# Patient Record
Sex: FEMALE | Race: BLACK OR AFRICAN AMERICAN | NOT HISPANIC OR LATINO | Employment: FULL TIME | URBAN - METROPOLITAN AREA
[De-identification: names, ages, dates, MRNs, and addresses within clinical notes are randomized per-mention and may not be internally consistent; named-entity substitution may affect disease eponyms.]

---

## 2017-01-26 ENCOUNTER — OFFICE VISIT (OUTPATIENT)
Dept: PODIATRY | Facility: CLINIC | Age: 47
End: 2017-01-26
Payer: COMMERCIAL

## 2017-01-26 VITALS — HEIGHT: 63 IN | WEIGHT: 181.69 LBS | BODY MASS INDEX: 32.19 KG/M2

## 2017-01-26 DIAGNOSIS — M72.2 PLANTAR FASCIITIS: Primary | ICD-10-CM

## 2017-01-26 DIAGNOSIS — M79.672 FOOT PAIN, LEFT: ICD-10-CM

## 2017-01-26 DIAGNOSIS — M24.573 EQUINUS CONTRACTURE OF ANKLE: ICD-10-CM

## 2017-01-26 PROCEDURE — 99999 PR PBB SHADOW E&M-EST. PATIENT-LVL III: CPT | Mod: PBBFAC,,, | Performed by: PODIATRIST

## 2017-01-26 PROCEDURE — 99213 OFFICE O/P EST LOW 20 MIN: CPT | Mod: 25,S$GLB,, | Performed by: PODIATRIST

## 2017-01-26 PROCEDURE — 29540 STRAPPING ANKLE &/FOOT: CPT | Mod: LT,S$GLB,, | Performed by: PODIATRIST

## 2017-01-26 PROCEDURE — 1159F MED LIST DOCD IN RCRD: CPT | Mod: S$GLB,,, | Performed by: PODIATRIST

## 2017-01-26 NOTE — PROGRESS NOTES
Subjective:      Patient ID: Joselin Phillips is a 46 y.o. female.    Chief Complaint: Foot Pain (left)    Sharp deep pain bottom left heel. Minimal if any improvement from inserts, stretches, athletic shoes, one injection, and fx boot.  X-rays show inferior spur without acute injury.      Review of Systems   Constitution: Negative for chills, diaphoresis, fever, malaise/fatigue and night sweats.   Cardiovascular: Negative for claudication, cyanosis, leg swelling and syncope.   Skin: Negative for color change, dry skin, nail changes, rash, suspicious lesions and unusual hair distribution.   Musculoskeletal: Negative for falls, joint pain, joint swelling, muscle cramps, muscle weakness and stiffness.   Gastrointestinal: Negative for constipation, diarrhea, nausea and vomiting.   Neurological: Negative for brief paralysis, disturbances in coordination, focal weakness, numbness, paresthesias, sensory change and tremors.           Objective:      Physical Exam   Constitutional: She appears well-developed and well-nourished. She is cooperative. No distress.   Cardiovascular:   Pulses:       Popliteal pulses are 2+ on the right side, and 2+ on the left side.        Dorsalis pedis pulses are 2+ on the right side, and 2+ on the left side.        Posterior tibial pulses are 2+ on the right side, and 2+ on the left side.   Capillary refill 3 seconds all toes/distal feet, all toes/both feet warm to touch.      Negative lymphadenopathy bilateral popliteal fossa and tarsal tunnel.      Negavie lower extremity edema bilateral.     Musculoskeletal:        Right ankle: Normal. She exhibits normal range of motion, no swelling, no ecchymosis, no deformity, no laceration and normal pulse. Achilles tendon normal. Achilles tendon exhibits no pain, no defect and normal Rodríguez's test results.   Sharp deep pain to palpation inferior heel left at medial calcaneal tubercle without ecchymosis, erythema, edema, or cardinal signs  infection, and no signs of trauma.    Ankle dorsiflexion decreased at <10 degrees bilateral with moderate increase with knee flexion bilateral.    Otherwise, Normal angle, base, station of gait. All ten toes without clubbing, cyanosis, or signs of ischemia.  No pain to palpation bilateral lower extremities.  Range of motion, stability, muscle strength, and muscle tone normal bilateral feet and legs.     Lymphadenopathy: No inguinal adenopathy noted on the right or left side.   Negative lymphadenopathy bilateral popliteal fossa and tarsal tunnel.   Neurological: She is alert. She has normal strength. She displays no atrophy and no tremor. No sensory deficit. She exhibits normal muscle tone. She displays no seizure activity. Gait normal.   Reflex Scores:       Patellar reflexes are 2+ on the right side and 2+ on the left side.       Achilles reflexes are 2+ on the right side and 2+ on the left side.  Negative tinel sign to percussion sural, superficial peroneal, deep peroneal, saphenous, and posterior tibial nerves right and left ankles and feet.     Skin: Skin is warm, dry and intact. No abrasion, no bruising, no burn, no ecchymosis, no laceration, no lesion and no rash noted. She is not diaphoretic. No cyanosis or erythema. No pallor. Nails show no clubbing.     Skin is normal age and health appropriate color, turgor, texture, and temperature bilateral lower extremities without ulceration, hyperpigmentation, discoloration, masses nodules or cords palpated.  No ecchymosis, erythema, edema, or cardinal signs of infection bilateral lower extremities.               Assessment:       Encounter Diagnoses   Name Primary?    Plantar fasciitis Yes    Foot pain, left     Equinus contracture of ankle          Plan:       Joselin was seen today for foot pain.    Diagnoses and all orders for this visit:    Plantar fasciitis  -     Ambulatory consult to Physical Therapy  -     ORTHOTIC DEVICE (DME)    Foot pain, left  -      Ambulatory consult to Physical Therapy  -     ORTHOTIC DEVICE (DME)    Equinus contracture of ankle  -     Ambulatory consult to Physical Therapy  -     ORTHOTIC DEVICE (DME)      I counseled the patient on her conditions, their implications and medical management.    I applied a plantar rest strapping to the patient's left foot to offload symptomatic area, support the arch, and relieve pain.    Continue inserts, athletic shoes, fx boot, nsaids.    Rx PT, custom orthotics.          Return in about 6 weeks (around 3/9/2017) for left pf.

## 2017-01-26 NOTE — MR AVS SNAPSHOT
"    [facility]  [address] [address]  Phone: [phone]                  Joselin Phillips   2017 9:45 AM   Office Visit    Description:  Female : 1970   Provider:  Amilcar Guan DPM   Department:  [facility]           Reason for Visit     Foot Pain           Diagnoses this Visit        Comments    Plantar fasciitis    -  Primary     Foot pain, left         Equinus contracture of ankle                To Do List           Future Appointments        Provider Department Dept Phone    3/9/2017 10:00 AM Amilcar Guan DPM [facility] [phone]      Goals (5 Years of Data)     None      Follow-Up and Disposition     Return in about 6 weeks (around 3/9/2017) for left pf.    Follow-up and Disposition History      [facility]     [facility] Care [facility] -  Assistance  Registered nurses in the [facility] provide clinical advisement, health education, appointment booking, and other advisory services.  Call for this free service at [phone].             Medications           Message regarding Medications     Verify the changes and/or additions to your medication regime listed below are the same as discussed with your clinician today.  If any of these changes or additions are incorrect, please notify your healthcare provider.             Verify that the below list of medications is an accurate representation of the medications you are currently taking.  If none reported, the list may be blank. If incorrect, please contact your healthcare provider. Carry this list with you in case of emergency.           Current Medications     meloxicam (MOBIC) 15 MG tablet Take 1 tablet (15 mg total) by mouth once daily.           Clinical Reference Information           Vital Signs - Last Recorded  Most recent update: 2017  9:49 AM by Winifred Guadarrama LPN     Wt BMI          5' 3" (1.6 m) 82.4 kg (181 lb 10.5 oz) 32.18 kg/m2      "   Allergies as of 1/26/2017     No Known Allergies      Immunizations Administered on Date of Encounter - 1/26/2017     None      Orders Placed During Today's Visit      Normal Orders This Visit    Ambulatory consult to Physical Therapy     ORTHOTIC DEVICE (DME)       [facility] Sign-Up     Activating your [facility] account is as easy as 1-2-3!     1) Visit [email or web address], select Sign Up Now, enter this activation code and your date of birth, then select Next.  [email or web address]  Expires: 3/12/2017 10:12 AM      2) Create a username and password to use when you visit [facility] in the future and select a security question in case you lose your password and select Next.    3) Enter your e-mail address and click Sign Up!    Additional Information  If you have questions, please e-mail [email or web address] or call [phone] to talk to our [facility] staff. Remember, [facility] is NOT to be used for urgent needs. For medical emergencies, dial 911.

## 2017-02-06 ENCOUNTER — CLINICAL SUPPORT (OUTPATIENT)
Dept: REHABILITATION | Facility: HOSPITAL | Age: 47
End: 2017-02-06
Attending: PODIATRIST
Payer: COMMERCIAL

## 2017-02-06 DIAGNOSIS — M72.2 PLANTAR FASCIITIS: ICD-10-CM

## 2017-02-06 PROCEDURE — 97161 PT EVAL LOW COMPLEX 20 MIN: CPT | Mod: PN

## 2017-02-06 NOTE — PLAN OF CARE
"TIME RECORD    02/06/2017    Start Time:  1018  Stop Time:  1100    PROCEDURES:    TIMED  Procedure Time Min.    Start:  Stop:     Start:  Stop:     Start:  Stop:     Start:  Stop:          UNTIMED  Procedure Time Min.   Evaluation Start:1018  Stop:1100 48    Start:  Stop:      Total Timed Minutes:  0  Total Timed Units:  0  Total Untimed Units:  1  Charges Billed/# of units:  1    OUTPATIENT PHYSICAL THERAPY   PATIENT EVALUATION  Onset Date: 2011  Problem List Items Addressed This Visit     Plantar fasciitis        Treatment Diagnosis: Gait abnormality    No past medical history on file.    No past surgical history on file.    has a current medication list which includes the following prescription(s): meloxicam.    Precautions: standard  Surgical history: see above  Prior Therapy: yes, lower back  History of Present Illness: Patient is a 45 yo who presents to physical therapy with c/o chronic left foot pain. Minimal relief with boot.   Prior Level of Function: Independent  Social History:    Living environment: apartment    Stairs to enter home: none    Railings:    Employment: / at [employer]   Transportation: drives   Leisure activities/hobbies: walking, listening to music    Current level of function: Independent  Functional Deficits Leading to Referral/Nature of Injury: decreased tolerance to activity, gait abnormality  Patient Therapy Goals: "Decrease pain, get back in my shoes."    Patient Identified Problem List:    1. Pain and decreased tolerance to standing, walking at work  2. Cannot wear heels or flats      Subjective     Joselin Phillips states pain after an hour at work. Wants to have surgery to remove the bone spur    Pain:  Location: left heel and arch  Description: throbbing, sharp  Activities Which Increase Pain: Standing and Walking  Activities Which Decrease Pain: rest  Pain Scale: 3/10 at best 7/10 now  10/10 at worst    Numbness/Paraesthesias: subjectivie " numbness/tingling at pain area when on her feet for long periods of time    Objective     Posture: In standing with decreased weight shift to LLE, wearing boot  Palpation: Tenderness at left plantar foot along plantar fascia and medial calcaneal tubercle  Sensation: intact to LT  DTRs:   Range of Motion/Strength:      Ankle Right  Left  Pain/Dysfunction with Movement    PROM MMT PROM MMT    Dorsiflexion -3* 5/5 -5* 4-/5    Planarflexion 40* 5/5 36* 4-/5    Inversion 35* 5/5 36* 4-/5    Eversion 15* 5/5 14* 4-/5      *pain with all resisted motions of left ankle        Flexibility: Tightness present in bilateral gastroc/soleus complex  Gait: antalgic  Bed Mobility: Supine<>sit Independent  Transfers: Sit<>stand Independent  Functional Outcome Measure: Lower Extremity Functional Scale (LEFS): 15/80=81% impairmed  Treatment: Educated on role/goal PT, POC.  Pt verbalizing understanding and agreement.     Assessment       Initial Assessment (Pertinent finding, problem list and factors affecting outcome): Patient is a 45 yo F presenting to PT with c/o left foot pain since 2011. Notable impairments include decreased ROM, weakness, gait abnormality, and impaired functional mobility. Patient would benefit from skilled PT to address notable impairments and improve functional mobility to PLOF.    Rehab Potiential: good    Short Term Goals (3 Weeks): 1) Pt will initiate HEP 2) Patient will improve DF to neutral left ankle for ambulatory purposes   Long Term Goals (6 Weeks): 1) Pt will be I with HEP 2) Pt will return to community ambulation with strength 4/5 or > 3) Pt will improve LEFS by 10%    Plan     Certification Period: 02/06/17 to 03/24/17  Recommended Treatment Plan: 2 times per week for 6 weeks: Gait Training, Manual Therapy, Moist Heat/ Ice, Patient Education, Therapeutic Activites, Therapeutic Exercise, Ultrasound/Phonophoresis, Whirlpool/Fluidotherapy and Other dry needling PRN  Other Recommendations:     Thank you  for referral.    Therapist: GUILLERMO Maria THE NEED FOR THESE SERVICES FURNISHED UNDER THIS PLAN OF TREATMENT AND WHILE UNDER MY CARE    Physician's comments: ________________________________________________________________________________________________________________________________________________      Physician's Name: ___________________________________

## 2017-02-09 PROBLEM — M72.2 PLANTAR FASCIITIS: Status: ACTIVE | Noted: 2017-02-09

## 2017-02-14 ENCOUNTER — TELEPHONE (OUTPATIENT)
Dept: REHABILITATION | Facility: HOSPITAL | Age: 47
End: 2017-02-14

## 2017-02-16 ENCOUNTER — TELEPHONE (OUTPATIENT)
Dept: REHABILITATION | Facility: HOSPITAL | Age: 47
End: 2017-02-16

## 2017-02-21 ENCOUNTER — CLINICAL SUPPORT (OUTPATIENT)
Dept: REHABILITATION | Facility: HOSPITAL | Age: 47
End: 2017-02-21
Attending: PODIATRIST
Payer: COMMERCIAL

## 2017-02-21 DIAGNOSIS — M72.2 PLANTAR FASCIITIS: ICD-10-CM

## 2017-02-21 PROCEDURE — 97110 THERAPEUTIC EXERCISES: CPT | Mod: PN

## 2017-02-21 PROCEDURE — 97140 MANUAL THERAPY 1/> REGIONS: CPT | Mod: PN

## 2017-02-21 NOTE — PROGRESS NOTES
"Name: Joselin Phillips  Clinic Number: [id number]  Date of Treatment: 02/21/2017   Diagnosis:   Encounter Diagnosis   Name Primary?    Plantar fasciitis        Time in: 1105  Time Out: 1210  Total Treatment Time: 65    Subjective:    Joselin reports plantar L heel pain. Wearing L walking boot, reporting that she wears it "almost all the time" 2* discomfort that ensues shortly after wearing even new athletic shoes with inserts.  Patient reports their pain to be 6/10 on a 0-10 scale with 0 being no pain and 10 being the worst pain imaginable.    Objective    Patient received individual therapy to increase strength, endurance, ROM and flexibility with activities as follows:     Joselin received therapeutic exercises to develop strength, endurance, ROM and flexibility for 50 minutes including:     Nutep L2 10' LE's and UE's  Standing gastroc stretches B 3/30s in // bars  Standing soleus stretches 3/30s B in // bars  Standing plantar fascia stretches L on wall 3/30s  Seated HR/TR L 10/3  Seated arch curls L 10/3  Seated towel scrunches L 10/3  Seated ankle 4 way L 10/3 with YTB    Joselin received the following manual therapy techniques: Myofacial release and Soft tissue Mobilization were applied to the: L plantar fascia and foot for 10 minutes.     Educated pt of DOMS effect. Written and pictorial HEP of ankle 4 way, ankle circles, towel scrunches, and stretches: gastrocs and soleus reviewed and issued to pt. Pt instructed to perform HEP daily and stop if symptoms increase. Instructed pt to bring tennis shoe for wearing during PT sessions.    Pt demo good understanding of the education provided. Joselin demonstrated good return demonstration of activities.     Assessment:     Good tolerance for first session since eval with mm fatigue but no increase in symptoms reported. Tenderness plantar L foot which improves with manual. Limited L gastroc flexibility.     Pt will continue to benefit from skilled PT " intervention. Medical Necessity is demonstrated by:  Requires skilled supervision to complete and progress HEP and Weakness.    Patient is making good progress towards established goals.    Plan:    Continue with established Plan of Care towards PT goals.

## 2017-02-21 NOTE — PATIENT INSTRUCTIONS
Plantar Fasciitis  Plantar fasciitis is a painful swelling of the plantar fascia. The plantar fascia is a thick, fibrous layer of tissue. It covers the bones on the bottom of your foot. And it supports the foot bones in an arched position.  This can happen gradually or suddenly. It usually affects one foot at a time. Heel pain can be sharp, like a knife sticking into the bottom of your foot. You may feel pain after exercising, long-distance jogging, stair climbing, long periods of standing, or after standing up.  Risk factors include: non-active lifestyle, arthritis, diabetes, obesity or recent weight gain, flat foot, high arch. Wearing high heels, loose shoes, or shoes with poor arch support for long periods of time adds to the risk. This problem is commonly found in runners and dancers. It also found in people who stand on hard surfaces for long periods of time.  Foot pain from this condition is usually worse in the morning. But it improves with walking. By the end of the day there may be a dull aching. Treatment requires short-term rest and controlling swelling. It may take up to 9 months before all symptoms go away. Rarely, a steroid injection into the foot, or surgery, may be needed.  Home care  · If you are overweight, lose weight to help healing.  · Choose supportive shoes with good arch support and shock absorbency. Replace athletic shoes when they become worn out. Dont walk or run barefoot.  · Premade or custom-fitted shoe inserts may be helpful. Inserts made of silicone seem to be the most effective. Custom-made inserts can be provided by a podiatrist or foot specialist, physical therapist, or orthopedist.  · Premade or custom-made night splints keep the heel stretched out while you sleep. They may prevent morning pain.  · Avoid activities that stress the feet: jogging, prolonged standing or walking, contact sports, etc.  · First thing in the morning and before sports, stretch the bottom of your feet.  Gently flex your ankle so the toes move toward your knee.  · Icing may help control heel pain. Apply an ice pack to the heel for 10-20 minutes as a preventive. Or ice your heel after a severe flare-up of symptoms. You may repeat this every 1-2 hours as needed.  · You may use over-the-counter pain medicine to control pain, unless another medicine was prescribed. Anti-inflammatory pain medicines, such as ibuprofen or naproxen, may work better than acetaminophen. If you have chronic liver or kidney disease or ever had a stomach ulcer or GI bleeding, talk with your healthcare provider before using these medicines.  Follow-up care  Follow up with your healthcare provider, physical therapist, or podiatrist or foot specialist as advised.  Call for an appointment if pain worsens or there is no relief after a few weeks of home treatment. Shoe inserts, a night splint, or a special boot may be required.  If X-rays were taken, you will be told of any new findings that may affect your care.  When to seek medical advice  Call your healthcare provider right away if any of these occur:  · Foot swelling  · Redness with increasing pain  Date Last Reviewed: 11/21/2015  © 1574-3966 [employer]. [address] [place] [address]. All rights reserved. This information is not intended as a substitute for professional medical care. Always follow your healthcare professional's instructions.        Understanding Plantar Fasciitis    Plantar fasciitis is a condition that causes foot and heel pain. The plantar fascia is a tough band of tissue that runs across the bottom of the foot from the heel to the toes. This tissue pulls on the heel bone. It supports the arch of the foot as it pushes off the ground. If the tissue becomes irritated or red and swollen (inflamed), it is called plantar fasciitis.  How to say it  PLAN-tuhr fa-see-IY-[id number]   What causes plantar fasciitis?  Plantar fasciitis most often occurs from overusing  the plantar fascia. The tissue may become damaged from activities that put repeated stress on the heel and foot. Or it may wear down over time with age and ankle stiffness. You are more likely to have plantar fasciitis if you:  · Do activities that require a lot of running, jumping, or dancing  · Have a job that requires being on your feet for long periods  · Are overweight or obese  · Have certain foot problems, such as a tight Achilles tendon, flat feet, or high arches  · Often wear poorly fitting shoes  Symptoms of plantar fasciitis  The condition most often causes pain in the heel and the bottom of the foot. The pain may occur when you take your first steps in the morning. It may get better as you walk throughout the day. But as you continue to put weight on the foot, the pain often returns. Pain may also occur after standing or sitting for long periods.  Treating plantar fasciitis  Treatments for plantar fasciitis include:  · Resting the foot. This involves limiting movements that make your foot hurt. You may also need to avoid certain sports and types of work for a time.  · Using cold packs. Put an ice pack on the heel and foot to help reduce pain and swelling.  · Taking pain medicines. Prescription and over-the-counter pain medicines can help relieve pain and swelling.  · Using heel cups or foot inserts (orthotics). These are placed in the shoes to help support the heel or arch and cushion the heel. You may also be told to buy proper-fitting shoes with good arch support and cushioned soles.  · Taping the foot. This supports the arch and limits the movement of the plantar fascia to help relieve symptoms.  · Wearing a night splint. This stretches the plantar fascia and leg muscles while you sleep. This may help relieve pain.  · Doing exercises and physical therapy. These stretch and strengthen the plantar fascia and the muscles in the leg that support the heel and foot.  · Getting shots of medicine into the  foot. These may help relieve symptoms for a time.  · Having surgery. This may be needed if other treatments fail to relieve symptoms. During surgery, the surgeon may partially cut the plantar fascia to release tension.  Possible complications of plantar fasciitis  Without proper care and treatment, healing may take longer than normal. Also, symptoms may continue or get worse. Over time, the plantar fascia may be damaged. This can make it hard to walk or even stand without pain.  When to call your healthcare provider  Call your healthcare provider right away if you have any of these:  · Fever of 100.4°F (38°C) or higher, or as directed  · Symptoms that dont get better with treatment, or get worse  · New symptoms, such as numbness, tingling, or weakness in the foot   Date Last Reviewed: 3/10/2016  © 7293-4930 [employer]. [address] [place] [address]. All rights reserved. This information is not intended as a substitute for professional medical care. Always follow your healthcare professional's instructions.      Ankle Circles        Slowly rotate right foot and ankle clockwise then counterclockwise. Gradually increase range of motion. Avoid pain.  [place] ____ times each direction per set. Do ____ sets per session. Do ____ sessions per day.     [email or web address]     Copyright © [employer]. All rights reserved.   Dorsiflexion: Resisted        Facing anchor, tubing around left foot, pull toward face.   Repeat ____ times per set. Do ____ sets per session. Do ____ sessions per day.     [email or web address]     Copyright © [employer]. All rights reserved.   Eversion: Resisted        With right foot in tubing loop, hold tubing around other foot to resist and turn foot out.  Repeat ____ times per set. Do ____ sets per session. Do ____ sessions per day.     [email or web address]     Copyright © [employer]. All rights reserved.   Inversion: Resisted        Cross legs with right leg underneath, foot in tubing  loop. Hold tubing around other foot to resist and turn foot in.  Repeat ____ times per set. Do ____ sets per session. Do ____ sessions per day.     [email or web address]     Copyright © [employer]. All rights reserved.   Plantar Flexion: Resisted        [place] behind, tubing around left foot, press down.  Repeat ____ times per set. Do ____ sets per session. Do ____ sessions per day.     [email or web address]     Copyright © [employer]. All rights reserved.   Gastroc Stretch        Stand with right foot back, leg straight, forward leg bent. Keeping heel on floor, turned slightly out, lean into wall until stretch is felt in calf. Hold ____ seconds.  Repeat ____ times per set. Do ____ sets per session. Do ____ sessions per day.     [email or web address]     Copyright © [employer]. All rights reserved.   Soleus Stretch        Stand with right foot back, both knees bent. Keeping heel on floor, turned slightly out, lean into wall until stretch is felt in lower calf. Hold ____ seconds.  Repeat ____ times per set. Do ____ sets per session. Do ____ sessions per day.     [email or web address]     Copyright © [employer]. All rights reserved.

## 2017-02-21 NOTE — MR AVS SNAPSHOT
[facility] [address]  [place] [address]  Phone: [phone]  Fax: [phone]                  Joselin Phillips   2017 11:00 AM   Clinical Support    Description:  Female : 1970   Provider:  Jeimy Sawant PTA   Department:  [facility]                To Do List           Future Appointments        Provider Department Dept Phone    2017 11:00 AM Mary Thomas, PT [facility] [phone]    2017 9:00 AM Jeimy Sawant PTA [facility] [phone]    3/2/2017 11:00 AM Jeimy Sawant PTA [facility] [phone]    3/7/2017 11:00 AM Jeimy Sawant PTA [facility] [phone]    3/9/2017 10:00 AM Amilcar Guan DPM [place] - Podiatry [phone]      Goals (5 Years of Data)     None      [facility]     [facility] Line -  Assistance  Registered nurses in the [facility] provide clinical advisement, health education, appointment booking, and other advisory services.  Call for this free service at [phone].             Medications           Message regarding Medications     Verify the changes and/or additions to your medication regime listed below are the same as discussed with your clinician today.  If any of these changes or additions are incorrect, please notify your healthcare provider.             Verify that the below list of medications is an accurate representation of the medications you are currently taking.  If none reported, the list may be blank. If incorrect, please contact your healthcare provider. Carry this list with you in case of emergency.           Current Medications     meloxicam (MOBIC) 15 MG tablet Take 1 tablet (15 mg total) by mouth once daily.           Clinical Reference Information           Allergies as of 2017     No Known Allergies      Immunizations Administered on  Date of Encounter - 2/21/2017     None      [facility] Sign-Up     Activating your [facility] account is as easy as 1-2-3!     1) Visit [email or web address], select Sign Up Now, enter this activation code and your date of birth, then select Next.  [email or web address]  Expires: 3/12/2017 10:12 AM      2) Create a username and password to use when you visit [facility] in the future and select a security question in case you lose your password and select Next.    3) Enter your e-mail address and click Sign Up!    Additional Information  If you have questions, please e-mail [email or web address] or call [phone] to talk to our [facility] staff. Remember, [facility] is NOT to be used for urgent needs. For medical emergencies, dial 911.         Instructions      Plantar Fasciitis  Plantar fasciitis is a painful swelling of the plantar fascia. The plantar fascia is a thick, fibrous layer of tissue. It covers the bones on the bottom of your foot. And it supports the foot bones in an arched position.  This can happen gradually or suddenly. It usually affects one foot at a time. Heel pain can be sharp, like a knife sticking into the bottom of your foot. You may feel pain after exercising, long-distance jogging, stair climbing, long periods of standing, or after standing up.  Risk factors include: non-active lifestyle, arthritis, diabetes, obesity or recent weight gain, flat foot, high arch. Wearing high heels, loose shoes, or shoes with poor arch support for long periods of time adds to the risk. This problem is commonly found in runners and dancers. It also found in people who stand on hard surfaces for long periods of time.  Foot pain from this condition is usually worse in the morning. But it improves with walking. By the end of the day there may be a dull aching. Treatment requires short-term rest and controlling swelling. It may take up to 9 months before all symptoms go away. Rarely, a steroid injection into the foot, or  surgery, may be needed.  Home care  · If you are overweight, lose weight to help healing.  · Choose supportive shoes with good arch support and shock absorbency. Replace athletic shoes when they become worn out. Dont walk or run barefoot.  · Premade or custom-fitted shoe inserts may be helpful. Inserts made of silicone seem to be the most effective. Custom-made inserts can be provided by a podiatrist or foot specialist, physical therapist, or orthopedist.  · Premade or custom-made night splints keep the heel stretched out while you sleep. They may prevent morning pain.  · Avoid activities that stress the feet: jogging, prolonged standing or walking, contact sports, etc.  · First thing in the morning and before sports, stretch the bottom of your feet. Gently flex your ankle so the toes move toward your knee.  · Icing may help control heel pain. Apply an ice pack to the heel for 10-20 minutes as a preventive. Or ice your heel after a severe flare-up of symptoms. You may repeat this every 1-2 hours as needed.  · You may use over-the-counter pain medicine to control pain, unless another medicine was prescribed. Anti-inflammatory pain medicines, such as ibuprofen or naproxen, may work better than acetaminophen. If you have chronic liver or kidney disease or ever had a stomach ulcer or GI bleeding, talk with your healthcare provider before using these medicines.  Follow-up care  Follow up with your healthcare provider, physical therapist, or podiatrist or foot specialist as advised.  Call for an appointment if pain worsens or there is no relief after a few weeks of home treatment. Shoe inserts, a night splint, or a special boot may be required.  If X-rays were taken, you will be told of any new findings that may affect your care.  When to seek medical advice  Call your healthcare provider right away if any of these occur:  · Foot swelling  · Redness with increasing pain  Date Last Reviewed: 11/21/2015  © 8965-0234 [employer]  [employer]. [address] [place] [address], [place] [address]. All rights reserved. This information is not intended as a substitute for professional medical care. Always follow your healthcare professional's instructions.        Understanding Plantar Fasciitis    Plantar fasciitis is a condition that causes foot and heel pain. The plantar fascia is a tough band of tissue that runs across the bottom of the foot from the heel to the toes. This tissue pulls on the heel bone. It supports the arch of the foot as it pushes off the ground. If the tissue becomes irritated or red and swollen (inflamed), it is called plantar fasciitis.  How to say it  PLAN-tuhr fa-see-IY-tis   What causes plantar fasciitis?  Plantar fasciitis most often occurs from overusing the plantar fascia. The tissue may become damaged from activities that put repeated stress on the heel and foot. Or it may wear down over time with age and ankle stiffness. You are more likely to have plantar fasciitis if you:  · Do activities that require a lot of running, jumping, or dancing  · Have a job that requires being on your feet for long periods  · Are overweight or obese  · Have certain foot problems, such as a tight Achilles tendon, flat feet, or high arches  · Often wear poorly fitting shoes  Symptoms of plantar fasciitis  The condition most often causes pain in the heel and the bottom of the foot. The pain may occur when you take your first steps in the morning. It may get better as you walk throughout the day. But as you continue to put weight on the foot, the pain often returns. Pain may also occur after standing or sitting for long periods.  Treating plantar fasciitis  Treatments for plantar fasciitis include:  · Resting the foot. This involves limiting movements that make your foot hurt. You may also need to avoid certain sports and types of work for a time.  · Using cold packs. Put an ice pack on the heel and foot to help reduce pain and  swelling.  · Taking pain medicines. Prescription and over-the-counter pain medicines can help relieve pain and swelling.  · Using heel cups or foot inserts (orthotics). These are placed in the shoes to help support the heel or arch and cushion the heel. You may also be told to buy proper-fitting shoes with good arch support and cushioned soles.  · Taping the foot. This supports the arch and limits the movement of the plantar fascia to help relieve symptoms.  · Wearing a night splint. This stretches the plantar fascia and leg muscles while you sleep. This may help relieve pain.  · Doing exercises and physical therapy. These stretch and strengthen the plantar fascia and the muscles in the leg that support the heel and foot.  · Getting shots of medicine into the foot. These may help relieve symptoms for a time.  · Having surgery. This may be needed if other treatments fail to relieve symptoms. During surgery, the surgeon may partially cut the plantar fascia to release tension.  Possible complications of plantar fasciitis  Without proper care and treatment, healing may take longer than normal. Also, symptoms may continue or get worse. Over time, the plantar fascia may be damaged. This can make it hard to walk or even stand without pain.  When to call your healthcare provider  Call your healthcare provider right away if you have any of these:  · Fever of 100.4°F (38°C) or higher, or as directed  · Symptoms that dont get better with treatment, or get worse  · New symptoms, such as numbness, tingling, or weakness in the foot   Date Last Reviewed: 3/10/2016  © 6198-7138 [employer]. [address] [place] [address], [place] [address]. All rights reserved. This information is not intended as a substitute for professional medical care. Always follow your healthcare professional's instructions.      Ankle Circles        Slowly rotate right foot and ankle clockwise then counterclockwise. Gradually increase range of motion.  Avoid pain.  [place] ____ times each direction per set. Do ____ sets per session. Do ____ sessions per day.     [email or web address]://Lumus.[email or web address]/30     Copyright © [employer]. All rights reserved.   Dorsiflexion: Resisted        Facing anchor, tubing around left foot, pull toward face.   Repeat ____ times per set. Do ____ sets per session. Do ____ sessions per day.     [email or web address]://Lumus.[email or web address]/8     Copyright © [employer]. All rights reserved.   Eversion: Resisted        With right foot in tubing loop, hold tubing around other foot to resist and turn foot out.  Repeat ____ times per set. Do ____ sets per session. Do ____ sessions per day.     https://W[employer]/14     Copyright © [employer]. All rights reserved.   Inversion: Resisted        Cross legs with right leg underneath, foot in tubing loop. Hold tubing around other foot to resist and turn foot in.  Repeat ____ times per set. Do ____ sets per session. Do ____ sessions per day.     https://[employer]/12     Copyright © [employer]. All rights reserved.   Plantar Flexion: Resisted        [place]well behind, tubing around left foot, press down.  Repeat ____ times per set. Do ____ sets per session. Do ____ sessions per day.     https://[employer]/10     Copyright © [employer]. All rights reserved.   Gastroc Stretch        Stand with right foot back, leg straight, forward leg bent. Keeping heel on floor, turned slightly out, lean into wall until stretch is felt in calf. Hold ____ seconds.  Repeat ____ times per set. Do ____ sets per session. Do ____ sessions per day.     https://Lumus.[employer]/26     Copyright © [employer]. All rights reserved.   Soleus Stretch        Stand with right foot back, both knees bent. Keeping heel on floor, turned slightly out, lean into wall until stretch is felt in lower calf. Hold ____ seconds.  Repeat ____ times per set. Do ____ sets per session. Do ____ sessions per day.     https://[employer]/24     Copyright © [employer]. All rights reserved.          Language Assistance Services     ATTENTION:  Language assistance services are available, free of charge. Please call [phone].      ATENCIÓN: Si habla margaretañol, tiene a sanchez disposición servicios gratuitos de asistencia lingüística. Llame al [phone].     CHÚ Ý: N?u b?n nói Ti?ng Vi?t, có các d?ch v? h? tr? ngôn ng? mi?n phí dành cho b?n. G?i s? [phone].         [facility] complies with applicable Federal civil rights laws and does not discriminate on the basis of race, color, national origin, age, disability, or sex.

## 2017-02-24 ENCOUNTER — CLINICAL SUPPORT (OUTPATIENT)
Dept: REHABILITATION | Facility: HOSPITAL | Age: 47
End: 2017-02-24
Attending: PODIATRIST
Payer: COMMERCIAL

## 2017-02-24 DIAGNOSIS — M72.2 PLANTAR FASCIITIS: ICD-10-CM

## 2017-02-24 PROCEDURE — 97110 THERAPEUTIC EXERCISES: CPT | Mod: PN

## 2017-02-24 PROCEDURE — 97140 MANUAL THERAPY 1/> REGIONS: CPT | Mod: PN

## 2017-02-24 NOTE — PROGRESS NOTES
"Name: Joselin Phillips  [facility] Number: [id number]  Date of Treatment: 02/24/2017   Diagnosis:   Encounter Diagnosis   Name Primary?    Plantar fasciitis        Time in: 0904  Time Out: 1000  Total Treatment Time: 56  Group Time: 0      Subjective:    Joselin reports improvement of symptoms.  Patient reports their pain to be 4-5/10 on a 0-10 scale with 0 being no pain and 10 being the worst pain imaginable.    Objective    Joselin received therapeutic exercises to develop strength, endurance and flexibility for 46 minutes including:     Nutep Lv2 10' with UE's  Hamstring stretch 3x30"  Gastroc stretch 3x30" 1/2 roll  Soleus stretch 3x30" 1/2 roll  Plantar fascia stretch seated with towel 3x30"  HR/TR Airex 3x10  Ankle 4-way Red Tband x30 L      Joselin received the following manual therapy techniques: Myofacial release and Soft tissue Mobilization were applied to the: L plantar fascia with toe flexion, and lower calf 10'      Pt demo good understanding of the education provided. Joselin demonstrated good return demonstration of activities.     Assessment:     Pt will continue to benefit from skilled PT intervention. Medical Necessity is demonstrated by:  Pain limits function of effected part for some activities, Unable to participate fully in daily activities, Requires skilled supervision to complete and progress HEP and Weakness.    Patient is making fair progress towards established goals.    Plan:    Continue with established Plan of Care towards PT goals.   "

## 2017-02-27 ENCOUNTER — CLINICAL SUPPORT (OUTPATIENT)
Dept: REHABILITATION | Facility: HOSPITAL | Age: 47
End: 2017-02-27
Attending: PODIATRIST
Payer: COMMERCIAL

## 2017-02-27 DIAGNOSIS — M72.2 PLANTAR FASCIITIS: ICD-10-CM

## 2017-02-27 PROCEDURE — 97110 THERAPEUTIC EXERCISES: CPT | Mod: PN

## 2017-02-27 NOTE — PROGRESS NOTES
"Name: Joselin Phillips  Date:   02/27/2017  Primary Diagnosis: .  Problem List Items Addressed This Visit     Plantar fasciitis          Time in: 0906  Time Out: 0955  Total Treatment Time: 49  Group Time: 0      Subjective:    Patient reports increased pain from working last night. She took a 5 min break to sit down, then had a lot of pain with standing back up and walking afterward.  Patient reports their pain to be 8/10 in the left foot on a 0-10 scale with 0 being no pain and 10 being the worst pain imaginable.    Objective    Patient received individual therapy to address strength, ROM and flexibility with 0 other patients with activities as follows:     Patient received therapeutic exercises to develop strength, endurance, ROM and flexibility for 49 minutes including:     Seated HR/TR 3x10  Ankle 4-way c/ RTB 3x10  Seated hamstring stretch 3/30" B  Standing gastroc stretch 3/30" B  Standing soleus stretch 3/30" B  Seated PF c/ towel 3/30"  MT: STM/MF to plantar foot x 7 min    Assessment:     Patient tolerating treatment fairly. Declining cold pack. Strongly recommended pt use a cold pack at home before her work shift today. Patient verbalizing understanding/agreement and all questions answered.    Pt will continue to benefit from skilled PT intervention. Medical Necessity is demonstrated by:  Pain limits function of effected part for some activities, Unable to participate fully in daily activities and Weakness.    Patient is making fair progress towards established goals.    Plan:  Continue with established Plan of Care towards PT goals.     "

## 2017-02-28 ENCOUNTER — HOSPITAL ENCOUNTER (EMERGENCY)
Facility: HOSPITAL | Age: 47
Discharge: HOME | End: 2017-02-28
Attending: EMERGENCY MEDICINE
Payer: COMMERCIAL

## 2017-02-28 VITALS
TEMPERATURE: 99 F | DIASTOLIC BLOOD PRESSURE: 68 MMHG | WEIGHT: 185 LBS | RESPIRATION RATE: 12 BRPM | HEART RATE: 97 BPM | HEIGHT: 63 IN | BODY MASS INDEX: 32.78 KG/M2 | SYSTOLIC BLOOD PRESSURE: 110 MMHG | OXYGEN SATURATION: 99 %

## 2017-02-28 DIAGNOSIS — M77.32 CALCANEAL SPUR, LEFT: Primary | ICD-10-CM

## 2017-02-28 PROCEDURE — 99283 EMERGENCY DEPT VISIT LOW MDM: CPT

## 2017-02-28 RX ORDER — CYCLOBENZAPRINE HCL 10 MG
10 TABLET ORAL 3 TIMES DAILY PRN
Qty: 15 TABLET | Refills: 0 | Status: SHIPPED | OUTPATIENT
Start: 2017-02-28 | End: 2017-03-05

## 2017-02-28 NOTE — DISCHARGE INSTRUCTIONS
Heel Spurs    The plantar fascia is a thick, fibrous layer of tissue that covers the bones on the bottom of your foot. It holds the foot bones in an arched position. Plantar fasciitis is a painful swelling of the plantar fascia.  A heel spur is an overgrowth of bone where the plantar fascia attaches to the heel bone. The heel spur itself usually doesnt cause pain. However, the heel spur might be a sign of plantar fasciitis which may cause your foot pain. There is no specific treatment for heel spurs.   Plantar fasciitis can develop slowly or suddenly. It usually affects one foot at a time. Heel pain can feel sharp, like a knife sticking into the bottom of your foot. You may feel pain after exercising, long-distance jogging, stair climbing, long periods of standing, or after standing up.  Risk factors for plantar fasciitis include: arthritis, diabetes, obesity or recent weight gain, flat foot, and having high arches. Wearing high heels, loose shoes, or shoes with poor arch support adds to the risk.    Foot pain is usually worse in the morning. But it improves with walking. By the end of the day there may be a dull aching. Treatment includes short-term rest and controlling inflammation. It may take up to 9 months before all symptoms go away. In rare cases, a steroid injection in the foot, or surgery, may be needed.  Home care  · If you are overweight, lose weight to help healing.  · Choose supportive shoes with good arch support and shock absorbency. Replace athletic shoes when they become worn out. Dont walk or run barefoot.  · Premade or custom-fitted shoe inserts may be helpful. Inserts made of silicone seem to be the most effective. Custom-made inserts can be provided by a podiatrist or foot specialist, physical therapist, or orthopedist.  · Premade or custom-made night splints keep the heel stretched out while you sleep. They may prevent morning pain.  · Avoid activities that stress the feet: jogging,  prolonged standing or walking, contact sports, etc.  · First thing in the morning and before sports, stretch the bottom of your foot. Gently flex your ankle so the toes move toward your knee.  · Icing may help control heel pain. Apply an ice pack to the heel for 10-20 minutes as a preventive. Or ice your heel after a severe flare-up of symptoms. You may repeat this every 1-2 hours as needed.  · You may use over-the-counter pain medicine to control pain, unless another medicine was prescribed. Anti-inflammatory pain medicines, such as ibuprofen or naproxen, may work better than acetaminophen. If you have chronic liver or kidney disease or ever had a stomach ulcer or GI bleeding, talk with your healthcare provider before using these medicines.  · Shoe inserts, a night splint, or a special boot may be needed. Use these as directed by your healthcare provider.  Follow-up care   Follow up with your healthcare provider, physical therapist, or podiatrist or foot specialist as advised.  When to seek medical advice  Call your healthcare provider right away if any of these occur:  · The pain worsens.  · There is no relief after a few weeks of home treatment.   Date Last Reviewed: 11/23/2015  © 3686-8991 [employer]. [address] [place] [address], [place] [address]. All rights reserved. This information is not intended as a substitute for professional medical care. Always follow your healthcare professional's instructions.

## 2017-02-28 NOTE — ED PROVIDER NOTES
Encounter Date: 2/28/2017       History     Chief Complaint   Patient presents with    Foot Pain     Pain L heel spur since last pm.     Review of patient's allergies indicates:  No Known Allergies  HPI Comments: This patient is a 46-year-old female with a chronic history of left foot pain.  She reports being diagnosed with a left heel spur within the past year and also possible assoc plantar fasciitis.  She said she has followed up with a local podiatrist, Dr. Guan, and she is currently going to rehabilitation.  She reports she is continuing to wear her hard boot.  She reports she has received injections in the past which only transiently improved her symptoms.  She reports new shoes and insoles are not significantly affecting this either.  She does endorse continuing to stand for long periods of time at her place of work which she understands is exacerbating her pain problem. When questioned about surgery, she reports this is a last resort but she is starting to think that it may be a better option for her.  At this time she denies associated new injury, overlying skin color changes, change in strength or sensation in the foot.  She reports it is pain that is associated with a pulling in the back of the ankle.  She reports pain is refractory to Motrin but she did not take this morning.  He denies she is diabetic.    The history is provided by the patient.     History reviewed. No pertinent past medical history.  History reviewed. No pertinent surgical history.  History reviewed. No pertinent family history.  Social History   Substance Use Topics    Smoking status: Never Smoker    Smokeless tobacco: None    Alcohol use No     Review of Systems   Constitutional: Negative for fever.   Musculoskeletal: Positive for gait problem. Negative for joint swelling.   Neurological: Negative for weakness.       Physical Exam   Initial Vitals   BP Pulse Resp Temp SpO2   02/28/17 0722 02/28/17 0722 02/28/17 0722 02/28/17  0722 02/28/17 0722   110/68 97 12 98.6 °F (37 °C) 99 %     Physical Exam    Constitutional: She appears well-developed and well-nourished. No distress.   HENT:   Head: Normocephalic and atraumatic.   Eyes: EOM are normal.   Neck: Normal range of motion.   Pulmonary/Chest: No respiratory distress.   Musculoskeletal: Normal range of motion. She exhibits tenderness. She exhibits no edema.   Tenderness with palpation of the left heel, no overlying skin changes, soft compartment, no ankle pain, no erythema or rash, intact sensation   Neurological: She is alert.   Skin: Skin is warm and dry. No rash noted. No erythema.   Psychiatric: She has a normal mood and affect. Thought content normal.         ED Course   Procedures  Labs Reviewed - No data to display          Medical Decision Making:   Initial Assessment:   Patient interviewed and examined and found to be in no acute distress.  She reports no new injury or anything else that would warrant new imaging investigation.  She is having an exacerbation of familiar chronic pain.  She'll be added a muscle relaxant and asked to continue with her restriction instructions.  She is counseled and understands that continuing to stand for long periods of time exacerbates her complaint.  She is asked to follow-up with her podiatrist as soon as possible and question whether surgery is a better option for her.  She is additionally asked to return to the emergency department for any new, concerning, worsening symptoms.  ED Management:  Patient agreeable with this plan for follow-up she was discharged in stable condition.  Otherwise asked to return for any new, concerning for worsening symptoms.                   ED Course     Clinical Impression:   The encounter diagnosis was Calcaneal spur, left.    Disposition:   Disposition: Discharged  Condition: Stable       Michael Canela MD  02/28/17 0750

## 2017-02-28 NOTE — ED AVS SNAPSHOT
[facility] [address]  [place] [address]               Joselin Phillips   2017  7:25 AM   ED    Description:  Female : 1970   Department:  [facility]           Your Care was Coordinated By:     Provider Role From To    Michael Canela MD Attending Provider 17 0730 --      Reason for Visit     Foot Pain           Diagnoses this Visit        Comments    Calcaneal spur, left    -  Primary       ED Disposition     ED Disposition Condition Comment    Discharge             To Do List           Follow-up Information     Follow up with Amilcar Guan DPM. Schedule an appointment as soon as possible for a visit in 1 week(s).    Specialties:  Podiatry, Wound Care    Contact information:    [address] [facility] [address]  [phone]         These Medications        Disp Refills Start End    cyclobenzaprine (FLEXERIL) 10 MG tablet 15 tablet 0 2017 3/5/2017    Take 1 tablet (10 mg total) by mouth 3 (three) times daily as needed for Muscle spasms. - Oral    Pharmacy: [facility] [address] [place] [address] [address] [facility] Ph #: [phone]         [facility]     [facility] Care [facility] -  Assistance  Registered nurses in the [facility] provide clinical advisement, health education, appointment booking, and other advisory services.  Call for this free service at [phone].             Medications           Message regarding Medications     Verify the changes and/or additions to your medication regime listed below are the same as discussed with your clinician today.  If any of these changes or additions are incorrect, please notify your healthcare provider.        START taking these NEW medications        Refills    cyclobenzaprine (FLEXERIL) 10 MG tablet 0    Sig: Take 1 tablet (10 mg total) by mouth 3 (three) times daily as needed for Muscle spasms.     Class: Print    Route: Oral           Verify that the below list of medications is an accurate representation of the medications you are currently taking.  If none reported, the list may be blank. If incorrect, please contact your healthcare provider. Carry this list with you in case of emergency.           Current Medications     cyclobenzaprine (FLEXERIL) 10 MG tablet Take 1 tablet (10 mg total) by mouth 3 (three) times daily as needed for Muscle spasms.    meloxicam (MOBIC) 15 MG tablet Take 1 tablet (15 mg total) by mouth once daily.           Clinical Reference Information           Your Vitals Were     BP                   110/68 (BP Location: Right arm, Patient Position: Sitting)           Allergies as of 2/28/2017     No Known Allergies      Immunizations Administered on Date of Encounter - 2/28/2017     None      ED Micro, Lab, POCT     None      ED Imaging Orders     None        Discharge Instructions           Heel Spurs    The plantar fascia is a thick, fibrous layer of tissue that covers the bones on the bottom of your foot. It holds the foot bones in an arched position. Plantar fasciitis is a painful swelling of the plantar fascia.  A heel spur is an overgrowth of bone where the plantar fascia attaches to the heel bone. The heel spur itself usually doesnt cause pain. However, the heel spur might be a sign of plantar fasciitis which may cause your foot pain. There is no specific treatment for heel spurs.   Plantar fasciitis can develop slowly or suddenly. It usually affects one foot at a time. Heel pain can feel sharp, like a knife sticking into the bottom of your foot. You may feel pain after exercising, long-distance jogging, stair climbing, long periods of standing, or after standing up.  Risk factors for plantar fasciitis include: arthritis, diabetes, obesity or recent weight gain, flat foot, and having high arches. Wearing high heels, loose shoes, or shoes with poor arch support adds to the  risk.    Foot pain is usually worse in the morning. But it improves with walking. By the end of the day there may be a dull aching. Treatment includes short-term rest and controlling inflammation. It may take up to 9 months before all symptoms go away. In rare cases, a steroid injection in the foot, or surgery, may be needed.  Home care  · If you are overweight, lose weight to help healing.  · Choose supportive shoes with good arch support and shock absorbency. Replace athletic shoes when they become worn out. Dont walk or run barefoot.  · Premade or custom-fitted shoe inserts may be helpful. Inserts made of silicone seem to be the most effective. Custom-made inserts can be provided by a podiatrist or foot specialist, physical therapist, or orthopedist.  · Premade or custom-made night splints keep the heel stretched out while you sleep. They may prevent morning pain.  · Avoid activities that stress the feet: jogging, prolonged standing or walking, contact sports, etc.  · First thing in the morning and before sports, stretch the bottom of your foot. Gently flex your ankle so the toes move toward your knee.  · Icing may help control heel pain. Apply an ice pack to the heel for 10-20 minutes as a preventive. Or ice your heel after a severe flare-up of symptoms. You may repeat this every 1-2 hours as needed.  · You may use over-the-counter pain medicine to control pain, unless another medicine was prescribed. Anti-inflammatory pain medicines, such as ibuprofen or naproxen, may work better than acetaminophen. If you have chronic liver or kidney disease or ever had a stomach ulcer or GI bleeding, talk with your healthcare provider before using these medicines.  · Shoe inserts, a night splint, or a special boot may be needed. Use these as directed by your healthcare provider.  Follow-up care   Follow up with your healthcare provider, physical therapist, or podiatrist or foot specialist as advised.  When to seek medical  advice  Call your healthcare provider right away if any of these occur:  · The pain worsens.  · There is no relief after a few weeks of home treatment.   Date Last Reviewed: 11/23/2015  © 5366-1296 [employer]. [address] [place] [address], [place] [address]. All rights reserved. This information is not intended as a substitute for professional medical care. Always follow your healthcare professional's instructions.          Your Scheduled Appointments     Mar 02, 2017 11:00 AM CST   Established Physical Therapy with Jeimy Sawant PTA   [facility] ([facility])    [address] [place] [address]   [phone]            Mar 07, 2017 11:00 AM CST   Established Physical Therapy with Mary Thomas PT   [facility] ([facility])    [address] [place] [address]   [phone]            Mar 09, 2017 10:00 AM CST   Established Patient Visit with Amilcar Guan DPM   [facility]    [address] [facility] [address]   [phone]            Mar 09, 2017 12:00 PM CST   Established Physical Therapy with Mary Thomas PT   [facility] [address]   [phone]            Mar 14, 2017 11:00 AM CDT   Established Physical Therapy with Jeimy Sawant PTA   [facility] ([facility])    [address] [place] [address]   [phone]              [facility] Sign-Up     Activating your [facility] account is as easy as 1-2-3!     1) Visit [email or web address], select Sign Up Now, enter this activation code and your date of birth, then select Next.  [email or web address]  Expires: 3/12/2017 10:12 AM      2) Create a username and password to use when you visit [facility] in the future and select a security question in case you lose your password and select Next.    3) Enter your e-mail  address and click Sign Up!    Additional Information  If you have questions, please e-mail [email or web address] or call [phone] to talk to our [facility] staff. Remember, [facility] is NOT to be used for urgent needs. For medical emergencies, dial 911.          [facility] complies with applicable Federal civil rights laws and does not discriminate on the basis of race, color, national origin, age, disability, or sex.        Language Assistance Services     ATTENTION: Language assistance services are available, free of charge. Please call [phone].      ATENCIÓN: Si habla español, tiene a sanchez disposición servicios gratuitos de asistencia lingüística. Llame al [phone].     CHÚ Ý: N?u b?n nói Ti?ng Vi?t, có các d?ch v? h? tr? ngôn ng? mi?n phí dành cho b?n. G?i s? [phone].

## 2017-03-01 ENCOUNTER — TELEPHONE (OUTPATIENT)
Dept: PODIATRY | Facility: CLINIC | Age: 47
End: 2017-03-01

## 2017-03-01 NOTE — TELEPHONE ENCOUNTER
----- Message from Milagro Murdock sent at 3/1/2017  8:23 AM CST -----  Contact: Patient  Patient called with questions stated that she went to ER yesterday due to heel spur. Please call back at [phone]. Thanks,

## 2017-03-01 NOTE — TELEPHONE ENCOUNTER
----- Message from Dennise Smith sent at 3/1/2017 10:12 AM CST -----  Contact: pt  Pt would like to speak to the nurse regarding went to the ER,have heel spur,can barely walk..[phone] (home)

## 2017-03-02 ENCOUNTER — CLINICAL SUPPORT (OUTPATIENT)
Dept: REHABILITATION | Facility: HOSPITAL | Age: 47
End: 2017-03-02
Attending: PODIATRIST
Payer: COMMERCIAL

## 2017-03-02 ENCOUNTER — OFFICE VISIT (OUTPATIENT)
Dept: PODIATRY | Facility: CLINIC | Age: 47
End: 2017-03-02
Payer: COMMERCIAL

## 2017-03-02 ENCOUNTER — HOSPITAL ENCOUNTER (OUTPATIENT)
Dept: RADIOLOGY | Facility: CLINIC | Age: 47
Discharge: HOME | End: 2017-03-02
Attending: PODIATRIST
Payer: COMMERCIAL

## 2017-03-02 VITALS — HEIGHT: 63 IN | WEIGHT: 181.44 LBS | BODY MASS INDEX: 32.15 KG/M2

## 2017-03-02 DIAGNOSIS — M24.573 EQUINUS CONTRACTURE OF ANKLE: ICD-10-CM

## 2017-03-02 DIAGNOSIS — M72.2 PLANTAR FASCIITIS: ICD-10-CM

## 2017-03-02 DIAGNOSIS — M79.672 FOOT PAIN, LEFT: ICD-10-CM

## 2017-03-02 DIAGNOSIS — M72.2 PLANTAR FASCIITIS: Primary | ICD-10-CM

## 2017-03-02 PROCEDURE — 73630 X-RAY EXAM OF FOOT: CPT | Mod: 26,LT,S$GLB, | Performed by: RADIOLOGY

## 2017-03-02 PROCEDURE — 97110 THERAPEUTIC EXERCISES: CPT | Mod: PN

## 2017-03-02 PROCEDURE — 99999 PR PBB SHADOW E&M-EST. PATIENT-LVL II: CPT | Mod: PBBFAC,,, | Performed by: PODIATRIST

## 2017-03-02 PROCEDURE — 99212 OFFICE O/P EST SF 10 MIN: CPT | Mod: S$GLB,,, | Performed by: PODIATRIST

## 2017-03-02 PROCEDURE — 1160F RVW MEDS BY RX/DR IN RCRD: CPT | Mod: S$GLB,,, | Performed by: PODIATRIST

## 2017-03-02 PROCEDURE — 73630 X-RAY EXAM OF FOOT: CPT | Mod: TC,PO,LT

## 2017-03-02 RX ORDER — MELOXICAM 15 MG/1
15 TABLET ORAL DAILY
Qty: 30 TABLET | Refills: 0 | Status: SHIPPED | OUTPATIENT
Start: 2017-03-02 | End: 2017-04-13 | Stop reason: SDUPTHER

## 2017-03-02 NOTE — PROGRESS NOTES
Name: Joselin Phillips  Clinic Number: [id number]  Date of Treatment: 03/02/2017   Diagnosis:   Encounter Diagnosis   Name Primary?    Plantar fasciitis        Time in: 1105  Time Out: 1200  Total Treatment Time: 55    Subjective:    Joselin reports increased pain.  Patient reports their plantar left heel pain to be 6/10 on a 0-10 scale with 0 being no pain and 10 being the worst pain imaginable. Reports she went back to work after last session and pain increased to 10/10, so she went to the ER. Saw Dr. Guan this morning. Discussed with Mary, PT and cleared to resume RX within pt tolerance.     Objective    Patient received individual therapy to increase strength, endurance, ROM and flexibility with activities as follows:     Joselin received therapeutic exercises to develop strength, endurance, ROM and flexibility for 55 minutes including:     NuStep L2 10' LE's only  Seated gastroc stretches B 3/30s with strap  Seated soleus stretches 3/30s B with strap  Seated plantar fascia stretches L with towel  Seated HR/TR B 10/3  Seated arch curls L 10/3  Seated towel scrunches L 10/3  Seated ankle 4 way L 10/3 with RTB  Seated ankle circles cw/ccw L 10/3  Seated marble pickup L x 3'    No manual today 2* discomfort.      Continue HEP daily tolerance only.    Pt demo good understanding of the education provided. Joselin demonstrated good return demonstration of activities.     Assessment:     Antalgic gait with decreased stance time L. Slow and guarded. Good tolerance for ex's without complaints of increased discomfort.     Pt will continue to benefit from skilled PT intervention. Medical Necessity is demonstrated by:  Requires skilled supervision to complete and progress HEP and Weakness.    Patient is making good progress towards established goals.    Plan:    Continue with established Plan of Care towards PT goals.

## 2017-03-02 NOTE — MR AVS SNAPSHOT
[facility]  [address] [address]  Phone: [phone]                  Joselin Phillips   3/2/2017 7:45 AM   Office Visit    Description:  Female : 1970   Provider:  Amilcar Guan DPM   Department:  [facility]           Reason for Visit     Foot Pain           Diagnoses this Visit        Comments    Plantar fasciitis    -  Primary     Foot pain, left         Equinus contracture of ankle                To Do List           Future Appointments        Provider Department Dept Phone    3/2/2017 8:15 AM [facility] XR1 [facility] [phone]    3/2/2017 11:00 AM Jeimy Sawant PTA [facility] [phone]    3/7/2017 11:00 AM Mary Thomas, PT [facility] [phone]    3/9/2017 12:00 PM Mary Thomas, PT [facility] [phone]    3/14/2017 11:00 AM Jeimy Sawant PTA [facility] [phone]      Goals (5 Years of Data)     None      Follow-Up and Disposition     Return in about 6 weeks (around 2017) for left plan fasc.    Follow-up and Disposition History       These Medications        Disp Refills Start End    meloxicam (MOBIC) 15 MG tablet 30 tablet 0 3/2/2017     Take 1 tablet (15 mg total) by mouth once daily. - Oral    Pharmacy: [facility] [address] - [place], [place] - [address] AT [facility] Ph #: [phone]         [facility] [place] [facility]     [facility] Care [facility] -  Assistance  Registered nurses in the [facility] provide clinical advisement, health education, appointment booking, and other advisory services.  Call for this free service at [phone].             Medications           Message regarding Medications     Verify the changes and/or additions to your medication regime listed below are the same as discussed with your clinician today.  If any of these changes or additions are incorrect, please  "notify your healthcare provider.             Verify that the below list of medications is an accurate representation of the medications you are currently taking.  If none reported, the list may be blank. If incorrect, please contact your healthcare provider. Carry this list with you in case of emergency.           Current Medications     cyclobenzaprine (FLEXERIL) 10 MG tablet Take 1 tablet (10 mg total) by mouth 3 (three) times daily as needed for Muscle spasms.    meloxicam (MOBIC) 15 MG tablet Take 1 tablet (15 mg total) by mouth once daily.           Clinical Reference Information           Your Vitals Were     Height                   5' 3" (1.6 m)           Allergies as of 3/2/2017     No Known Allergies      Immunizations Administered on Date of Encounter - 3/2/2017     None      Orders Placed During Today's Visit      Normal Orders This Visit    ORTHOTIC DEVICE (DME)     Future Labs/Procedures Expected by Expires    X-Ray Foot Complete Left  3/2/2017 3/3/2018      [facility] Sign-Up     Activating your [facility] account is as easy as 1-2-3!     1) Visit [email or web address], select Sign Up Now, enter this activation code and your date of birth, then select Next.  [email or web address]  Expires: 3/12/2017 10:12 AM      2) Create a username and password to use when you visit [facility] in the future and select a security question in case you lose your password and select Next.    3) Enter your e-mail address and click Sign Up!    Additional Information  If you have questions, please e-mail [email or web address] or call [phone] to talk to our [facility] staff. Remember, [facility] is NOT to be used for urgent needs. For medical emergencies, dial 911.         Language Assistance Services     ATTENTION: Language assistance services are available, free of charge. Please call [phone].      ATENCIÓN: Si habla español, tiene a sanchez disposición servicios gratuitos de asistencia lingüística. Llame al " [phone].     MAYA Ý: N?u b?n nói Ti?ng Vi?t, có các d?ch v? h? tr? ngôn ng? mi?n phí dành cho b?n. G?i s? [phone].         [facility] complies with applicable Federal civil rights laws and does not discriminate on the basis of race, color, national origin, age, disability, or sex.

## 2017-03-02 NOTE — PROGRESS NOTES
Subjective:      Patient ID: Joselin Phillips is a 46 y.o. female.    Chief Complaint: Foot Pain (left;Seen in ED)    Sharp deep pain bottom left heel. Minimal if any improvement from inserts, stretches, athletic shoes, one injection, PT, and fx boot, night splint (not wearing).  X-rays 10/2016 show inferior spur without acute injury.  ED visit yielded muscle relaxer and no relief.    Review of Systems   Constitution: Negative for chills, diaphoresis, fever, malaise/fatigue and night sweats.   Cardiovascular: Negative for claudication, cyanosis, leg swelling and syncope.   Skin: Negative for color change, dry skin, nail changes, rash, suspicious lesions and unusual hair distribution.   Musculoskeletal: Negative for falls, joint pain, joint swelling, muscle cramps, muscle weakness and stiffness.   Gastrointestinal: Negative for constipation, diarrhea, nausea and vomiting.   Neurological: Negative for brief paralysis, disturbances in coordination, focal weakness, numbness, paresthesias, sensory change and tremors.           Objective:      Physical Exam   Constitutional: She appears well-developed and well-nourished. She is cooperative. No distress.   Cardiovascular:   Pulses:       Popliteal pulses are 2+ on the right side, and 2+ on the left side.        Dorsalis pedis pulses are 2+ on the right side, and 2+ on the left side.        Posterior tibial pulses are 2+ on the right side, and 2+ on the left side.   Capillary refill 3 seconds all toes/distal feet, all toes/both feet warm to touch.      Negative lymphadenopathy bilateral popliteal fossa and tarsal tunnel.      Negavie lower extremity edema bilateral.     Musculoskeletal:   Sharp deep pain to palpation inferior heel left at medial calcaneal tubercle without ecchymosis, erythema, edema, or cardinal signs infection, and no signs of trauma.    Ankle dorsiflexion decreased at <10 degrees bilateral with moderate increase with knee flexion  bilateral.    Otherwise, Normal angle, base, station of gait. All ten toes without clubbing, cyanosis, or signs of ischemia.  No pain to palpation bilateral lower extremities.  Range of motion, stability, muscle strength, and muscle tone normal bilateral feet and legs.     Lymphadenopathy: No inguinal adenopathy noted on the right or left side.   Negative lymphadenopathy bilateral popliteal fossa and tarsal tunnel.   Neurological: She is alert. She has normal strength. She displays no atrophy and no tremor. No sensory deficit. She exhibits normal muscle tone. She displays no seizure activity. Gait normal.   Reflex Scores:       Patellar reflexes are 2+ on the right side and 2+ on the left side.       Achilles reflexes are 2+ on the right side and 2+ on the left side.  Negative tinel sign to percussion sural, superficial peroneal, deep peroneal, saphenous, and posterior tibial nerves right and left ankles and feet.     Skin: Skin is warm, dry and intact. No abrasion, no bruising, no burn, no ecchymosis, no laceration, no lesion and no rash noted. She is not diaphoretic. No cyanosis or erythema. No pallor. Nails show no clubbing.     Skin is normal age and health appropriate color, turgor, texture, and temperature bilateral lower extremities without ulceration, hyperpigmentation, discoloration, masses nodules or cords palpated.  No ecchymosis, erythema, edema, or cardinal signs of infection bilateral lower extremities.               Assessment:       Encounter Diagnoses   Name Primary?    Plantar fasciitis Yes    Foot pain, left     Equinus contracture of ankle          Plan:       Joselin was seen today for foot pain.    Diagnoses and all orders for this visit:    Plantar fasciitis  -     X-Ray Foot Complete Left; Future  -     ORTHOTIC DEVICE (DME)    Foot pain, left  -     X-Ray Foot Complete Left; Future  -     ORTHOTIC DEVICE (DME)    Equinus contracture of ankle  -     X-Ray Foot Complete Left; Future  -      ORTHOTIC DEVICE (DME)    Other orders  -     meloxicam (MOBIC) 15 MG tablet; Take 1 tablet (15 mg total) by mouth once daily.      I counseled the patient on her conditions, their implications and medical management.    I applied a plantar rest strapping to the patient's left foot to offload symptomatic area, support the arch, and relieve pain.    Continue stretches, PT,  inserts, athletic shoes, fx boot, nsaids.    Rx  custom orthotics, repeat films.          Return in about 6 weeks (around 4/13/2017) for left plan fasc.

## 2017-03-09 ENCOUNTER — CLINICAL SUPPORT (OUTPATIENT)
Dept: REHABILITATION | Facility: HOSPITAL | Age: 47
End: 2017-03-09
Attending: PODIATRIST
Payer: COMMERCIAL

## 2017-03-09 DIAGNOSIS — M72.2 PLANTAR FASCIITIS: ICD-10-CM

## 2017-03-09 PROCEDURE — 97110 THERAPEUTIC EXERCISES: CPT | Mod: PN

## 2017-03-09 NOTE — PROGRESS NOTES
Name: Joselin Phillips  Date:   03/09/2017  Primary Diagnosis: .  Problem List Items Addressed This Visit     Plantar fasciitis          Time in: 1208  Time Out: 1258  Total Treatment Time: 50  Group Time: 0      Subjective:    Patient reports she felt ok after last treatment session, but could barely walk the next day. States decreased pain today secondary to not having worked last night.  Patient reports their pain to be 4/10 in the left foot on a 0-10 scale with 0 being no pain and 10 being the worst pain imaginable.    Objective    Patient received individual therapy to address strength, endurance, ROM and flexibility with 0 other patients with activities as follows:     Patient received therapeutic exercises to develop strength, endurance, ROM and flexibility for 50 minutes including:     Bike x 10 min L2    Seated:    Gastroc stretch c/ strap 3/30sec  Soleus stretch c/ strap 3/30sec  PF stretch c/ towel 3/30sec  HR/TR 3x10  Ankle 4-way c/ RTB 3x10  Towel scrunches 3x10  Arch curls 3x10  Hamstring stretch 3/30sec  [place] pick-up x 3 min     Assessment:     Continued to avoid MT due to tolerance issues. Pain 4-5/10 at end of session.     Pt will continue to benefit from skilled PT intervention. Medical Necessity is demonstrated by:  Pain limits function of effected part for some activities, Unable to participate fully in daily activities and Weakness.    Patient is making good progress towards established goals.    Plan:  Continue with established Plan of Care towards PT goals.

## 2017-03-16 ENCOUNTER — CLINICAL SUPPORT (OUTPATIENT)
Dept: REHABILITATION | Facility: HOSPITAL | Age: 47
End: 2017-03-16
Attending: PODIATRIST
Payer: COMMERCIAL

## 2017-03-16 DIAGNOSIS — M72.2 PLANTAR FASCIITIS: ICD-10-CM

## 2017-03-16 NOTE — PLAN OF CARE
Name: Joselin Phillips  Date:   03/16/2017  Primary Diagnosis: .  Problem List Items Addressed This Visit     Plantar fasciitis          Time in: 1105  Time Out: 1110  Total Treatment Time: 35  Group Time: 0      Subjective:    Patient reports minimal change in s/s since start of physical therapy. Reports she is hoping for the surgery since her coworker had good results. Continues to have a lot of pain with her job, which requires standing all day.  Patient reports their pain to be 5/10 in the left foot on a 0-10 scale with 0 being no pain and 10 being the worst pain imaginable.    Objective    Reassessment performed for POC update purposes    Ankle Right   Left   Pain/Dysfunction with Movement     PROM MMT PROM MMT     Dorsiflexion WFL 5/5 -2* 4/5     Planarflexion WFL 5/5 34* 4/5     Inversion WFL 5/5 36* 4/5     Eversion WFL 5/5 16* 4/5        *pain with all resisted motions of left ankle    Gait: antalgic  Lower Extremity Functional Scale (LEFS): 15/80    Assessment:     Patient with improved ROM and strength of left foot/ankle today. However, pt reporting very minimal change in her pain level or functional mobility since initial evaluation. She continues to have severe pain with the extended standing required by her job. She reports wanting to have surgery. Will discharge to [facility] today.      Plan:  D/C to [facility]    REHAB SERVICES OUTPATIENT DISCHARGE SUMMARY  Physical Therapy      Name:  Joselin Phillips  Date:  03/16/17  Date of Evaluation:  02/06/17  Physician:  Dr. Guan  Total # Of Visits:  See EMR  Cancelled:  See EMR  No Shows:  See EMR  Diagnosis:    1. Plantar fasciitis           The patient is to be discharged from our Therapy service for the following reason(s):  Patient has reached the maximum rehab potential for the present time and Patient requested discharge    Degree of Goal Achievement:  Patient has partially met goals    Patient Education:  HEP    Discharge Plan:  Home Program:

## 2017-04-13 ENCOUNTER — OFFICE VISIT (OUTPATIENT)
Dept: PODIATRY | Facility: CLINIC | Age: 47
End: 2017-04-13
Payer: COMMERCIAL

## 2017-04-13 VITALS — BODY MASS INDEX: 27.33 KG/M2 | WEIGHT: 184.5 LBS | HEIGHT: 69 IN

## 2017-04-13 DIAGNOSIS — M72.2 PLANTAR FASCIITIS: Primary | ICD-10-CM

## 2017-04-13 DIAGNOSIS — M79.672 FOOT PAIN, LEFT: ICD-10-CM

## 2017-04-13 DIAGNOSIS — M24.573 EQUINUS CONTRACTURE OF ANKLE: ICD-10-CM

## 2017-04-13 PROCEDURE — 1160F RVW MEDS BY RX/DR IN RCRD: CPT | Mod: S$GLB,,, | Performed by: PODIATRIST

## 2017-04-13 PROCEDURE — 99213 OFFICE O/P EST LOW 20 MIN: CPT | Mod: 25,S$GLB,, | Performed by: PODIATRIST

## 2017-04-13 PROCEDURE — 99999 PR PBB SHADOW E&M-EST. PATIENT-LVL III: CPT | Mod: PBBFAC,,, | Performed by: PODIATRIST

## 2017-04-13 PROCEDURE — 29540 STRAPPING ANKLE &/FOOT: CPT | Mod: LT,S$GLB,, | Performed by: PODIATRIST

## 2017-04-13 RX ORDER — MELOXICAM 15 MG/1
15 TABLET ORAL DAILY
Qty: 30 TABLET | Refills: 0 | Status: SHIPPED | OUTPATIENT
Start: 2017-04-13 | End: 2017-06-07 | Stop reason: SDUPTHER

## 2017-04-13 NOTE — MR AVS SNAPSHOT
[facility]  [address] [place] [address]  Phone: [phone]                  Joselin Phillips   2017 7:00 AM   Office Visit    Description:  Female : 1970   Provider:  Amilcar Guan DPM   Department:  [facility]           Reason for Visit     Foot Pain           Diagnoses this Visit        Comments    Plantar fasciitis    -  Primary     Foot pain, left         Equinus contracture of ankle                To Do List           Goals (5 Years of Data)     None      Follow-Up and Disposition     Return in about 1 month (around 2017) for left plan fasc.       These Medications        Disp Refills Start End    meloxicam (MOBIC) 15 MG tablet 30 tablet 0 2017     Take 1 tablet (15 mg total) by mouth once daily. - Oral    Pharmacy: [facility] [address] [place] [address] [place] - [address] AT [facility] Ph #: [phone]         [facility] On [facility]     [facility] On Call Nurse Care Line -  Assistance  Unless otherwise directed by your provider, please contact [facility], our nurse care line that is available for  assistance.     Registered nurses in the [facility] Center provide: appointment scheduling, clinical advisement, health education, and other advisory services.  Call: [phone] (toll free)               Medications           Message regarding Medications     Verify the changes and/or additions to your medication regime listed below are the same as discussed with your clinician today.  If any of these changes or additions are incorrect, please notify your healthcare provider.             Verify that the below list of medications is an accurate representation of the medications you are currently taking.  If none reported, the list may be blank. If incorrect, please contact your healthcare provider. Carry this list with you in case of emergency.           Current Medications     meloxicam (MOBIC) 15 MG tablet Take  "1 tablet (15 mg total) by mouth once daily.           Clinical Reference Information           Your Vitals Were     Height Weight BMI          5' 9" (1.753 m) 83.7 kg (184 lb 8.4 oz) 27.25 kg/m2        Allergies as of 4/13/2017     No Known Allergies      Immunizations Administered on Date of Encounter - 4/13/2017     None      Orders Placed During Today's Visit      Normal Orders This Visit    Ambulatory consult to Physical Therapy     ORTHOTIC DEVICE (DME)       [facility] Sign-Up     Activating your [facility] account is as easy as 1-2-3!     1) Visit [email or web address], select Sign Up Now, enter this activation code and your date of birth, then select Next.  [email or web address]  Expires: 5/28/2017  7:31 AM      2) Create a username and password to use when you visit [facility] in the future and select a security question in case you lose your password and select Next.    3) Enter your e-mail address and click Sign Up!    Additional Information  If you have questions, please e-mail [email or web address] or call [phone] to talk to our [email or web address] staff. Remember, [facility] is NOT to be used for urgent needs. For medical emergencies, dial 911.         Language Assistance Services     ATTENTION: Language assistance services are available, free of charge. Please call [phone].      ATENCIÓN: Si habla español, tiene a sanchez disposición servicios gratuitos de asistencia lingüística. Llame al [phone].     CHÚ Ý: N?u b?n nói Ti?ng Vi?t, có các d?ch v? h? tr? ngôn ng? mi?n phí dành cho b?n. G?i s? [phone].         [facility] complies with applicable Federal civil rights laws and does not discriminate on the basis of race, color, national origin, age, disability, or sex.        "

## 2017-04-13 NOTE — PROGRESS NOTES
Subjective:      Patient ID: Joselin Phillips is a 46 y.o. female.    Chief Complaint: Foot Pain (bilateral)    Sharp deep pain bottom left heel. Minimal if any improvement from inserts, stretches, athletic shoes, one injection, PT, and fx boot, night splint (not wearing).  X-rays 3/2/2017 show inferior spur without acute injury.  ED visit yielded muscle relaxer and no relief.    Review of Systems   Constitution: Negative for chills, diaphoresis, fever, malaise/fatigue and night sweats.   Cardiovascular: Negative for claudication, cyanosis, leg swelling and syncope.   Skin: Negative for color change, dry skin, nail changes, rash, suspicious lesions and unusual hair distribution.   Musculoskeletal: Negative for falls, joint pain, joint swelling, muscle cramps, muscle weakness and stiffness.   Gastrointestinal: Negative for constipation, diarrhea, nausea and vomiting.   Neurological: Negative for brief paralysis, disturbances in coordination, focal weakness, numbness, paresthesias, sensory change and tremors.           Objective:      Physical Exam   Constitutional: She appears well-developed and well-nourished. She is cooperative. No distress.   Cardiovascular:   Pulses:       Popliteal pulses are 2+ on the right side, and 2+ on the left side.        Dorsalis pedis pulses are 2+ on the right side, and 2+ on the left side.        Posterior tibial pulses are 2+ on the right side, and 2+ on the left side.   Capillary refill 3 seconds all toes/distal feet, all toes/both feet warm to touch.      Negative lymphadenopathy bilateral popliteal fossa and tarsal tunnel.      Negavie lower extremity edema bilateral.     Musculoskeletal:   Sharp deep pain to palpation inferior heel left at medial calcaneal tubercle without ecchymosis, erythema, edema, or cardinal signs infection, and no signs of trauma.    Ankle dorsiflexion decreased at <10 degrees bilateral with moderate increase with knee flexion bilateral.    Otherwise,  Normal angle, base, station of gait. All ten toes without clubbing, cyanosis, or signs of ischemia.  No pain to palpation bilateral lower extremities.  Range of motion, stability, muscle strength, and muscle tone normal bilateral feet and legs.     Lymphadenopathy:   Negative lymphadenopathy bilateral popliteal fossa and tarsal tunnel.   Neurological: She is alert. She has normal strength. She displays no atrophy and no tremor. No sensory deficit. She exhibits normal muscle tone. She displays no seizure activity. Gait normal.   Reflex Scores:       Patellar reflexes are 2+ on the right side and 2+ on the left side.       Achilles reflexes are 2+ on the right side and 2+ on the left side.  Negative tinel sign to percussion sural, superficial peroneal, deep peroneal, saphenous, and posterior tibial nerves right and left ankles and feet.     Skin: Skin is warm, dry and intact. No abrasion, no bruising, no burn, no ecchymosis, no laceration, no lesion and no rash noted. She is not diaphoretic. No cyanosis or erythema. No pallor. Nails show no clubbing.     Skin is normal age and health appropriate color, turgor, texture, and temperature bilateral lower extremities without ulceration, hyperpigmentation, discoloration, masses nodules or cords palpated.  No ecchymosis, erythema, edema, or cardinal signs of infection bilateral lower extremities.               Assessment:       Encounter Diagnoses   Name Primary?    Plantar fasciitis Yes    Foot pain, left     Equinus contracture of ankle          Plan:       Joselin was seen today for foot pain.    Diagnoses and all orders for this visit:    Plantar fasciitis  -     Ambulatory consult to Physical Therapy  -     ORTHOTIC DEVICE (DME)    Foot pain, left  -     Ambulatory consult to Physical Therapy  -     ORTHOTIC DEVICE (DME)    Equinus contracture of ankle  -     Ambulatory consult to Physical Therapy  -     ORTHOTIC DEVICE (DME)    Other orders  -     meloxicam  (MOBIC) 15 MG tablet; Take 1 tablet (15 mg total) by mouth once daily.      I counseled the patient on her conditions, their implications and medical management.    I applied a plantar rest strapping to the patient's left foot to offload symptomatic area, support the arch, and relieve pain.    Continue stretches, PT,  inserts, athletic shoes, fx boot, nsaids prn (refilled).    Rx  custom orthotics, PT.    Counseled on conservative treatments including shoe and activity change, physical therapy, antiinflammatory, and pain medication, and sometimes injections for symptomatic relief.    Counseled on surgical correction, EPF left, open if needed -risks and benefits, including, but not limited to recurrence, infection, pain, scar, poor cosmesis, loss of function, nerve pain, nerve damage, numbness, syndromes (RSD), need for future surgical procedures, and or long term use of orthotics, blood clots of leg, lung, heart, brain, death.    Consider carefully, appoint with pcp for surgical clearance, follow here prn as symptoms dictate for consent, post op Rx, and scheduling.            Return in about 1 month (around 5/13/2017) for left plan fasc.

## 2017-05-29 ENCOUNTER — DOCUMENTATION ONLY (OUTPATIENT)
Dept: FAMILY MEDICINE | Facility: CLINIC | Age: 47
End: 2017-05-29

## 2017-05-29 NOTE — PROGRESS NOTES
Pre-Visit Chart Review  For Appointment Scheduled on (05/30/17)    Health Maintenance Due   Topic Date Due    Lipid Panel  1970    TETANUS VACCINE  12/13/1986    Pap Smear with HPV Cotest  12/13/1989    Mammogram  12/13/2008

## 2017-05-30 ENCOUNTER — OFFICE VISIT (OUTPATIENT)
Dept: FAMILY MEDICINE | Facility: CLINIC | Age: 47
End: 2017-05-30
Payer: COMMERCIAL

## 2017-05-30 VITALS
WEIGHT: 189.63 LBS | DIASTOLIC BLOOD PRESSURE: 78 MMHG | HEIGHT: 69 IN | BODY MASS INDEX: 28.08 KG/M2 | HEART RATE: 90 BPM | SYSTOLIC BLOOD PRESSURE: 128 MMHG

## 2017-05-30 DIAGNOSIS — Z00.00 WELLNESS EXAMINATION: ICD-10-CM

## 2017-05-30 DIAGNOSIS — Z12.31 ENCOUNTER FOR SCREENING MAMMOGRAM FOR MALIGNANT NEOPLASM OF BREAST: ICD-10-CM

## 2017-05-30 DIAGNOSIS — Z76.89 ESTABLISHING CARE WITH NEW DOCTOR, ENCOUNTER FOR: Primary | ICD-10-CM

## 2017-05-30 DIAGNOSIS — R35.0 URINARY FREQUENCY: ICD-10-CM

## 2017-05-30 PROCEDURE — 99999 PR PBB SHADOW E&M-EST. PATIENT-LVL III: CPT | Mod: PBBFAC,,, | Performed by: FAMILY MEDICINE

## 2017-05-30 PROCEDURE — 99204 OFFICE O/P NEW MOD 45 MIN: CPT | Mod: S$GLB,,, | Performed by: FAMILY MEDICINE

## 2017-05-30 NOTE — PROGRESS NOTES
Subjective:       Patient ID: Joselin Phillips is a 46 y.o. female.    Chief Complaint: Establish Care    HPI     Establish Care  Pt reports to the clinic to establish care.   The pt has a medical history which includes plantar fasciitis.   As far as smoking is concerned, the pt denies.  The pt attempts to maintain a healthy diet and engages in regular exercise.   Consistent seatbelt usage reported.   Pt has no symptoms of depression.   Health maintenance addressed and updated in chart.    Review of Systems   Constitutional: Negative for chills and fever.   HENT: Negative for sore throat.    Eyes: Negative for visual disturbance.   Respiratory: Negative for cough and shortness of breath.    Cardiovascular: Negative for chest pain and leg swelling.   Gastrointestinal: Negative for abdominal pain, blood in stool, constipation, diarrhea and vomiting.   Genitourinary: Positive for frequency. Negative for difficulty urinating, dysuria and hematuria.   Musculoskeletal: Negative for arthralgias.   Neurological: Negative for dizziness and weakness.       Objective:      Physical Exam   Constitutional: She appears well-developed and well-nourished. No distress.   HENT:   Head: Normocephalic and atraumatic.   Mouth/Throat: Oropharynx is clear and moist. No oropharyngeal exudate.   Eyes: EOM are normal. Pupils are equal, round, and reactive to light.   Neck: Normal range of motion. Neck supple. No thyromegaly present.   Cardiovascular: Normal rate, regular rhythm, normal heart sounds and intact distal pulses.    Pulmonary/Chest: Effort normal and breath sounds normal. No respiratory distress. She has no wheezes.   Abdominal: Soft. Bowel sounds are normal. She exhibits no distension and no mass. There is no tenderness.   Musculoskeletal: She exhibits no edema.   Lymphadenopathy:     She has no cervical adenopathy.   Neurological: She is alert.   Skin: Skin is warm. No rash noted. No erythema.   Psychiatric: She has a normal  mood and affect. Her behavior is normal.   Vitals reviewed.      Assessment:       1. Establishing care with new doctor, encounter for    2. Wellness examination        Plan:       1. Establishing care with new doctor, encounter for  Patient has been advised to continue to maintain a healthy lifestyle, including regular exercise and consuming a well balanced diet.   - Lipid panel; Future  - Basic metabolic panel; Future  - URINALYSIS    2. Wellness examination  - Lipid panel; Future  - Basic metabolic panel; Future  - URINALYSIS    3. Urinary frequency  UA on today.     4. Encounter for screening mammogram for malignant neoplasm of breast  - Mammo Digital Screening Bilateral With CAD; Future    Portions of this note were created using Dragon voice recognition software. There may be voice recognition errors found in the text, and attempts were made to correct these errors prior to signature    Jeison Taylor MD    Family Medicine  5/30/2017

## 2017-06-01 ENCOUNTER — HOSPITAL ENCOUNTER (OUTPATIENT)
Dept: RADIOLOGY | Facility: CLINIC | Age: 47
Discharge: HOME | End: 2017-06-01
Attending: FAMILY MEDICINE
Payer: COMMERCIAL

## 2017-06-01 DIAGNOSIS — Z12.31 ENCOUNTER FOR SCREENING MAMMOGRAM FOR MALIGNANT NEOPLASM OF BREAST: ICD-10-CM

## 2017-06-01 PROCEDURE — 77067 SCR MAMMO BI INCL CAD: CPT | Mod: 26,,, | Performed by: RADIOLOGY

## 2017-06-01 PROCEDURE — 77063 BREAST TOMOSYNTHESIS BI: CPT | Mod: 26,,, | Performed by: RADIOLOGY

## 2017-06-01 PROCEDURE — 77067 SCR MAMMO BI INCL CAD: CPT | Mod: TC

## 2017-06-02 ENCOUNTER — TELEPHONE (OUTPATIENT)
Dept: FAMILY MEDICINE | Facility: CLINIC | Age: 47
End: 2017-06-02

## 2017-06-02 NOTE — TELEPHONE ENCOUNTER
----- Message from Bethany Michaels sent at 6/2/2017  9:07 AM CDT -----  Contact: self work# [phone] when the call is answered please ask for Thalia  Call placed to pod.  Patient returned your call, please call back.  Thank you!

## 2017-06-05 ENCOUNTER — TELEPHONE (OUTPATIENT)
Dept: FAMILY MEDICINE | Facility: CLINIC | Age: 47
End: 2017-06-05

## 2017-06-05 NOTE — TELEPHONE ENCOUNTER
----- Message from Kathya Gordon sent at 6/5/2017 12:09 PM CDT -----  Contact: Patient  Patient is looking for her results on her Mammo. Please call her at [phone].

## 2017-06-06 ENCOUNTER — TELEPHONE (OUTPATIENT)
Dept: PODIATRY | Facility: CLINIC | Age: 47
End: 2017-06-06

## 2017-06-06 NOTE — TELEPHONE ENCOUNTER
----- Message from Katiana Mcdaniel sent at 6/5/2017  2:19 PM CDT -----  Contact: self [phone]  Patient is requesting a call back from the nurse want to know when she's due back to see dr houser.   Please call the patient upon request at phone number [phone].

## 2017-06-07 ENCOUNTER — OFFICE VISIT (OUTPATIENT)
Dept: PODIATRY | Facility: CLINIC | Age: 47
End: 2017-06-07
Payer: COMMERCIAL

## 2017-06-07 VITALS — HEIGHT: 69 IN | WEIGHT: 186.5 LBS | BODY MASS INDEX: 27.62 KG/M2

## 2017-06-07 DIAGNOSIS — M79.672 FOOT PAIN, LEFT: ICD-10-CM

## 2017-06-07 DIAGNOSIS — M24.573 EQUINUS CONTRACTURE OF ANKLE: ICD-10-CM

## 2017-06-07 DIAGNOSIS — M72.2 PLANTAR FASCIITIS: Primary | ICD-10-CM

## 2017-06-07 PROCEDURE — 99999 PR PBB SHADOW E&M-EST. PATIENT-LVL III: CPT | Mod: PBBFAC,,, | Performed by: PODIATRIST

## 2017-06-07 PROCEDURE — 99213 OFFICE O/P EST LOW 20 MIN: CPT | Mod: 25,S$GLB,, | Performed by: PODIATRIST

## 2017-06-07 PROCEDURE — 29540 STRAPPING ANKLE &/FOOT: CPT | Mod: LT,S$GLB,, | Performed by: PODIATRIST

## 2017-06-07 RX ORDER — MELOXICAM 15 MG/1
15 TABLET ORAL DAILY
Qty: 30 TABLET | Refills: 0 | Status: SHIPPED | OUTPATIENT
Start: 2017-06-07 | End: 2017-07-27 | Stop reason: SDUPTHER

## 2017-06-07 NOTE — PROGRESS NOTES
Subjective:      Patient ID: Joselin Phillips is a 46 y.o. female.    Chief Complaint: Foot Pain (left)    Sharp deep pain bottom left heel. Minimal if any improvement from inserts, stretches, athletic shoes, one injection, and night splint (not wearing).  Didn't attend PT. X-rays 3/2/2017 show inferior spur without acute injury.  Denies trauma since xray.    Review of Systems   Constitution: Negative for chills, diaphoresis, fever, malaise/fatigue and night sweats.   Cardiovascular: Negative for claudication, cyanosis, leg swelling and syncope.   Skin: Negative for color change, dry skin, nail changes, rash, suspicious lesions and unusual hair distribution.   Musculoskeletal: Negative for falls, joint pain, joint swelling, muscle cramps, muscle weakness and stiffness.   Gastrointestinal: Negative for constipation, diarrhea, nausea and vomiting.   Neurological: Negative for brief paralysis, disturbances in coordination, focal weakness, numbness, paresthesias, sensory change and tremors.           Objective:      Physical Exam   Constitutional: She appears well-developed and well-nourished. She is cooperative. No distress.   Cardiovascular:   Pulses:       Popliteal pulses are 2+ on the right side, and 2+ on the left side.        Dorsalis pedis pulses are 2+ on the right side, and 2+ on the left side.        Posterior tibial pulses are 2+ on the right side, and 2+ on the left side.   Capillary refill 3 seconds all toes/distal feet, all toes/both feet warm to touch.      Negative lymphadenopathy bilateral popliteal fossa and tarsal tunnel.      Negavie lower extremity edema bilateral.     Musculoskeletal:   Sharp deep pain to palpation inferior heel left at medial calcaneal tubercle without ecchymosis, erythema, edema, or cardinal signs infection, and no signs of trauma.    Ankle dorsiflexion decreased at <10 degrees bilateral with moderate increase with knee flexion bilateral.    Otherwise, Normal angle, base,  station of gait. All ten toes without clubbing, cyanosis, or signs of ischemia.  No pain to palpation bilateral lower extremities.  Range of motion, stability, muscle strength, and muscle tone normal bilateral feet and legs.     Lymphadenopathy:   Negative lymphadenopathy bilateral popliteal fossa and tarsal tunnel.   Neurological: She is alert. She has normal strength. She displays no atrophy and no tremor. No sensory deficit. She exhibits normal muscle tone. She displays no seizure activity. Gait normal.   Reflex Scores:       Patellar reflexes are 2+ on the right side and 2+ on the left side.       Achilles reflexes are 2+ on the right side and 2+ on the left side.  Negative tinel sign to percussion sural, superficial peroneal, deep peroneal, saphenous, and posterior tibial nerves right and left ankles and feet.     Skin: Skin is warm, dry and intact. No abrasion, no bruising, no burn, no ecchymosis, no laceration, no lesion and no rash noted. She is not diaphoretic. No cyanosis or erythema. No pallor. Nails show no clubbing.     Skin is normal age and health appropriate color, turgor, texture, and temperature bilateral lower extremities without ulceration, hyperpigmentation, discoloration, masses nodules or cords palpated.  No ecchymosis, erythema, edema, or cardinal signs of infection bilateral lower extremities.               Assessment:       Encounter Diagnoses   Name Primary?    Plantar fasciitis Yes    Foot pain, left     Equinus contracture of ankle          Plan:       Joselin was seen today for foot pain.    Diagnoses and all orders for this visit:    Plantar fasciitis  -     Ambulatory consult to Physical Therapy  -     ORTHOTIC DEVICE (DME)    Foot pain, left  -     Ambulatory consult to Physical Therapy  -     ORTHOTIC DEVICE (DME)    Equinus contracture of ankle  -     Ambulatory consult to Physical Therapy  -     ORTHOTIC DEVICE (DME)    Other orders  -     meloxicam (MOBIC) 15 MG tablet;  Take 1 tablet (15 mg total) by mouth once daily.      I counseled the patient on her conditions, their implications and medical management.    I applied a plantar rest strapping to the patient's left foot to offload symptomatic area, support the arch, and relieve pain.    Continue stretches, inserts, athletic shoes, fx boot prn, nsaids prn (refilled).    Rx  custom orthotics, PT.    Declines injection, surgical discussion.        Return in about 1 month (around 7/7/2017) for pf left.

## 2017-06-16 ENCOUNTER — CLINICAL SUPPORT (OUTPATIENT)
Dept: REHABILITATION | Facility: HOSPITAL | Age: 47
End: 2017-06-16
Attending: PODIATRIST
Payer: COMMERCIAL

## 2017-06-16 DIAGNOSIS — M72.2 PLANTAR FASCIITIS: Primary | ICD-10-CM

## 2017-06-16 DIAGNOSIS — M79.672 LEFT FOOT PAIN: ICD-10-CM

## 2017-06-16 PROCEDURE — 97161 PT EVAL LOW COMPLEX 20 MIN: CPT | Mod: PN

## 2017-06-16 NOTE — PLAN OF CARE
TIME RECORD    Date: 06/16/2017    Start Time:  1600  Stop Time:  1630    PROCEDURES:    TIMED  Procedure Time Min.    Start:  Stop:     Start:  Stop:     Start:  Stop:     Start:  Stop:          UNTIMED  Procedure Time Min.   eval Start:1600  Stop:1630     Start:  Stop:      Total Timed Minutes:  0  Total Timed Units:  0  Total Untimed Units:  1  Charges Billed/# of units:  1    OUTPATIENT PHYSICAL THERAPY   PATIENT EVALUATION  Onset Date: 03/02/17  Primary Diagnosis:   1. Plantar fasciitis     2. Left foot pain       Treatment Diagnosis: gait abnormality  No past medical history on file.  Precautions: pain w/ WB  Prior Therapy: 6 wks of outpt PT tried in the past - little benefit  Medications: Joselin Phillips has a current medication list which includes the following prescription(s): meloxicam.  Nutrition:  Normal  History of Present Illness: reports long hx of lt foot pain along plantar aspect; has tried injections, PT, pain meds, ice - very little benefit; states she has a bone spur in her lt heel  Prior Level of Function: Independent  Social History: works at [employer] - on her feet for most of the day  Place of Residence (Steps/Adaptations): lives w/ family in 1-story home  Functional Deficits Leading to Referral/Nature of Injury: reports high levels of pain in her left foot - limits her ability to perform her work and home related activities  Patient Therapy Goals: decrease pain in her left foot    Subjective     Joselin Phillips states that her lt foot pain is debilitating and the PT has not helped in the past.    Pain:  Location: lt foot (plantar aspect)  Description: Sharp  Activities Which Increase Pain: activity  Activities Which Decrease Pain: rest  Pain Scale: 0/10 at best 10/10 now  10/10 at worst    Objective     Posture: guarded due to pain  Palpation: pain w/ palpation to affected area  Sensation: intact  DTRs:  Range of Motion/Strength: MMT = grossly 4+/5 throughout elian.  LE's; AROM = lt foot guarded due to pain, WFL elsewhere    Flexibility: guarded due to pain in lt foot  Gait: Without AD  Analysis: decreased stance phase lt side  Bed Mobility:Independent  Transfers: Independent  Special Tests: NA  Other: NA  Treatment: NA    Assessment       Initial Assessment (Pertinent finding, problem list and factors affecting outcome): presents w/ severe pain in plantar aspect of lt foot; PT attempt in the past was unsuccessful; injections, ice, and pain meds have all had little effect; PT not likely to be beneficial - eval only  Rehab Potiential: NA    Short Term Goals (NA Weeks):   Long Term Goals (NA Weeks):     Plan     Certification Period: 06/16/17 to 06/16/17  Recommended Treatment Plan: NA times per week for NA weeks:   Other Recommendations: NA      Therapist: Bill Chacko, PT    I CERTIFY THE NEED FOR THESE SERVICES FURNISHED UNDER THIS PLAN OF TREATMENT AND WHILE UNDER MY CARE    Physician's comments: ________________________________________________________________________________________________________________________________________________      Physician's Name: ___________________________________

## 2017-06-29 ENCOUNTER — TELEPHONE (OUTPATIENT)
Dept: FAMILY MEDICINE | Facility: CLINIC | Age: 47
End: 2017-06-29

## 2017-06-29 NOTE — TELEPHONE ENCOUNTER
----- Message from Rojas Murillo sent at 6/29/2017  8:17 AM CDT -----  Contact: pt  Pt is returning call, call placed to pod no answer  Call Back#[phone]  Thanks

## 2017-06-30 ENCOUNTER — OFFICE VISIT (OUTPATIENT)
Dept: FAMILY MEDICINE | Facility: CLINIC | Age: 47
End: 2017-06-30
Payer: COMMERCIAL

## 2017-06-30 VITALS
HEART RATE: 88 BPM | DIASTOLIC BLOOD PRESSURE: 78 MMHG | HEIGHT: 64 IN | BODY MASS INDEX: 32.41 KG/M2 | SYSTOLIC BLOOD PRESSURE: 123 MMHG | WEIGHT: 189.81 LBS

## 2017-06-30 DIAGNOSIS — Z12.4 CERVICAL CANCER SCREENING: Primary | ICD-10-CM

## 2017-06-30 PROCEDURE — 99999 PR PBB SHADOW E&M-EST. PATIENT-LVL III: CPT | Mod: PBBFAC,,, | Performed by: FAMILY MEDICINE

## 2017-06-30 PROCEDURE — 88175 CYTOPATH C/V AUTO FLUID REDO: CPT

## 2017-06-30 PROCEDURE — 99213 OFFICE O/P EST LOW 20 MIN: CPT | Mod: S$GLB,,, | Performed by: FAMILY MEDICINE

## 2017-06-30 NOTE — PROGRESS NOTES
"Subjective:       Joselin Phillips is a 46 y.o. woman who comes in today for a  pap smear only. Her most recent annual exam was on 5/30/17.  Patient denies history of abnormal Pap smear    The following portions of the patient's history were reviewed and updated as appropriate: allergies, current medications, past family history, past medical history, past social history, past surgical history and problem list.    Review of Systems  Constitutional: For fever or chills  Cardiovascular: Negative for chest pain/shortness of breath  Abdominal: For tenderness/nausea/vomiting   negative for abnormal menstruation.    Objective:      /78   Pulse 88   Ht 5' 4" (1.626 m)   Wt 86.1 kg (189 lb 13.1 oz)   BMI 32.58 kg/m²   Pelvic Exam: cervix normal in appearance, external genitalia normal and vagina normal without discharge. Pap smear obtained.     Assessment:      Screening pap smear.     Plan:         Follow up at next scheduled visit.  Pt to be contacted with results.    Portions of this note were created using Dragon voice recognition software. There may be voice recognition errors found in the text, and attempts were made to correct these errors prior to signature    Jeison Taylor MD    Family Medicine  6/30/2017    "

## 2017-07-12 ENCOUNTER — TELEPHONE (OUTPATIENT)
Dept: ENDOCRINOLOGY | Facility: CLINIC | Age: 47
End: 2017-07-12

## 2017-07-12 NOTE — TELEPHONE ENCOUNTER
----- Message from Yazmin Randolph sent at 7/12/2017  3:15 PM CDT -----  Contact: self  Patient would like to speak to a nurse regarding her periods and a pap she had    Please call her at  to advise.     Thanks

## 2017-07-12 NOTE — TELEPHONE ENCOUNTER
Patient calling to report that she had her pap in office on 6/30/17, she then started what she thought was her menstrual cycle. She has had small flow and spotting since her pap and has had to use tampon/ pad liners at times. Denies pain or fatigue. She stated that she has a irregular cycle but it usually last no more than 4 days. Reports she has never had period lasting this long. Please advise

## 2017-07-13 ENCOUNTER — TELEPHONE (OUTPATIENT)
Dept: FAMILY MEDICINE | Facility: CLINIC | Age: 47
End: 2017-07-13

## 2017-07-13 ENCOUNTER — OFFICE VISIT (OUTPATIENT)
Dept: PODIATRY | Facility: CLINIC | Age: 47
End: 2017-07-13
Payer: COMMERCIAL

## 2017-07-13 VITALS — WEIGHT: 187.38 LBS | HEIGHT: 64 IN | BODY MASS INDEX: 31.99 KG/M2

## 2017-07-13 DIAGNOSIS — M72.2 PLANTAR FASCIITIS: Primary | ICD-10-CM

## 2017-07-13 DIAGNOSIS — M79.672 FOOT PAIN, LEFT: ICD-10-CM

## 2017-07-13 DIAGNOSIS — M24.573 EQUINUS CONTRACTURE OF ANKLE: ICD-10-CM

## 2017-07-13 PROCEDURE — 99212 OFFICE O/P EST SF 10 MIN: CPT | Mod: S$GLB,,, | Performed by: PODIATRIST

## 2017-07-13 PROCEDURE — 99999 PR PBB SHADOW E&M-EST. PATIENT-LVL II: CPT | Mod: PBBFAC,,, | Performed by: PODIATRIST

## 2017-07-13 NOTE — PROGRESS NOTES
Reviewed resident note, exam and procedures were performed under my direct supervision.  Agree with note and care.  Discrepancies discussed.    Counseled on conservative treatments including shoe and activity change, physical therapy, antiinflammatory, and pain medication, and sometimes injections for symptomatic relief.    Counseled on surgical correction, risks and benefits, including, but not limited to recurrence, arthritis,  infection, pain, scar, poor cosmesis, loss of function, nerve pain, nerve damage, numbness, syndromes (RSD), need for future surgical procedures, and or long term use of orthotics, blood clots of leg, lung, heart, brain, death.    Consider carefully, appoint with pcp for surgical clearance, follow here prn as symptoms dictate for consent, post op Rx, and scheduling.

## 2017-07-13 NOTE — TELEPHONE ENCOUNTER
----- Message from Jessica Pathak sent at 7/13/2017  2:39 PM CDT -----  Contact: Pt walked in  Pt had an appt with Dr Guan today.  Pt is schedule to have surgery on 7/20/17 and needs surgery clearance from Dr Taylor before then.  Is there any way you can fit her in for surgery clearance before 7/20? Please call the pt.

## 2017-07-13 NOTE — PROGRESS NOTES
Subjective:      Patient ID: Joselin Phillips is a 46 y.o. female.    Chief Complaint: Foot Pain (Left )    Sharp deep pain bottom left heel. Minimal if any improvement from inserts, stretches, athletic shoes, one injection, and night splint (not wearing).  PT was not helpful. X-rays 3/2/2017 show inferior spur without acute injury.  Denies trauma since xray.    Review of Systems   Constitution: Negative for chills, diaphoresis, fever, malaise/fatigue and night sweats.   Cardiovascular: Negative for claudication, cyanosis, leg swelling and syncope.   Skin: Negative for color change, dry skin, nail changes, rash, suspicious lesions and unusual hair distribution.   Musculoskeletal: Negative for falls, joint pain, joint swelling, muscle cramps, muscle weakness and stiffness.   Gastrointestinal: Negative for constipation, diarrhea, nausea and vomiting.   Neurological: Negative for brief paralysis, disturbances in coordination, focal weakness, numbness, paresthesias, sensory change and tremors.           Objective:      Physical Exam   Constitutional: She appears well-developed and well-nourished. She is cooperative. No distress.   Cardiovascular:   Pulses:       Popliteal pulses are 2+ on the right side, and 2+ on the left side.        Dorsalis pedis pulses are 2+ on the right side, and 2+ on the left side.        Posterior tibial pulses are 2+ on the right side, and 2+ on the left side.   Capillary refill 3 seconds all toes/distal feet, all toes/both feet warm to touch.      Negative lymphadenopathy bilateral popliteal fossa and tarsal tunnel.      Negavie lower extremity edema bilateral.     Musculoskeletal:   Sharp deep pain to palpation inferior heel left at medial calcaneal tubercle without ecchymosis, erythema, edema, or cardinal signs infection, and no signs of trauma.    Tenderness to palpation at the abductor muscle belly left foot.    Ankle dorsiflexion decreased at <5 degrees bilateral with moderate  increase with knee flexion bilateral.    Otherwise, Normal angle, base, station of gait. All ten toes without clubbing, cyanosis, or signs of ischemia.  No pain to palpation bilateral lower extremities.  Range of motion, stability, muscle strength, and muscle tone normal bilateral feet and legs.     Lymphadenopathy:   Negative lymphadenopathy bilateral popliteal fossa and tarsal tunnel.   Neurological: She is alert. She has normal strength. She displays no atrophy and no tremor. No sensory deficit. She exhibits normal muscle tone. She displays no seizure activity. Gait normal.   Reflex Scores:       Patellar reflexes are 2+ on the right side and 2+ on the left side.       Achilles reflexes are 2+ on the right side and 2+ on the left side.  Left foot: Positive Tinel signs to tibial nerve within the tarsal tunnel.  Right foot: Negative tinel sign to percussion sural, superficial peroneal, deep peroneal, saphenous, and posterior tibial nerves right and left ankles and feet.     Skin: Skin is warm, dry and intact. No abrasion, no bruising, no burn, no ecchymosis, no laceration, no lesion and no rash noted. She is not diaphoretic. No cyanosis or erythema. No pallor. Nails show no clubbing.     Skin is normal age and health appropriate color, turgor, texture, and temperature bilateral lower extremities without ulceration, hyperpigmentation, discoloration, masses nodules or cords palpated.  No ecchymosis, erythema, edema, or cardinal signs of infection bilateral lower extremities.               Assessment:       Encounter Diagnoses   Name Primary?    Plantar fasciitis Yes    Foot pain, left     Equinus contracture of ankle          Plan:       Joselin was seen today for foot pain.    Diagnoses and all orders for this visit:    Plantar fasciitis    Foot pain, left    Equinus contracture of ankle      I counseled the patient on her conditions, their implications and medical management.    Continue stretches, inserts,  athletic shoes, fx boot prn, nsaids prn (refilled).    Discussed surgical intervention with options of addressing only the plantar fasciitis with a release versus also including a tarsal tunnel release and TRAVIS with a much longer extended recovery to address the other concomitant  conditions. Her main concern is the plantar fascial pain, would like to move forward with isolated plantar fascial release.    Upon reviewing the risks/benefits,  the planned procedure, the surgical goal, and the postoperative course for the Lt. Foot plantar fascial release, the written consent was signed, timed, and dated by the patient with a witness present.      Rx  custom orthotics, PT.      Plan for surgery July 20    Ralph Snow DPM PGY-3

## 2017-07-14 NOTE — TELEPHONE ENCOUNTER
----- Message from Leeann Florentino sent at 7/14/2017  7:32 AM CDT -----  Pt has a foot surgery with Dr Guan / Thursday 07/20 ... Asking for appt for clearance ... Did schedule with Dr Ramiro Prakash / asking to speak to nurse for any availability with Dr Paul / call [phone] (home)

## 2017-07-18 ENCOUNTER — OFFICE VISIT (OUTPATIENT)
Dept: FAMILY MEDICINE | Facility: CLINIC | Age: 47
End: 2017-07-18
Payer: COMMERCIAL

## 2017-07-18 ENCOUNTER — DOCUMENTATION ONLY (OUTPATIENT)
Dept: FAMILY MEDICINE | Facility: CLINIC | Age: 47
End: 2017-07-18

## 2017-07-18 VITALS
BODY MASS INDEX: 31.88 KG/M2 | TEMPERATURE: 98 F | SYSTOLIC BLOOD PRESSURE: 113 MMHG | HEART RATE: 92 BPM | RESPIRATION RATE: 16 BRPM | HEIGHT: 64 IN | OXYGEN SATURATION: 99 % | WEIGHT: 186.75 LBS | DIASTOLIC BLOOD PRESSURE: 73 MMHG

## 2017-07-18 DIAGNOSIS — M72.2 PLANTAR FASCIITIS OF LEFT FOOT: ICD-10-CM

## 2017-07-18 DIAGNOSIS — N92.1 MENOMETRORRHAGIA: ICD-10-CM

## 2017-07-18 DIAGNOSIS — Z01.818 PREOPERATIVE CLEARANCE: Primary | ICD-10-CM

## 2017-07-18 PROCEDURE — 99213 OFFICE O/P EST LOW 20 MIN: CPT | Mod: S$GLB,,, | Performed by: INTERNAL MEDICINE

## 2017-07-18 NOTE — PROGRESS NOTES
"Subjective:       Patient ID: Joselin Phillips is a 46 y.o. female.    Chief Complaint: surgical clearance (foot)    HPI       C.C..  Surgical Clearance    Surgery:   Left plantar ligament spur removal.     Date of Surgery:     7.20.17 by Dr. Guan     Patient has  tolerated anesthesia without problems in the past. No hx of cad. No chest pain in last 6 mo. No hx of lung disease and is not a smoker. Has very heavy periods, no dizziness.     Functional status:  Good.     The patient has .  menometrorrhagia with periods being heavy and lasting about 10 days; this been going on for a year  Review of Systems   Constitutional: Negative for activity change and unexpected weight change.   HENT: Negative for hearing loss, rhinorrhea and trouble swallowing.    Eyes: Negative for discharge and visual disturbance.   Respiratory: Negative for chest tightness and wheezing.    Cardiovascular: Negative for chest pain and palpitations.   Gastrointestinal: Negative for blood in stool, constipation, diarrhea and vomiting.   Endocrine: Negative for polydipsia and polyuria.   Genitourinary: Positive for menstrual problem. Negative for difficulty urinating, dysuria and hematuria.   Musculoskeletal: Positive for arthralgias. Negative for joint swelling and neck pain.   Neurological: Positive for headaches. Negative for weakness.   Psychiatric/Behavioral: Negative for confusion and dysphoric mood.       Objective:      Vitals:    07/18/17 1555   BP: 113/73   Pulse: 92   Resp: 16   Temp: 98 °F (36.7 °C)   TempSrc: Oral   SpO2: 99%   Weight: 84.7 kg (186 lb 11.7 oz)   Height: 5' 4" (1.626 m)   PainSc: 0-No pain     Physical Exam   Constitutional: She appears well-developed and well-nourished.   Eyes: Pupils are equal, round, and reactive to light.   Cardiovascular: Normal rate, regular rhythm and normal heart sounds.    Pulmonary/Chest: Effort normal and breath sounds normal.   Abdominal: Soft. There is no tenderness.   Musculoskeletal: "   Tender over the left plantar heel   Neurological: She is alert.   Psychiatric: She has a normal mood and affect. Her behavior is normal. Thought content normal.   Nursing note and vitals reviewed.        Assessment:       1. Preoperative clearance    2. Plantar fasciitis of left foot    3. Menometrorrhagia          Plan:   The patient is medically cleared for surgery.  We'll check a CBC to make sure that there is not severe anemia.  Preoperative clearance  -     CBC auto differential; Future; Expected date: 07/18/2017    Plantar fasciitis of left foot    Menometrorrhagia  -     CBC auto differential; Future; Expected date: 07/18/2017      No Follow-up on file.

## 2017-07-18 NOTE — LETTER
July 18, 2017        No Recipients             [facility]  [address] [address] [address]  Phone: [phone]  Fax: [phone]   Patient: Joselin Phillips   MR Number: [id number]   YOB: 1970   Date of Visit: 7/18/2017     Dear Dr. Guan:    Joselin Phillips is medically cleared for surgery.    Sincerely,        Ramiro Prakash MD            CC  No Recipients    Enclosure

## 2017-07-19 ENCOUNTER — ANESTHESIA EVENT (OUTPATIENT)
Dept: SURGERY | Facility: HOSPITAL | Age: 47
End: 2017-07-19
Payer: COMMERCIAL

## 2017-07-19 DIAGNOSIS — M72.2 PLANTAR FASCIITIS: Primary | ICD-10-CM

## 2017-07-19 RX ORDER — LIDOCAINE HYDROCHLORIDE 10 MG/ML
1 INJECTION, SOLUTION EPIDURAL; INFILTRATION; INTRACAUDAL; PERINEURAL ONCE
Status: CANCELLED | OUTPATIENT
Start: 2017-07-19 | End: 2017-07-19

## 2017-07-20 ENCOUNTER — HOSPITAL ENCOUNTER (OUTPATIENT)
Facility: HOSPITAL | Age: 47
Discharge: HOME | End: 2017-07-20
Attending: PODIATRIST | Admitting: PODIATRIST
Payer: COMMERCIAL

## 2017-07-20 ENCOUNTER — ANESTHESIA (OUTPATIENT)
Dept: SURGERY | Facility: HOSPITAL | Age: 47
End: 2017-07-20
Payer: COMMERCIAL

## 2017-07-20 VITALS
DIASTOLIC BLOOD PRESSURE: 64 MMHG | TEMPERATURE: 98 F | HEIGHT: 63 IN | HEART RATE: 79 BPM | RESPIRATION RATE: 16 BRPM | WEIGHT: 187 LBS | SYSTOLIC BLOOD PRESSURE: 132 MMHG | OXYGEN SATURATION: 97 % | BODY MASS INDEX: 33.13 KG/M2

## 2017-07-20 DIAGNOSIS — M72.2 PLANTAR FASCIITIS: ICD-10-CM

## 2017-07-20 DIAGNOSIS — M79.672 LEFT FOOT PAIN: Primary | ICD-10-CM

## 2017-07-20 LAB
B-HCG UR QL: NEGATIVE
CTP QC/QA: YES

## 2017-07-20 PROCEDURE — 71000015 HC POSTOP RECOV 1ST HR: Performed by: PODIATRIST

## 2017-07-20 PROCEDURE — 63600175 PHARM REV CODE 636 W HCPCS: Performed by: NURSE ANESTHETIST, CERTIFIED REGISTERED

## 2017-07-20 PROCEDURE — D9220A PRA ANESTHESIA: Mod: CRNA,,, | Performed by: NURSE ANESTHETIST, CERTIFIED REGISTERED

## 2017-07-20 PROCEDURE — 71000033 HC RECOVERY, INTIAL HOUR: Performed by: PODIATRIST

## 2017-07-20 PROCEDURE — 37000009 HC ANESTHESIA EA ADD 15 MINS: Performed by: PODIATRIST

## 2017-07-20 PROCEDURE — 29893 ENDOSCOPIC PLANTR FASCIOTOMY: CPT | Mod: LT,,, | Performed by: PODIATRIST

## 2017-07-20 PROCEDURE — 25000003 PHARM REV CODE 250: Performed by: ANESTHESIOLOGY

## 2017-07-20 PROCEDURE — 36000707: Performed by: PODIATRIST

## 2017-07-20 PROCEDURE — 36000706: Performed by: PODIATRIST

## 2017-07-20 PROCEDURE — 25000003 PHARM REV CODE 250: Performed by: NURSE ANESTHETIST, CERTIFIED REGISTERED

## 2017-07-20 PROCEDURE — 81025 URINE PREGNANCY TEST: CPT | Performed by: PODIATRIST

## 2017-07-20 PROCEDURE — 27201423 OPTIME MED/SURG SUP & DEVICES STERILE SUPPLY: Performed by: PODIATRIST

## 2017-07-20 PROCEDURE — 27200651 HC AIRWAY, LMA: Performed by: NURSE ANESTHETIST, CERTIFIED REGISTERED

## 2017-07-20 PROCEDURE — 63600175 PHARM REV CODE 636 W HCPCS: Performed by: PODIATRIST

## 2017-07-20 PROCEDURE — D9220A PRA ANESTHESIA: Mod: ANES,,, | Performed by: ANESTHESIOLOGY

## 2017-07-20 PROCEDURE — 99900103 DSU ONLY-NO CHARGE-INITIAL HR (STAT): Performed by: PODIATRIST

## 2017-07-20 PROCEDURE — 71000039 HC RECOVERY, EACH ADD'L HOUR: Performed by: PODIATRIST

## 2017-07-20 PROCEDURE — 99900104 DSU ONLY-NO CHARGE-EA ADD'L HR (STAT): Performed by: PODIATRIST

## 2017-07-20 PROCEDURE — 25000003 PHARM REV CODE 250: Performed by: PODIATRIST

## 2017-07-20 PROCEDURE — 37000008 HC ANESTHESIA 1ST 15 MINUTES: Performed by: PODIATRIST

## 2017-07-20 RX ORDER — AMOXICILLIN AND CLAVULANATE POTASSIUM 500; 125 MG/1; MG/1
1 TABLET, FILM COATED ORAL 2 TIMES DAILY
Qty: 20 TABLET | Refills: 0 | Status: SHIPPED | OUTPATIENT
Start: 2017-07-20 | End: 2017-07-30

## 2017-07-20 RX ORDER — SODIUM CHLORIDE, SODIUM LACTATE, POTASSIUM CHLORIDE, CALCIUM CHLORIDE 600; 310; 30; 20 MG/100ML; MG/100ML; MG/100ML; MG/100ML
INJECTION, SOLUTION INTRAVENOUS CONTINUOUS
Status: DISCONTINUED | OUTPATIENT
Start: 2017-07-20 | End: 2017-07-20 | Stop reason: HOSPADM

## 2017-07-20 RX ORDER — LIDOCAINE HYDROCHLORIDE 10 MG/ML
INJECTION, SOLUTION EPIDURAL; INFILTRATION; INTRACAUDAL; PERINEURAL
Status: DISCONTINUED | OUTPATIENT
Start: 2017-07-20 | End: 2017-07-20 | Stop reason: HOSPADM

## 2017-07-20 RX ORDER — GLYCOPYRROLATE 0.2 MG/ML
INJECTION INTRAMUSCULAR; INTRAVENOUS
Status: DISCONTINUED | OUTPATIENT
Start: 2017-07-20 | End: 2017-07-20

## 2017-07-20 RX ORDER — DIPHENHYDRAMINE HYDROCHLORIDE 50 MG/ML
25 INJECTION INTRAMUSCULAR; INTRAVENOUS EVERY 6 HOURS PRN
Status: DISCONTINUED | OUTPATIENT
Start: 2017-07-20 | End: 2017-07-20 | Stop reason: HOSPADM

## 2017-07-20 RX ORDER — HYDROCODONE BITARTRATE AND ACETAMINOPHEN 5; 325 MG/1; MG/1
1 TABLET ORAL EVERY 4 HOURS PRN
Status: DISCONTINUED | OUTPATIENT
Start: 2017-07-20 | End: 2017-07-20 | Stop reason: HOSPADM

## 2017-07-20 RX ORDER — PHENYLEPHRINE HYDROCHLORIDE 10 MG/ML
INJECTION INTRAVENOUS
Status: DISCONTINUED | OUTPATIENT
Start: 2017-07-20 | End: 2017-07-20

## 2017-07-20 RX ORDER — HYDROMORPHONE HYDROCHLORIDE 2 MG/ML
0.2 INJECTION, SOLUTION INTRAMUSCULAR; INTRAVENOUS; SUBCUTANEOUS EVERY 5 MIN PRN
Status: DISCONTINUED | OUTPATIENT
Start: 2017-07-20 | End: 2017-07-20 | Stop reason: HOSPADM

## 2017-07-20 RX ORDER — FENTANYL CITRATE 50 UG/ML
25 INJECTION, SOLUTION INTRAMUSCULAR; INTRAVENOUS EVERY 5 MIN PRN
Status: DISCONTINUED | OUTPATIENT
Start: 2017-07-20 | End: 2017-07-20 | Stop reason: HOSPADM

## 2017-07-20 RX ORDER — ONDANSETRON 2 MG/ML
4 INJECTION INTRAMUSCULAR; INTRAVENOUS ONCE
Status: DISCONTINUED | OUTPATIENT
Start: 2017-07-20 | End: 2017-07-20 | Stop reason: HOSPADM

## 2017-07-20 RX ORDER — LIDOCAINE HCL/PF 100 MG/5ML
SYRINGE (ML) INTRAVENOUS
Status: DISCONTINUED | OUTPATIENT
Start: 2017-07-20 | End: 2017-07-20

## 2017-07-20 RX ORDER — OXYCODONE HYDROCHLORIDE 5 MG/1
5 TABLET ORAL ONCE
Status: COMPLETED | OUTPATIENT
Start: 2017-07-20 | End: 2017-07-20

## 2017-07-20 RX ORDER — MEPERIDINE HYDROCHLORIDE 25 MG/ML
12.5 INJECTION INTRAMUSCULAR; INTRAVENOUS; SUBCUTANEOUS ONCE AS NEEDED
Status: DISCONTINUED | OUTPATIENT
Start: 2017-07-20 | End: 2017-07-20 | Stop reason: HOSPADM

## 2017-07-20 RX ORDER — SODIUM CHLORIDE 0.9 % (FLUSH) 0.9 %
3 SYRINGE (ML) INJECTION
Status: DISCONTINUED | OUTPATIENT
Start: 2017-07-20 | End: 2017-07-20 | Stop reason: HOSPADM

## 2017-07-20 RX ORDER — ACETAMINOPHEN 500 MG
1000 TABLET ORAL EVERY 6 HOURS PRN
Status: ON HOLD | COMMUNITY
End: 2017-07-20 | Stop reason: HOSPADM

## 2017-07-20 RX ORDER — OXYCODONE HYDROCHLORIDE 5 MG/1
5 TABLET ORAL ONCE AS NEEDED
Status: COMPLETED | OUTPATIENT
Start: 2017-07-21 | End: 2017-07-20

## 2017-07-20 RX ORDER — LIDOCAINE HYDROCHLORIDE 10 MG/ML
1 INJECTION, SOLUTION EPIDURAL; INFILTRATION; INTRACAUDAL; PERINEURAL ONCE
Status: COMPLETED | OUTPATIENT
Start: 2017-07-20 | End: 2017-07-20

## 2017-07-20 RX ORDER — CEFAZOLIN SODIUM 2 G/50ML
2 SOLUTION INTRAVENOUS
Status: COMPLETED | OUTPATIENT
Start: 2017-07-20 | End: 2017-07-20

## 2017-07-20 RX ORDER — MIDAZOLAM HYDROCHLORIDE 1 MG/ML
INJECTION, SOLUTION INTRAMUSCULAR; INTRAVENOUS
Status: DISCONTINUED | OUTPATIENT
Start: 2017-07-20 | End: 2017-07-20

## 2017-07-20 RX ORDER — BUPIVACAINE HYDROCHLORIDE 5 MG/ML
INJECTION, SOLUTION EPIDURAL; INTRACAUDAL
Status: DISCONTINUED | OUTPATIENT
Start: 2017-07-20 | End: 2017-07-20 | Stop reason: HOSPADM

## 2017-07-20 RX ORDER — HYDROCODONE BITARTRATE AND ACETAMINOPHEN 7.5; 325 MG/1; MG/1
1 TABLET ORAL EVERY 6 HOURS PRN
Qty: 50 TABLET | Refills: 0 | Status: SHIPPED | OUTPATIENT
Start: 2017-07-20 | End: 2018-04-19 | Stop reason: ALTCHOICE

## 2017-07-20 RX ORDER — DEXAMETHASONE SODIUM PHOSPHATE 4 MG/ML
INJECTION, SOLUTION INTRA-ARTICULAR; INTRALESIONAL; INTRAMUSCULAR; INTRAVENOUS; SOFT TISSUE
Status: DISCONTINUED | OUTPATIENT
Start: 2017-07-20 | End: 2017-07-20

## 2017-07-20 RX ORDER — PROPOFOL 10 MG/ML
VIAL (ML) INTRAVENOUS
Status: DISCONTINUED | OUTPATIENT
Start: 2017-07-20 | End: 2017-07-20

## 2017-07-20 RX ORDER — ONDANSETRON 2 MG/ML
INJECTION INTRAMUSCULAR; INTRAVENOUS
Status: DISCONTINUED | OUTPATIENT
Start: 2017-07-20 | End: 2017-07-20

## 2017-07-20 RX ORDER — LIDOCAINE HYDROCHLORIDE 10 MG/ML
1 INJECTION, SOLUTION EPIDURAL; INFILTRATION; INTRACAUDAL; PERINEURAL ONCE
Status: DISCONTINUED | OUTPATIENT
Start: 2017-07-20 | End: 2017-07-20

## 2017-07-20 RX ORDER — FENTANYL CITRATE 50 UG/ML
INJECTION, SOLUTION INTRAMUSCULAR; INTRAVENOUS
Status: DISCONTINUED | OUTPATIENT
Start: 2017-07-20 | End: 2017-07-20

## 2017-07-20 RX ADMIN — FENTANYL CITRATE 100 MCG: 50 INJECTION INTRAMUSCULAR; INTRAVENOUS at 01:07

## 2017-07-20 RX ADMIN — DEXAMETHASONE SODIUM PHOSPHATE 8 MG: 4 INJECTION, SOLUTION INTRAMUSCULAR; INTRAVENOUS at 01:07

## 2017-07-20 RX ADMIN — SODIUM CHLORIDE, SODIUM LACTATE, POTASSIUM CHLORIDE, AND CALCIUM CHLORIDE: .6; .31; .03; .02 INJECTION, SOLUTION INTRAVENOUS at 01:07

## 2017-07-20 RX ADMIN — PHENYLEPHRINE HYDROCHLORIDE 80 MCG: 10 INJECTION INTRAVENOUS at 01:07

## 2017-07-20 RX ADMIN — OXYCODONE HYDROCHLORIDE 5 MG: 5 TABLET ORAL at 02:07

## 2017-07-20 RX ADMIN — MIDAZOLAM 2 MG: 1 INJECTION INTRAMUSCULAR; INTRAVENOUS at 12:07

## 2017-07-20 RX ADMIN — SODIUM CHLORIDE, SODIUM LACTATE, POTASSIUM CHLORIDE, AND CALCIUM CHLORIDE: .6; .31; .03; .02 INJECTION, SOLUTION INTRAVENOUS at 11:07

## 2017-07-20 RX ADMIN — ONDANSETRON 4 MG: 2 INJECTION, SOLUTION INTRAMUSCULAR; INTRAVENOUS at 01:07

## 2017-07-20 RX ADMIN — LIDOCAINE HYDROCHLORIDE 100 MG: 20 INJECTION, SOLUTION INTRAVENOUS at 01:07

## 2017-07-20 RX ADMIN — GLYCOPYRROLATE 0.2 MG: 0.2 INJECTION, SOLUTION INTRAMUSCULAR; INTRAVENOUS at 01:07

## 2017-07-20 RX ADMIN — PROPOFOL 200 MG: 10 INJECTION, EMULSION INTRAVENOUS at 01:07

## 2017-07-20 RX ADMIN — LIDOCAINE HYDROCHLORIDE: 10 INJECTION, SOLUTION EPIDURAL; INFILTRATION; INTRACAUDAL; PERINEURAL at 11:07

## 2017-07-20 RX ADMIN — PROPOFOL 50 MG: 10 INJECTION, EMULSION INTRAVENOUS at 01:07

## 2017-07-20 RX ADMIN — CEFAZOLIN SODIUM 2 G: 2 SOLUTION INTRAVENOUS at 12:07

## 2017-07-20 RX ADMIN — OXYCODONE HYDROCHLORIDE 5 MG: 5 TABLET ORAL at 03:07

## 2017-07-20 NOTE — OP NOTE
DATE OF PROCEDURE:  07/20/2017    PREOPERATIVE DIAGNOSES:  Recalcitrant plantar fasciitis, left foot and pain,   left foot.    POSTOPERATIVE DIAGNOSES:  Recalcitrant plantar fasciitis, left foot and pain,   left foot.    PROCEDURE:  Plantar fasciotomy, left foot.    SURGEON:  Amilcar Guan DPM.    ASSISTANT:  None.    ESTIMATED BLOOD LOSS:  Minimal, being less than 5 mL.    HEMOSTASIS:  Anatomic dissection and pneumatic ankle tourniquet, left.    PROCEDURE IN DETAIL:  Under mild sedation, the patient was brought in the   Operating Room and placed on the operating table in the supine position.  A   timeout was performed, all patient identifiers, site markings and procedures   were noted to be in agreement to all present.  Following placement of the LMA   and general anesthesia, local anesthesia was obtained the posterior tibial nerve   block of the left foot.  At this time, attention was directed to the medial and   lateral side of the patient's left foot where a small, less than 1 cm incision   was made in the medial aspect.  Dissection was carried down to the plantar   fascia with a curette and the special then was used to dissect the plane in the   inferior surface of the plantar aponeurosis.  Instrumentation was then   withdrawn.  Cannula placed across the plantar aspect and the dissection plane   just established.  The skin was tented on the lateral side of the left foot and   a small incision corresponding to the medial most band on the outside, allowing   the cannula and trocar passed through.  Trocar was removed and cannula was left   in place.  At this time, the endoscope was placed into the medial portal and the   plantar fascia was visualized, doubling completely as it was obscured by   copious amount of fatty tissue.  Cannula was cleared with several cotton tip   applicators and irrigated thoroughly.  Now visualization remained very   difficult.  Blunt probe was passed into the lateral portal and attempted  to   identify the medial most border of the plantar fascia.  This was not successful   as the adipose tissue in the plantar medial heel prevented good visualization.    At this time, all instrumentation was withdrawn.  The trocar was then replaced   and the trocar and cannula then removed as a unit.  At this time, the pneumatic   ankle tourniquet was inflated and an open procedure was under taken.  A   curvilinear incision was made in the medial plantar aspect of the patient's left   foot.  It was deepened into subcutaneous tissues.  The care being taken to   identify and retract all vascular structures.  All bleeders were ligated and   cauterized as necessary.  Medial band of plantar fascia was identified and its   border and dissection interval was then carried just deep to this, isolating the   plantar fascia from the plantar musculature of the foot.  At this time, the   medial and intermediate bands were released openly with good visualization of   the plantar musculature of the foot.  All bleeders were ligated and cauterized   as necessary.  The wound was copiously irrigated with sterile normal saline and   then subcutaneous tissues closed with 3-0 Vicryl and the skin closed with 3-0   nylon.  The wound was dressed with Adaptic and covered with a sterile   compressive dressing consisting of 4 x 4s, Kerlix and Ace bandage in foot and   ankle neutral position.  The pneumatic ankle tourniquet was deflated and a   prompt hyperemic response was noted to all digits of the patient's left foot.    This patient appeared to tolerate the procedure and anesthesia well.  She was   transferred to the Recovery Room with all vital signs stable and vascular status   intact to all toes of the left foot.  Following a period of postoperative   monitoring, the patient will be discharged home on the following written and   oral postoperative instructions:  1.  Keep the dressing dry and intact.  2.  Avoid excessive ambulation.  3.   Ice and elevate the left foot when at rest.  4.  Wear the surgical shoe at all times when ambulating.  5.  Contact Dr. Guan for all postoperative followup care and if any problems   should arise.  She will be discharged today with pain medication, Norco 7.5/325   take 1 tab p.o. q. 4-6 hours p.r.n. pain and Augmentin 500 mg take 1 tab p.o.   b.i.d.      KATHY  dd: 07/20/2017 14:09:19 (CDT)  td: 07/20/2017 15:20:46 (CDT)  Doc ID   #[id number]  Job ID #[id number]    CC:

## 2017-07-20 NOTE — TRANSFER OF CARE
"Anesthesia Transfer of Care Note    Patient: Joselin Phillips    Procedure(s) Performed: Procedure(s) (LRB):  ENDOSCOPIC PLANTAR FASCIOTOMY left (Left)    Patient location: PACU    Anesthesia Type: general    Transport from OR: Transported from OR on 2-3 L/min O2 by NC with adequate spontaneous ventilation    Post pain: adequate analgesia    Post assessment: no apparent anesthetic complications    Post vital signs: stable    Level of consciousness: awake    Nausea/Vomiting: no nausea/vomiting    Complications: none    Transfer of care protocol was followed      Last vitals:   Visit Vitals  /69 (BP Location: Left arm, Patient Position: Lying, BP Method: Automatic)   Pulse 89   Temp 36.4 °C (97.5 °F) (Other (see comments))   Resp 18   Ht 5' 3" (1.6 m)   Wt 84.8 kg (187 lb)   SpO2 100%   Breastfeeding? No   BMI 33.13 kg/m²     "

## 2017-07-20 NOTE — PLAN OF CARE
1605 Pt tolerated procedure well. Pt states no complaints. Neuro checks intact. Pt and family given discharge instructions with verbalized understanding. Pt escorted via w/c to car.

## 2017-07-20 NOTE — BRIEF OP NOTE
[facility]  Brief Operative Note     SUMMARY     Surgery Date: 7/20/2017     Surgeon(s) and Role:     * Amilcar Guan DPM - Primary    Assisting Surgeon: None    Pre-op Diagnosis:  Plantar fasciitis [M72.2]    Post-op Diagnosis:  Post-Op Diagnosis Codes:     * Plantar fasciitis [M72.2]    Procedure(s) (LRB):  ENDOSCOPIC PLANTAR FASCIOTOMY left (Left)    Anesthesia: Local MAC    Description of the findings of the procedure: open plantar fasciotomy    Findings/Key Components:  Very tight plantar fascia      Estimated Blood Loss: * No values recorded between 7/20/2017  1:18 PM and 7/20/2017  2:11 PM *         Specimens:   Specimen (12h ago through future)    None          Discharge Note    SUMMARY     Admit Date: 7/20/2017    Discharge Date and Time:  07/20/2017 2:15 PM    Hospital Course (synopsis of major diagnoses, care, treatment, and services provided during the course of the hospital stay): no complication     Final Diagnosis: Post-Op Diagnosis Codes:     * Plantar fasciitis [M72.2]    Disposition: Home or Self Care    Follow Up/Patient Instructions:     Medications:  Reconciled Home Medications:   Current Discharge Medication List      START taking these medications    Details   amoxicillin-clavulanate 500-125mg (AUGMENTIN) 500-125 mg Tab Take 1 tablet (500 mg total) by mouth 2 (two) times daily.  Qty: 20 tablet, Refills: 0      hydrocodone-acetaminophen 7.5-325mg (NORCO) 7.5-325 mg per tablet Take 1 tablet by mouth every 6 (six) hours as needed for Pain.  Qty: 50 tablet, Refills: 0         CONTINUE these medications which have NOT CHANGED    Details   meloxicam (MOBIC) 15 MG tablet Take 1 tablet (15 mg total) by mouth once daily.  Qty: 30 tablet, Refills: 0         STOP taking these medications       acetaminophen (TYLENOL) 500 MG tablet Comments:   Reason for Stopping:               Discharge Procedure Orders  Surgical Boot to Left Foot   Order Specific Question Answer Comments   Height:  "5' 3" (1.6 m)    Weight: 84.8 kg (187 lb)    Quantity: 1    Size: m    Length of need (1-99 months): 1      Diet general     Keep surgical extremity elevated     Ice to affected area     Activity as tolerated     Call MD for:  extreme fatigue     Call MD for:  persistent dizziness or light-headedness     Call MD for:  hives     Call MD for:  redness, tenderness, or signs of infection (pain, swelling, redness, odor or green/yellow discharge around incision site)     Call MD for:  difficulty breathing, headache or visual disturbances     Call MD for:  severe uncontrolled pain     Call MD for:  persistent nausea and vomiting     Call MD for:  temperature >100.4     Leave dressing on - Keep it clean, dry, and intact until clinic visit       Follow-up Information     Amilcar Guan DPM In 1 week.    Specialties:  Podiatry, Wound Care  Why:  For wound re-check  Contact information:  [address] [place] [address]  [phone]                 "

## 2017-07-20 NOTE — INTERVAL H&P NOTE
The patient has been examined and the H&P has been reviewed:    I concur with the findings and no changes have occurred since H&P was written.    Anesthesia/Surgery risks, benefits and alternative options discussed and understood by patient/family.          Active Hospital Problems    Diagnosis  POA    Plantar fasciitis [M72.2]  Yes      Resolved Hospital Problems    Diagnosis Date Resolved POA   No resolved problems to display.

## 2017-07-20 NOTE — INTERVAL H&P NOTE
The patient has been examined and the H&P has been reviewed:    I concur with the findings and no changes have occurred since H&P was written.  Left foot marked with purple skin marker as surgical site with agreement f rom doctor, patient, family, and nurse.    Anesthesia/Surgery risks, benefits and alternative options discussed and understood by patient/family.          Active Hospital Problems    Diagnosis  POA    Plantar fasciitis [M72.2]  Yes      Resolved Hospital Problems    Diagnosis Date Resolved POA   No resolved problems to display.

## 2017-07-20 NOTE — PLAN OF CARE
Admitted to DSU. Warm blankets and socks provided. Assessment complete. Scd wrap placed to right leg only. Pt with cheerful attitude. Family at bedside.

## 2017-07-20 NOTE — ANESTHESIA PREPROCEDURE EVALUATION
07/20/2017  Joselin Phillips is a 46 y.o., female.    Anesthesia Evaluation    I have reviewed the Patient Summary Reports.    I have reviewed the Nursing Notes.   I have reviewed the Medications.     Review of Systems  Anesthesia Hx:  No problems with previous Anesthesia Denies Hx of Anesthetic complications    Social:  Non-Smoker    Cardiovascular:   Denies Hypertension.  Denies MI.  Denies CAD.    Denies CABG/stent.   Denies Angina.    Pulmonary:   Denies COPD.  Denies Asthma.  Denies Recent URI.    Renal/:   Denies Chronic Renal Disease.     Hepatic/GI:   Denies GERD. Denies Liver Disease.    Neurological:   Denies TIA. Denies CVA. Denies Seizures.    Endocrine:   Denies Diabetes. Denies Hypothyroidism.    Psych:   Denies Psychiatric History.          Physical Exam  General:  Well nourished    Airway/Jaw/Neck:  Airway Findings: Mouth Opening: Normal Tongue: Normal  General Airway Assessment: Adult, Good  Mallampati: II  Improves to II with phonation.  TM Distance: 4-6 cm      Dental:  Dental Findings: In tact   Chest/Lungs:  Chest/Lungs Findings: Clear to auscultation, Normal Respiratory Rate     Heart/Vascular:  Heart Findings: Rate: Normal  Rhythm: Regular Rhythm  Sounds: Normal  Heart murmur: negative       Mental Status:  Mental Status Findings:  Cooperative, Alert and Oriented         Anesthesia Plan  Type of Anesthesia, risks & benefits discussed:  Anesthesia Type:  general  Patient's Preference:   Intra-op Monitoring Plan:   Intra-op Monitoring Plan Comments:   Post Op Pain Control Plan:   Post Op Pain Control Plan Comments:   Induction:   IV  Beta Blocker:  Patient is not currently on a Beta-Blocker (No further documentation required).       Informed Consent: Patient understands risks and agrees with Anesthesia plan.  Questions answered. Anesthesia consent signed with patient.  ASA Score: 2      Day of Surgery Review of History & Physical: I have interviewed and examined the patient. I have reviewed the patient's H&P dated:  There are no significant changes.          Ready For Surgery From Anesthesia Perspective.

## 2017-07-20 NOTE — ANESTHESIA POSTPROCEDURE EVALUATION
"Anesthesia Post Evaluation    Patient: Joselin Phillips    Procedure(s) Performed: Procedure(s) (LRB):  ENDOSCOPIC PLANTAR FASCIOTOMY left (Left)    Final Anesthesia Type: general  Patient location during evaluation: PACU  Patient participation: Yes- Able to Participate  Level of consciousness: awake and alert and oriented  Post-procedure vital signs: reviewed and stable  Pain management: adequate  Airway patency: patent  PONV status at discharge: No PONV  Anesthetic complications: no      Cardiovascular status: blood pressure returned to baseline  Respiratory status: unassisted, spontaneous ventilation and room air  Hydration status: euvolemic  Follow-up not needed.        Visit Vitals  /62   Pulse 90   Temp 36.2 °C (97.2 °F)   Resp (!) 24   Ht 5' 3" (1.6 m)   Wt 84.8 kg (187 lb)   SpO2 100%   Breastfeeding? No   BMI 33.13 kg/m²       Pain/Master Score: Pain Assessment Performed: Yes (7/20/2017  2:36 PM)  Presence of Pain: complains of pain/discomfort (7/20/2017  2:36 PM)  Pain Rating Prior to Med Admin: 7 (7/20/2017  2:36 PM)  Master Score: 8 (7/20/2017  2:30 PM)      "

## 2017-07-20 NOTE — DISCHARGE INSTRUCTIONS
General Information:    1.  Do not drink alcoholic beverages including beer for 24 hours or as long as you are on pain medication..  2.  Do not drive a motor vehicle, operate machinery or power tools, or signs legal papers for 24 hours or as long as you are on pain medication.   3.  You may experience light-headedness, dizziness, and sleepiness following surgery. Please do not stay alone. A responsible adult should be with you for this 24 hour period.  4.  Go home and rest.    5. Progress slowly to a normal diet unless instructed.  Otherwise, begin with liquids such as soft drinks, then soup and crackers working up to solid foods. Drink plenty of nonalcoholic fluids.  6.  Certain anesthetics and pain medications produce nausea and vomiting in certain       individuals. If nausea becomes a problem at home, call you doctor.    7. A nurse will be calling you sometime after surgery. Do not be alarmed. This is our way of finding out how you are doing.    8. Several times every hour while you are awake, take 2-3 deep breaths and cough. If you had stomach surgery hold a pillow or rolled towel firmly against your stomach before you cough. This will help with any pain the cough might cause.  9. Several times every hour while you are awake, pump and flex your feet 5-6 times and do foot circles. This will help prevent blood clots.    10.Call your doctor for severe pain, bleeding, fever, or signs or symptoms of infection (pain, swelling, redness, foul odor, drainage).    Discharge Instructions: After Your Surgery/Procedure  Youve just had surgery. During surgery you were given medicine called anesthesia to keep you relaxed and free of pain. After surgery you may have some pain or nausea. This is common. Here are some tips for feeling better and getting well after surgery.     Stay on schedule with your medication.   Going home  Your doctor or nurse will show you how to take care of yourself when you go home. He or she will  "also answer your questions. Have an adult family member or friend drive you home.      For your safety we recommend these precaution for the first 24 hours after your procedure:  · Do not drive or use heavy equipment.  · Do not make important decisions or sign legal papers.  · Do not drink alcohol.  · Have someone stay with you, if needed. He or she can watch for problems and help keep you safe.  · Your concentration, balance, coordination, and judgement may be impaired for many hours after anesthesia.  Use caution when ambulating or standing up.     · You may feel weak and "washed out" after anesthesia and surgery.      Subtle residual effects of general anesthesia or sedation with regional / local anesthesia can last more than 24 hours.  Rest for the remainder of the day or longer if your Doctor/Surgeon has advised you to do so.  Although you may feel normal within the first 24 hours, your reflexes and mental ability may be impaired without you realizing it.  You may feel dizzy, lightheaded or sleepy for 24 hours or longer.      Be sure to go to all follow-up visits with your doctor. And rest after your surgery for as long as your doctor tells you to.  Coping with pain  If you have pain after surgery, pain medicine will help you feel better. Take it as told, before pain becomes severe. Also, ask your doctor or pharmacist about other ways to control pain. This might be with heat, ice, or relaxation. And follow any other instructions your surgeon or nurse gives you.  Tips for taking pain medicine  To get the best relief possible, remember these points:  · Pain medicines can upset your stomach. Taking them with a little food may help.  · Most pain relievers taken by mouth need at least 20 to 30 minutes to start to work.  · Taking medicine on a schedule can help you remember to take it. Try to time your medicine so that you can take it before starting an activity. This might be before you get dressed, go for a walk, " or sit down for dinner.  · Constipation is a common side effect of pain medicines. Call your doctor before taking any medicines such as laxatives or stool softeners to help ease constipation. Also ask if you should skip any foods. Drinking lots of fluids and eating foods such as fruits and vegetables that are high in fiber can also help. Remember, do not take laxatives unless your surgeon has prescribed them.  · Drinking alcohol and taking pain medicine can cause dizziness and slow your breathing. It can even be deadly. Do not drink alcohol while taking pain medicine.  · Pain medicine can make you react more slowly to things. Do not drive or run machinery while taking pain medicine.  Your health care provider may tell you to take acetaminophen to help ease your pain. Ask him or her how much you are supposed to take each day. Acetaminophen or other pain relievers may interact with your prescription medicines or other over-the-counter (OTC) drugs. Some prescription medicines have acetaminophen and other ingredients. Using both prescription and OTC acetaminophen for pain can cause you to overdose. Read the labels on your OTC medicines with care. This will help you to clearly know the list of ingredients, how much to take, and any warnings. It may also help you not take too much acetaminophen. If you have questions or do not understand the information, ask your pharmacist or health care provider to explain it to you before you take the OTC medicine.  Managing nausea  Some people have an upset stomach after surgery. This is often because of anesthesia, pain, or pain medicine, or the stress of surgery. These tips will help you handle nausea and eat healthy foods as you get better. If you were on a special food plan before surgery, ask your doctor if you should follow it while you get better. These tips may help:  · Do not push yourself to eat. Your body will tell you when to eat and how much.  · Start off with clear  liquids and soup. They are easier to digest.  · Next try semi-solid foods, such as mashed potatoes, applesauce, and gelatin, as you feel ready.  · Slowly move to solid foods. Dont eat fatty, rich, or spicy foods at first.  · Do not force yourself to have 3 large meals a day. Instead eat smaller amounts more often.  · Take pain medicines with a small amount of solid food, such as crackers or toast, to avoid nausea.     Call your surgeon if  · You still have pain an hour after taking medicine. The medicine may not be strong enough.  · You feel too sleepy, dizzy, or groggy. The medicine may be too strong.  · You have side effects like nausea, vomiting, or skin changes, such as rash, itching, or hives.       If you have obstructive sleep apnea  You were given anesthesia medicine during surgery to keep you comfortable and free of pain. After surgery, you may have more apnea spells because of this medicine and other medicines you were given. The spells may last longer than usual.   At home:  · Keep using the continuous positive airway pressure (CPAP) device when you sleep. Unless your health care provider tells you not to, use it when you sleep, day or night. CPAP is a common device used to treat obstructive sleep apnea.  · Talk with your provider before taking any pain medicine, muscle relaxants, or sedatives. Your provider will tell you about the possible dangers of taking these medicines.  © 5055-9194 [employer]. [address] [place] [address]. All rights reserved. This information is not intended as a substitute for professional medical care. Always follow your healthcare professional's instructions.  Using Opioids for Pain Management     Your doctor has given instructions for you to take an opioid.  This is a drug for bad pain.  It helps control pain without causing bleeding and kidney problems.  Common opioid names are morphine, hydromorphone, oxycodone, and methadone. These drugs are  called narcotics.    There are several safety concerns you need to know.     · It is against the law to give or sell this drug to another person.  You must keep this medicine safely locked.    · You may have side effects from taking this medication.  These include nausea, itching, sweating, sleepiness, a change in your ability to breathe, and depression.  · Do not take alcohol or sleeping pills opioids.    · Long-term opoid use may no longer giver you relief from pain.  It can cause you stomach pain, mental anxiety, and headaches.  Long-term opoid use can potentially lead to unlawful street drug abuse and reduce your ability to stay employed.    · Your body may become opioid tolerant if you need to take more to get relief.    · You must stop taking opioids if you begin having more pain as a result of the medicine.    · Opioid withdrawal occurs when you have to stop taking the drug.  It can cause you to have nausea, vomiting, diarrhea, stomach pain, anxiety, and dilated pupils in your eyes. This condition means you are opioid dependent.    · Addiction is a drug induced brain disease. It means there are changes in how your brain is working.  Children, teens, and young adults under 25 years old are more likely to get addicted to opioids.      · Addiction can happen with repeated opioid use.  It does not happen with short-term use of two weeks or less.       For more information, please speak with your doctor or pharmacist.    Endoscopic Plantar Fasciotomy (EPF): After Surgery  Although you may feel fine when you are discharged, it is best to have someone drive you home. Your doctor may want you to rest and recover at home for a few days. Ask your doctor when you can start walking again. If a compression dressing is used to control swelling, you may need to wear a special surgical shoe. You may also need to wear a short leg brace for up to 3 weeks.  Recovering at home    Expect your foot to feel numb right after the  surgery. Then, as the local anesthesia wears off, youll probably feel some pain. To limit pain and swelling, ice the foot for 10 to 15 minutes several times a day. Also, raise the foot above heart level as often as you can. If you've been given pain medicines, take them as directed  Your first post-op visit  Your doctor may want to see you the first 1 to 2 weeks after surgery. During this post-op visit, your incisions will be checked to make sure they are healing. The compression dressing may be replaced with a smaller surgical dressing. If this occurs, you can probably start wearing tennis shoes.  When to call your healthcare provider   Call your healthcare provider if you have any of these:  · The dressing is too tight or there is marked swelling or numbness of the toes  · Pain despite taking medicines  · More than a few drops of blood at an incision site  · Signs of an infection, including fever, chills, and redness near an incision  · Skin discoloration beyond the dressing   Date Last Reviewed: 7/1/2016  © 0501-1619 [employer]. [address] [place] [address], [place] [address]. All rights reserved. This information is not intended as a substitute for professional medical care. Always follow your healthcare professional's instructions.

## 2017-07-20 NOTE — PLAN OF CARE
Pt stable, no deteriorations.  Continues to progress and is fully awake, alert, oriented x 4.  Pain medication given.  Surgical boot in place and ice pack behind left knee.  Pain level down to 4.  Pt taking po fluids well.  No reported signs and symptoms of gi distress.  IV site infusing without s/s infiltration.   Transferred to next phase of care.

## 2017-07-20 NOTE — DISCHARGE SUMMARY
"[facility]  Discharge Note  Short Stay    Admit Date: 7/20/2017    Discharge Date and Time: No discharge date for patient encounter.     Attending Physician: Amilcar Guan DPM     Discharge Provider: Amilcar Guan    Diagnoses:  Active Hospital Problems    Diagnosis  POA    *Plantar fasciitis [M72.2]  Yes      Resolved Hospital Problems    Diagnosis Date Resolved POA   No resolved problems to display.       Discharged Condition: good    Hospital Course: Patient was admitted for an outpatient procedure and tolerated the procedure well with no complications.    Final Diagnoses: Same as principal problem.    Disposition: Home or Self Care    Follow up/Patient Instructions:    Medications:  Reconciled Home Medications:   Current Discharge Medication List      START taking these medications    Details   amoxicillin-clavulanate 500-125mg (AUGMENTIN) 500-125 mg Tab Take 1 tablet (500 mg total) by mouth 2 (two) times daily.  Qty: 20 tablet, Refills: 0      hydrocodone-acetaminophen 7.5-325mg (NORCO) 7.5-325 mg per tablet Take 1 tablet by mouth every 6 (six) hours as needed for Pain.  Qty: 50 tablet, Refills: 0         CONTINUE these medications which have NOT CHANGED    Details   meloxicam (MOBIC) 15 MG tablet Take 1 tablet (15 mg total) by mouth once daily.  Qty: 30 tablet, Refills: 0         STOP taking these medications       acetaminophen (TYLENOL) 500 MG tablet Comments:   Reason for Stopping:               Discharge Procedure Orders  Surgical Boot to Left Foot   Order Specific Question Answer Comments   Height: 5' 3" (1.6 m)    Weight: 84.8 kg (187 lb)    Quantity: 1    Size: m    Length of need (1-99 months): 1      Diet general     Keep surgical extremity elevated     Ice to affected area     Activity as tolerated     Call MD for:  extreme fatigue     Call MD for:  persistent dizziness or light-headedness     Call MD for:  hives     Call MD for:  redness, tenderness, or signs of infection (pain, " "swelling, redness, odor or green/yellow discharge around incision site)     Call MD for:  difficulty breathing, headache or visual disturbances     Call MD for:  severe uncontrolled pain     Call MD for:  persistent nausea and vomiting     Call MD for:  temperature >100.4     Leave dressing on - Keep it clean, dry, and intact until clinic visit       Follow-up Information     Amilcar Guan DPM In 1 week.    Specialties:  Podiatry, Wound Care  Why:  For wound re-check  Contact information:  [address] [facility] [facility] [address]  [phone]                   Discharge Procedure Orders (must include Diet, Follow-up, Activity):    Discharge Procedure Orders (must include Diet, Follow-up, Activity)  Surgical Boot to Left Foot   Order Specific Question Answer Comments   Height: 5' 3" (1.6 m)    Weight: 84.8 kg (187 lb)    Quantity: 1    Size: m    Length of need (1-99 months): 1      Diet general     Keep surgical extremity elevated     Ice to affected area     Activity as tolerated     Call MD for:  extreme fatigue     Call MD for:  persistent dizziness or light-headedness     Call MD for:  hives     Call MD for:  redness, tenderness, or signs of infection (pain, swelling, redness, odor or green/yellow discharge around incision site)     Call MD for:  difficulty breathing, headache or visual disturbances     Call MD for:  severe uncontrolled pain     Call MD for:  persistent nausea and vomiting     Call MD for:  temperature >100.4     Leave dressing on - Keep it clean, dry, and intact until clinic visit        "

## 2017-07-21 ENCOUNTER — TELEPHONE (OUTPATIENT)
Dept: PODIATRY | Facility: CLINIC | Age: 47
End: 2017-07-21

## 2017-07-21 NOTE — TELEPHONE ENCOUNTER
Patient has questions about when she is to return to work.  Informed her that Dr Guan is out of clinic until Monday.

## 2017-07-21 NOTE — TELEPHONE ENCOUNTER
----- Message from Lily Chavez sent at 7/21/2017 12:55 PM CDT -----  Patient states that she has questions to ask in reference to her previous procedure.  Call [phone].

## 2017-07-24 NOTE — TELEPHONE ENCOUNTER
Pt advised that will be address at post op appt 7/27/*2017. Pt confirmed date/ time/location/provider.

## 2017-07-24 NOTE — ADDENDUM NOTE
Encounter addended by: Sirena Shine LPN on: 7/24/2017  9:08 AM<BR>    Actions taken: Letter status changed

## 2017-07-27 ENCOUNTER — OFFICE VISIT (OUTPATIENT)
Dept: PODIATRY | Facility: CLINIC | Age: 47
End: 2017-07-27
Payer: COMMERCIAL

## 2017-07-27 VITALS — BODY MASS INDEX: 32.66 KG/M2 | WEIGHT: 184.31 LBS | HEIGHT: 63 IN

## 2017-07-27 DIAGNOSIS — Z98.890 POST-OPERATIVE STATE: Primary | ICD-10-CM

## 2017-07-27 PROCEDURE — 99999 PR PBB SHADOW E&M-EST. PATIENT-LVL III: CPT | Mod: PBBFAC,,, | Performed by: PODIATRIST

## 2017-07-27 PROCEDURE — 99024 POSTOP FOLLOW-UP VISIT: CPT | Mod: S$GLB,,, | Performed by: PODIATRIST

## 2017-07-27 RX ORDER — MELOXICAM 15 MG/1
15 TABLET ORAL DAILY
Qty: 30 TABLET | Refills: 0 | Status: SHIPPED | OUTPATIENT
Start: 2017-07-27 | End: 2017-08-31 | Stop reason: SDUPTHER

## 2017-07-27 NOTE — PROGRESS NOTES
Subjective:      Patient ID: Joselin Phillips is a 46 y.o. female.    Chief Complaint: Post-op Evaluation (Left foot)    1 week s/p open release PF left.  Not ambulating in sx shoe.  norco helps pain, makes sleepy.  Not taking meloxicam.  Denies signs infection, taking augmentin.    Review of Systems   Constitution: Negative for chills, diaphoresis, fever, weakness, malaise/fatigue and night sweats.           Objective:      Physical Exam   Constitutional: She appears well-developed and well-nourished. She is cooperative. No distress.   Cardiovascular:   Pulses:       Popliteal pulses are 2+ on the right side, and 2+ on the left side.        Dorsalis pedis pulses are 2+ on the right side, and 2+ on the left side.        Posterior tibial pulses are 2+ on the right side, and 2+ on the left side.   Capillary refill 3 seconds all toes/distal feet, all toes/both feet warm to touch.      Negative lymphadenopathy bilateral popliteal fossa and tarsal tunnel.      Negavie lower extremity edema bilateral.     Musculoskeletal:        Right ankle: Normal. She exhibits normal range of motion, no swelling, no ecchymosis, no deformity, no laceration and normal pulse. Achilles tendon normal. Achilles tendon exhibits no pain, no defect and normal Rodríguez's test results.   Normal post op pain to palpation arch/heel left foot.    Otherwise, Normal angle, base, station of gait. All ten toes without clubbing, cyanosis, or signs of ischemia.  No pain to palpation bilateral lower extremities.  Range of motion, stability, muscle strength, and muscle tone normal bilateral feet and legs.     Lymphadenopathy:   Negative lymphadenopathy bilateral popliteal fossa and tarsal tunnel.   Neurological: She is alert. She has normal strength. She displays no atrophy and no tremor. No sensory deficit. She exhibits normal muscle tone. She displays no seizure activity. Gait normal.   Reflex Scores:       Patellar reflexes are 2+ on the right side  and 2+ on the left side.       Achilles reflexes are 2+ on the right side and 2+ on the left side.  Negative allodynia left and right foot.    Sensation intact.    Voluntary motion/strength normal both feet/ankles.   Skin: Skin is warm, dry and intact. No abrasion, no bruising, no burn, no ecchymosis, no laceration, no lesion and no rash noted. She is not diaphoretic. No cyanosis or erythema. No pallor. Nails show no clubbing.   Incision  Plantar left arch surgical foot well approximated, good skin apposition, sutures intact without gaps, drainage, pus, tracking, fluctuance, malodor, or signs of infection.                 Assessment:       Encounter Diagnosis   Name Primary?    Post-operative state Yes         Plan:       Joselin was seen today for post-op evaluation.    Diagnoses and all orders for this visit:    Post-operative state  -     Ambulatory consult to Physical Therapy    Other orders  -     meloxicam (MOBIC) 15 MG tablet; Take 1 tablet (15 mg total) by mouth once daily.      I counseled the patient on her conditions, their implications and medical management.        Ambulate in boot to tolerance.    Stressed the importance of motion and early ambulation.   Rx PT.  Pt verbalized understanding of importance to recovery.    Rx mobic.          No Follow-up on file.

## 2017-08-01 ENCOUNTER — TELEPHONE (OUTPATIENT)
Dept: REHABILITATION | Facility: HOSPITAL | Age: 47
End: 2017-08-01

## 2017-08-03 ENCOUNTER — OFFICE VISIT (OUTPATIENT)
Dept: PODIATRY | Facility: CLINIC | Age: 47
End: 2017-08-03
Payer: COMMERCIAL

## 2017-08-03 VITALS — HEIGHT: 63 IN | BODY MASS INDEX: 33.12 KG/M2 | WEIGHT: 186.94 LBS

## 2017-08-03 DIAGNOSIS — Z98.890 POST-OPERATIVE STATE: Primary | ICD-10-CM

## 2017-08-03 PROCEDURE — 99999 PR PBB SHADOW E&M-EST. PATIENT-LVL III: CPT | Mod: PBBFAC,,, | Performed by: PODIATRIST

## 2017-08-03 PROCEDURE — 99024 POSTOP FOLLOW-UP VISIT: CPT | Mod: S$GLB,,, | Performed by: PODIATRIST

## 2017-08-03 NOTE — PROGRESS NOTES
Subjective:      Patient ID: Joselin Phillips is a 46 y.o. female.    Chief Complaint: Post-op Evaluation (week 2)    2 week s/p open release PF left.  Not ambulating in sx shoe.  norco helps pain, makes sleepy.  Not taking meloxicam.  Denies signs infection, taking augmentin.    Review of Systems   Constitution: Negative for chills, diaphoresis, fever, weakness, malaise/fatigue and night sweats.           Objective:      Physical Exam   Constitutional: She appears well-developed and well-nourished. She is cooperative. No distress.   Cardiovascular:   Pulses:       Popliteal pulses are 2+ on the right side, and 2+ on the left side.        Dorsalis pedis pulses are 2+ on the right side, and 2+ on the left side.        Posterior tibial pulses are 2+ on the right side, and 2+ on the left side.   Capillary refill 3 seconds all toes/distal feet, all toes/both feet warm to touch.      Negative lymphadenopathy bilateral popliteal fossa and tarsal tunnel.      Negavie lower extremity edema bilateral.     Musculoskeletal:        Right ankle: Normal. She exhibits normal range of motion, no swelling, no ecchymosis, no deformity, no laceration and normal pulse. Achilles tendon normal. Achilles tendon exhibits no pain, no defect and normal Rodríguez's test results.   Normal post op pain to palpation arch/heel left foot.    Otherwise, Normal angle, base, station of gait. All ten toes without clubbing, cyanosis, or signs of ischemia.  No pain to palpation bilateral lower extremities.  Range of motion, stability, muscle strength, and muscle tone normal bilateral feet and legs.     Lymphadenopathy:   Negative lymphadenopathy bilateral popliteal fossa and tarsal tunnel.   Neurological: She is alert. She has normal strength. She displays no atrophy and no tremor. No sensory deficit. She exhibits normal muscle tone. She displays no seizure activity. Gait normal.   Reflex Scores:       Patellar reflexes are 2+ on the right side and  2+ on the left side.       Achilles reflexes are 2+ on the right side and 2+ on the left side.  Negative allodynia left and right foot.    Sensation intact.    Voluntary motion/strength normal both feet/ankles.   Skin: Skin is warm, dry and intact. No abrasion, no bruising, no burn, no ecchymosis, no laceration, no lesion and no rash noted. She is not diaphoretic. No cyanosis or erythema. No pallor. Nails show no clubbing.   Incision  Plantar left arch surgical foot well approximated, good skin apposition, sutures intact without gaps, drainage, pus, tracking, fluctuance, malodor, or signs of infection.                 Assessment:       Encounter Diagnosis   Name Primary?    Post-operative state Yes         Plan:       Joselin was seen today for post-op evaluation.    Diagnoses and all orders for this visit:    Post-operative state      I counseled the patient on her conditions, their implications and medical management.    Ambulate in boot to tolerance - return to work same - letter written.    Stressed the importance of motion and early ambulation. Pt verbalized understanding of importance to recovery.    Continue prn mobic.          Return in about 1 week (around 8/10/2017) for suture.

## 2017-08-10 ENCOUNTER — OFFICE VISIT (OUTPATIENT)
Dept: PODIATRY | Facility: CLINIC | Age: 47
End: 2017-08-10
Payer: COMMERCIAL

## 2017-08-10 VITALS — BODY MASS INDEX: 33.01 KG/M2 | HEIGHT: 63 IN | WEIGHT: 186.31 LBS

## 2017-08-10 DIAGNOSIS — Z98.890 POST-OPERATIVE STATE: Primary | ICD-10-CM

## 2017-08-10 PROCEDURE — 99999 PR PBB SHADOW E&M-EST. PATIENT-LVL III: CPT | Mod: PBBFAC,,, | Performed by: PODIATRIST

## 2017-08-10 PROCEDURE — 99024 POSTOP FOLLOW-UP VISIT: CPT | Mod: S$GLB,,, | Performed by: PODIATRIST

## 2017-08-10 NOTE — PROGRESS NOTES
Subjective:      Patient ID: Joselin Phillips is a 46 y.o. female.    Chief Complaint: Post-op Evaluation    3 week s/p open release PF left.  ambulating in sx boot left.  Minimal pain - no current meds needed.  Not taking meloxicam.      Review of Systems   Constitution: Negative for chills, diaphoresis, fever, weakness, malaise/fatigue and night sweats.           Objective:      Physical Exam   Constitutional: She appears well-developed and well-nourished. She is cooperative. No distress.   Cardiovascular:   Pulses:       Popliteal pulses are 2+ on the right side, and 2+ on the left side.        Dorsalis pedis pulses are 2+ on the right side, and 2+ on the left side.        Posterior tibial pulses are 2+ on the right side, and 2+ on the left side.   Capillary refill 3 seconds all toes/distal feet, all toes/both feet warm to touch.      Negative lymphadenopathy bilateral popliteal fossa and tarsal tunnel.      Negavie lower extremity edema bilateral.     Musculoskeletal:        Right ankle: Normal. She exhibits normal range of motion, no swelling, no ecchymosis, no deformity, no laceration and normal pulse. Achilles tendon normal. Achilles tendon exhibits no pain, no defect and normal Rodríguez's test results.   Normal post op pain to palpation arch/heel left foot.    Otherwise, Normal angle, base, station of gait. All ten toes without clubbing, cyanosis, or signs of ischemia.  No pain to palpation bilateral lower extremities.  Range of motion, stability, muscle strength, and muscle tone normal bilateral feet and legs.     Lymphadenopathy:   Negative lymphadenopathy bilateral popliteal fossa and tarsal tunnel.   Neurological: She is alert. She has normal strength. She displays no atrophy and no tremor. No sensory deficit. She exhibits normal muscle tone. She displays no seizure activity. Gait normal.   Reflex Scores:       Patellar reflexes are 2+ on the right side and 2+ on the left side.       Achilles  reflexes are 2+ on the right side and 2+ on the left side.  Negative allodynia left and right foot.    Sensation intact.    Voluntary motion/strength normal both feet/ankles.   Skin: Skin is warm, dry and intact. No abrasion, no bruising, no burn, no ecchymosis, no laceration, no lesion and no rash noted. She is not diaphoretic. No cyanosis or erythema. No pallor. Nails show no clubbing.   Incision  Plantar left arch surgical foot well approximated, good skin apposition, sutures intact without gaps, drainage, pus, tracking, fluctuance, malodor, or signs of infection.                 Assessment:       Encounter Diagnosis   Name Primary?    Post-operative state Yes         Plan:       Joselin was seen today for post-op evaluation.    Diagnoses and all orders for this visit:    Post-operative state      I counseled the patient on her conditions, their implications and medical management.    Removed sutures left foot without event.    Ambulate in boot or shoes to tolerance - return to work same - letter written.    Stressed the importance of motion and early ambulation. Pt verbalized understanding of importance to recovery.    Continue prn mobic.          Return in about 3 weeks (around 8/31/2017).

## 2017-08-15 ENCOUNTER — TELEPHONE (OUTPATIENT)
Dept: PODIATRY | Facility: CLINIC | Age: 47
End: 2017-08-15

## 2017-08-15 NOTE — TELEPHONE ENCOUNTER
Spoke with Mrs Olivera she stated that her incision opened up a little. She did apply triple antibiotic and cover the incision with a band aide. She is also having some pain in her foot after resting when she goes to walk on it. Appointment scheduled for 08/16/2017 at 10:00 am, to evaluate incision and pain.

## 2017-08-15 NOTE — TELEPHONE ENCOUNTER
----- Message from Yobany Moura sent at 8/15/2017  8:54 AM CDT -----  Contact: same  Patient called in and requested a message be sent over regarding her foot surgery and patient has a couple of questions regarding her incision site.  Patient also stated it is still hard to work.    Patient call back number is [phone]

## 2017-08-16 ENCOUNTER — OFFICE VISIT (OUTPATIENT)
Dept: PODIATRY | Facility: CLINIC | Age: 47
End: 2017-08-16
Payer: COMMERCIAL

## 2017-08-16 VITALS — BODY MASS INDEX: 32.69 KG/M2 | HEIGHT: 63 IN | WEIGHT: 184.5 LBS

## 2017-08-16 DIAGNOSIS — Z98.890 POST-OPERATIVE STATE: Primary | ICD-10-CM

## 2017-08-16 PROCEDURE — 99999 PR PBB SHADOW E&M-EST. PATIENT-LVL III: CPT | Mod: PBBFAC,,, | Performed by: PODIATRIST

## 2017-08-16 PROCEDURE — 99024 POSTOP FOLLOW-UP VISIT: CPT | Mod: S$GLB,,, | Performed by: PODIATRIST

## 2017-08-16 NOTE — PROGRESS NOTES
Subjective:      Patient ID: Joselin Phillips is a 46 y.o. female.    Chief Complaint: Foot Pain (pain after resting )    Open skin with foot pain wb after rest left.  S/p about 5 weeks open plantar fasciotomy.  Ambulating in slides.    Review of Systems   Constitution: Negative for chills, diaphoresis, fever, weakness, malaise/fatigue and night sweats.   Skin: Positive for suspicious lesions.           Objective:      Physical Exam   Constitutional: She appears well-developed and well-nourished. She is cooperative. No distress.   Cardiovascular:   Pulses:       Popliteal pulses are 2+ on the right side, and 2+ on the left side.        Dorsalis pedis pulses are 2+ on the right side, and 2+ on the left side.        Posterior tibial pulses are 2+ on the right side, and 2+ on the left side.   Capillary refill 3 seconds all toes/distal feet, all toes/both feet warm to touch.      Negative lymphadenopathy bilateral popliteal fossa and tarsal tunnel.      Negavie lower extremity edema bilateral.     Musculoskeletal:        Right ankle: Normal. She exhibits normal range of motion, no swelling, no ecchymosis, no deformity, no laceration and normal pulse. Achilles tendon normal. Achilles tendon exhibits no pain, no defect and normal Rodríguez's test results.   Mild pain to palpation inferior lft foot over incision line in area of surgery.   Lymphadenopathy:   Negative lymphadenopathy bilateral popliteal fossa and tarsal tunnel.   Neurological: She is alert. She has normal strength. She displays no atrophy and no tremor. No sensory deficit. She exhibits normal muscle tone. She displays no seizure activity. Gait normal.   Reflex Scores:       Patellar reflexes are 2+ on the right side and 2+ on the left side.       Achilles reflexes are 2+ on the right side and 2+ on the left side.  Negative tinel sign to percussion sural, superficial peroneal, deep peroneal, saphenous, and posterior tibial nerves right and left ankles and  feet.    Negative allodynia.   Skin: Skin is warm, dry and intact. No abrasion, no bruising, no burn, no ecchymosis, no laceration, no lesion and no rash noted. She is not diaphoretic. No cyanosis or erythema. No pallor. Nails show no clubbing.   Scar plantar medial left arch closed with mild sensitivity and  without ulceration, drainage, pus, tracking, fluctuance, malodor, or cardinal signs infection.     Otherwise, Skin is normal age and health appropriate color, turgor, texture, and temperature bilateral lower extremities without ulceration, hyperpigmentation, discoloration, masses nodules or cords palpated.  No ecchymosis, erythema, edema, or cardinal signs of infection bilateral lower extremities.                 Assessment:       Encounter Diagnosis   Name Primary?    Post-operative state Yes         Plan:       Joselin was seen today for foot pain.    Diagnoses and all orders for this visit:    Post-operative state      I counseled the patient on her conditions, their implications and medical management.    Removed caitie incisional hyperkeratosis without event or open skin.    Cushion with arch pad and gradually return to ambulation in shoes of choice as tolerated.    Keep scheduled follow up 2 weeks.  Sooner prn.          Return in about 2 weeks (around 8/30/2017) for post op.

## 2017-08-31 ENCOUNTER — OFFICE VISIT (OUTPATIENT)
Dept: PODIATRY | Facility: CLINIC | Age: 47
End: 2017-08-31
Payer: COMMERCIAL

## 2017-08-31 VITALS — HEIGHT: 63 IN | BODY MASS INDEX: 32.6 KG/M2 | WEIGHT: 184 LBS

## 2017-08-31 DIAGNOSIS — M79.675 CHRONIC TOE PAIN, LEFT FOOT: Primary | ICD-10-CM

## 2017-08-31 DIAGNOSIS — M24.573 EQUINUS CONTRACTURE OF ANKLE: ICD-10-CM

## 2017-08-31 DIAGNOSIS — G89.29 CHRONIC TOE PAIN, LEFT FOOT: Primary | ICD-10-CM

## 2017-08-31 DIAGNOSIS — Z98.890 POST-OPERATIVE STATE: ICD-10-CM

## 2017-08-31 PROCEDURE — 99999 PR PBB SHADOW E&M-EST. PATIENT-LVL III: CPT | Mod: PBBFAC,,, | Performed by: PODIATRIST

## 2017-08-31 PROCEDURE — 99024 POSTOP FOLLOW-UP VISIT: CPT | Mod: S$GLB,,, | Performed by: PODIATRIST

## 2017-08-31 RX ORDER — MELOXICAM 15 MG/1
15 TABLET ORAL DAILY
Qty: 30 TABLET | Refills: 0 | Status: SHIPPED | OUTPATIENT
Start: 2017-08-31 | End: 2017-09-23 | Stop reason: SDUPTHER

## 2017-08-31 NOTE — PROGRESS NOTES
Subjective:      Patient ID: Joselin Phillips is a 46 y.o. female.    Chief Complaint: Post-op Evaluation (6 weeks)     foot pain wb after rest left.  S/p about 7 weeks open plantar fasciotomy left.  Ambulating in slides.  Relates signinicant residual pain on rising from rest.  Denies trauma since surgery.  meloxicam helps.    Review of Systems   Constitution: Negative for chills, diaphoresis, fever, weakness, malaise/fatigue and night sweats.   Skin: Positive for suspicious lesions.           Objective:      Physical Exam   Constitutional: She appears well-developed and well-nourished. She is cooperative. No distress.   Cardiovascular:   Pulses:       Popliteal pulses are 2+ on the right side, and 2+ on the left side.        Dorsalis pedis pulses are 2+ on the right side, and 2+ on the left side.        Posterior tibial pulses are 2+ on the right side, and 2+ on the left side.   Capillary refill 3 seconds all toes/distal feet, all toes/both feet warm to touch.      Negative lymphadenopathy bilateral popliteal fossa and tarsal tunnel.      Negavie lower extremity edema bilateral.     Musculoskeletal:        Right ankle: Normal. She exhibits normal range of motion, no swelling, no ecchymosis, no deformity, no laceration and normal pulse. Achilles tendon normal. Achilles tendon exhibits no pain, no defect and normal Rodríguez's test results.   Moderate pain to palpation inferior lft foot over incision line in area of surgery.    Ankle dorsiflexion decreased at <10 degrees bilateral with moderate increase with knee flexion bilateral.    Otherwise, Normal angle, base, station of gait. All ten toes without clubbing, cyanosis, or signs of ischemia.  No pain to palpation bilateral lower extremities.  Range of motion, stability, muscle strength, and muscle tone normal bilateral feet and legs.     Lymphadenopathy:   Negative lymphadenopathy bilateral popliteal fossa and tarsal tunnel.   Neurological: She is alert. She has  normal strength. She displays no atrophy and no tremor. No sensory deficit. She exhibits normal muscle tone. She displays no seizure activity. Gait normal.   Reflex Scores:       Patellar reflexes are 2+ on the right side and 2+ on the left side.       Achilles reflexes are 2+ on the right side and 2+ on the left side.  Negative tinel sign to percussion sural, superficial peroneal, deep peroneal, saphenous, and posterior tibial nerves right and left ankles and feet.    Negative allodynia both feet/ankles.   Skin: Skin is warm, dry and intact. No abrasion, no bruising, no burn, no ecchymosis, no laceration, no lesion and no rash noted. She is not diaphoretic. No cyanosis or erythema. No pallor. Nails show no clubbing.   Scar plantar medial left arch closed with sensitivity to pressure and  without ulceration, drainage, pus, tracking, fluctuance, malodor, or cardinal signs infection.     Otherwise, Skin is normal age and health appropriate color, turgor, texture, and temperature bilateral lower extremities without ulceration, hyperpigmentation, discoloration, masses nodules or cords palpated.  No ecchymosis, erythema, edema, or cardinal signs of infection bilateral lower extremities.                 Assessment:       Encounter Diagnoses   Name Primary?    Chronic toe pain, left foot Yes    Equinus contracture of ankle     Post-operative state          Plan:       Joselin was seen today for post-op evaluation.    Diagnoses and all orders for this visit:    Chronic toe pain, left foot  -     Ambulatory consult to Physical Therapy  -     ORTHOTIC DEVICE (DME)    Equinus contracture of ankle  -     Ambulatory consult to Physical Therapy  -     ORTHOTIC DEVICE (DME)    Post-operative state  -     Ambulatory consult to Physical Therapy  -     ORTHOTIC DEVICE (DME)    Other orders  -     meloxicam (MOBIC) 15 MG tablet; Take 1 tablet (15 mg total) by mouth once daily.      I counseled the patient on her conditions,  their implications and medical management.    RX PT, custom orthotics.    Refill meloxicam.  The patient was advised that NSAID-type medications have two very important potential side effects: gastrointestinal irritation including hemorrhage and renal injuries. She was asked to take the medication with food and to stop if she experiences any GI upset. I asked her to call for vomiting, abdominal pain or black/bloody stools. The patient expresses understanding of these issues and questions were answered.    Discussed conservative treatment with shoes of adequate dimensions, material, and style to alleviate symptoms and delay or prevent surgical intervention.    Discussed possible TRAVIS in future if ineffective relief from PF release alone.    Continue night splint.          Return in about 1 month (around 9/30/2017).

## 2017-09-13 ENCOUNTER — TELEPHONE (OUTPATIENT)
Dept: REHABILITATION | Facility: HOSPITAL | Age: 47
End: 2017-09-13

## 2017-09-19 ENCOUNTER — CLINICAL SUPPORT (OUTPATIENT)
Dept: REHABILITATION | Facility: HOSPITAL | Age: 47
End: 2017-09-19
Attending: PODIATRIST
Payer: COMMERCIAL

## 2017-09-19 DIAGNOSIS — G89.29 TOE PAIN, CHRONIC, LEFT: Primary | ICD-10-CM

## 2017-09-19 DIAGNOSIS — M79.675 TOE PAIN, CHRONIC, LEFT: Primary | ICD-10-CM

## 2017-09-19 DIAGNOSIS — Z98.890 POST-OPERATIVE STATE: ICD-10-CM

## 2017-09-19 DIAGNOSIS — M24.573 EQUINUS CONTRACTURE OF ANKLE: ICD-10-CM

## 2017-09-19 PROCEDURE — 97161 PT EVAL LOW COMPLEX 20 MIN: CPT | Mod: PN

## 2017-09-19 NOTE — PLAN OF CARE
TIME RECORD    Date: 09/19/2017    Start Time:  1105  Stop Time:  1150    PROCEDURES:    TIMED  Procedure Time Min.    Start:  Stop:     Start:  Stop:     Start:  Stop:     Start:  Stop:          UNTIMED  Procedure Time Min.   EVAL Start:  Stop:     Start:  Stop:      Total Timed Minutes:    Total Timed Units:    Total Untimed Units:  1  Charges Billed/# of units:  1    OUTPATIENT PHYSICAL THERAPY   PATIENT EVALUATION  Onset Date: 7/20/17 DOS.  Primary Diagnosis:   1. Toe pain, chronic, left     2. Equinus contracture of ankle     3. Post-operative state       Treatment Diagnosis: Gait abnormality.  Past Medical History:   Diagnosis Date    Asthma      Precautions: STD  Prior Therapy: Feb-June 2017.  Medications: Joselin Phillips has a current medication list which includes the following prescription(s): hydrocodone-acetaminophen 7.5-325mg and meloxicam.  Nutrition:  Overweight  History of Present Illness: Pt with chronic foot pain, received plantar fasciotomy 7/20/17.  Prior Level of Function: Independent  Social History: Works in [employer].  Place of Residence (Steps/Adaptations): In apt with .  Functional Deficits Leading to Referral/Nature of Injury: Difficulties with ADLs, functional activities, homemaking.  Patient Therapy Goals: Decrease pain, improve function.     Subjective     Joselin Phillips states .    Pain:  Location: left foot  Description: Burning, Throbbing and Variable  Activities Which Increase Pain: Standing, Bending, Walking, Extension, Flexing and Lifting  Activities Which Decrease Pain: relaxation, pain medication, lying down, rest and elevation  Pain Scale: 3/10 at best 3/10 now  10/10 at worst    Objective     Posture: Head forward,round shoulders.  Palpation: Tenderness at plantar fascia and calcaneal tubercle.   Sensation: Intact.  DTRs:  Range of Motion/Strength: ROM of left ankle is WFL/WNL.  Strength is grossly 3+/5 in all planes.     Flexibility: Gastroc,soleus  tight.  Gait: Without AD  Analysis: SBA - guarded,antalgic.  Bed Mobility:Independent  Transfers: Independent  Special Tests: FIG 8 LT;52.5, RT;51 cm.  Other: LEFS 45/80.  Treatment: EVAL    Assessment       Initial Assessment (Pertinent finding, problem list and factors affecting outcome): Pt presents strength deficits,gait abnormality, decreased function due to left foot pain.  Rehab Potiential: good    Short Term Goals (2 Weeks): 1.Decrease pain x 1 grade. 2.Start HEP.  Long Term Goals (4 Weeks): 1.Pain 0-4/10.2.Independent HEP. 3.Strength 5/5.4.Gait WNL.5.LEFS 60/80. 6.Restore previous FLORIN.    Plan     Certification Period: 9/19/17 to 10/19/17.  Recommended Treatment Plan: 2 times per week for 4 weeks: Electrical Stimulation PRN, Gait Training, Group Therapy, Manual Therapy, Moist Heat/ Ice, Therapeutic Activites, Therapeutic Exercise, Ultrasound/Phonophoresis and Whirlpool/Fluidotherapy  Other Recommendations: HEP.      Therapist: Waldemar Lazo, PT    I CERTIFY THE NEED FOR THESE SERVICES FURNISHED UNDER THIS PLAN OF TREATMENT AND WHILE UNDER MY CARE    Physician's comments: ________________________________________________________________________________________________________________________________________________      Physician's Name: ___________________________________

## 2017-09-25 RX ORDER — MELOXICAM 15 MG/1
TABLET ORAL
Qty: 30 TABLET | Refills: 0 | Status: SHIPPED | OUTPATIENT
Start: 2017-09-25 | End: 2019-05-27 | Stop reason: SDUPTHER

## 2017-09-26 ENCOUNTER — CLINICAL SUPPORT (OUTPATIENT)
Dept: REHABILITATION | Facility: HOSPITAL | Age: 47
End: 2017-09-26
Attending: PODIATRIST
Payer: COMMERCIAL

## 2017-09-26 DIAGNOSIS — M79.672 LEFT FOOT PAIN: Primary | ICD-10-CM

## 2017-09-26 PROCEDURE — 97110 THERAPEUTIC EXERCISES: CPT | Mod: PN

## 2017-09-26 PROCEDURE — 97022 WHIRLPOOL THERAPY: CPT | Mod: PN

## 2017-09-26 NOTE — PROGRESS NOTES
TIME RECORD    Date:  09/26/2017    Start Time:  1012  Stop Time:  1052    PROCEDURES:    TIMED  Procedure Time Min.   TE Start:1012  Stop:1037 25    Start:  Stop:     Start:  Stop:     Start:  Stop:          UNTIMED  Procedure Time Min.   FLUIDO  Start:1040  Stop:1052 12    Start:  Stop:      Total Timed Minutes:  25  Total Timed Units:  2  Total Untimed Units:  1  Charges Billed/# of units:  3      Progress/Current Status    Subjective:     Patient ID: Joselin Phillips is a 46 y.o. female.  Diagnosis:   1. Left foot pain       Pain: 2 /10  Better.    Objective:     TE for ROM, strength f/b fluido x 12'.    Assessment:     Relief following fluido.    Patient Education/Response:     Gait pattern ed.    Plans and Goals:     Cont per POC.

## 2017-09-26 NOTE — PROGRESS NOTES
09/26/17 1000   Ankle Exercises   Double Leg Calf Raises Reps/Sets/Weight 30   Standing Dorsiflexion Stretch(Gastroc) Reps/Sets/Hold Time 3X30   Additional Exercises   Additional Exercise bike 10   Additional Exercise ANKLE X 4 GTB 30   Additional Exercise MTT 4'

## 2017-12-04 ENCOUNTER — TELEPHONE (OUTPATIENT)
Dept: PODIATRY | Facility: CLINIC | Age: 47
End: 2017-12-04

## 2017-12-04 NOTE — TELEPHONE ENCOUNTER
----- Message from Danette Flowers sent at 12/4/2017 12:23 PM CST -----  Patient needs to speak with nurse about paperwork with release date to return to work/has question concerning/please call back at [phone].

## 2017-12-07 NOTE — TELEPHONE ENCOUNTER
Mrs Ellsworth dropped of paperwork today stating the she can go back to work from her surgery from 07/20/2017. Last office visit 08/31/2017. Will need it faxed to [phone]. Paperwork placed in Dr Guan's box to be completed.

## 2017-12-11 ENCOUNTER — TELEPHONE (OUTPATIENT)
Dept: PODIATRY | Facility: CLINIC | Age: 47
End: 2017-12-11

## 2017-12-11 NOTE — TELEPHONE ENCOUNTER
Spoke with Ms Jodee she stated that her HR department lost her return to work letter.  She ask that I fax the letter to her HR department.

## 2017-12-11 NOTE — TELEPHONE ENCOUNTER
----- Message from Carmela Levine sent at 12/11/2017 10:13 AM CST -----  Contact: Patient  Patient states that some paperwork was left off for Dr Guan to sign and it was to be left in front so she can come by and pick it up.  Can you please call the patient back at [phone].  Thank you

## 2017-12-19 ENCOUNTER — TELEPHONE (OUTPATIENT)
Dept: PODIATRY | Facility: CLINIC | Age: 47
End: 2017-12-19

## 2017-12-19 NOTE — TELEPHONE ENCOUNTER
----- Message from Cookie Márquez sent at 12/19/2017 12:24 PM CST -----  Contact: self  Patient called regarding paperwork to be completed and faxed to employer before 12/27. Please contact work at [phone] and ask for Thalia

## 2017-12-21 ENCOUNTER — TELEPHONE (OUTPATIENT)
Dept: PODIATRY | Facility: CLINIC | Age: 47
End: 2017-12-21

## 2017-12-21 NOTE — TELEPHONE ENCOUNTER
----- Message from Lulu Rdz sent at 12/21/2017  2:51 PM CST -----  Contact: Patient  Joselin, patient [phone] or work [phone] push 0 and ask for casper. Transferred to POD. Cristian

## 2017-12-21 NOTE — TELEPHONE ENCOUNTER
Notified Ms Phillips that paperwork has been faxed with dates of surgery and date that she can return to work.

## 2017-12-21 NOTE — TELEPHONE ENCOUNTER
----- Message from Emmy Garcia sent at 12/21/2017  2:09 PM CST -----  Contact: Elva Biggs from [employer]  Returning call. On page 3 #5 asking for dates for estimated date beginning and ending dates for incapacity. Please call [phone]. Thanks!

## 2017-12-21 NOTE — TELEPHONE ENCOUNTER
----- Message from Leeann Florentino sent at 12/21/2017 12:42 PM CST -----  Call [phone] / [employer] about fmla paperwork  / patient states she spoke to nurse a few min's ago

## 2017-12-21 NOTE — TELEPHONE ENCOUNTER
Explained to Ms Elva Biggs With Human resources at [employer] that Ms Ellsworth was to return to on 08/07/2017 in walking boot. Paperwork signed today stated that she was cleared to return to work on 08/10/2017.

## 2017-12-21 NOTE — TELEPHONE ENCOUNTER
Received incoming call from Joselin asking to have paperwork from Dr Guan stating that she can return to work. Explained that the paperwork she turned in to us is not the correct paperwork. Dr Guan did complete it and It has been faxed along with the letter that he completed.

## 2017-12-21 NOTE — TELEPHONE ENCOUNTER
Spoke with Ms Phillips explained that I did fax the [facility] paperwork to [employer] at her request. Unable to reach that department.

## 2017-12-21 NOTE — TELEPHONE ENCOUNTER
Dates added to reflect Surgery completed on 07/20/2017 and return to work in boot Letter written by Dr Guan on 08/07/2017. Message left that paperwork has been re-faxed with the dates added to paperwork.

## 2018-04-19 ENCOUNTER — OFFICE VISIT (OUTPATIENT)
Dept: PODIATRY | Facility: CLINIC | Age: 48
End: 2018-04-19
Payer: COMMERCIAL

## 2018-04-19 ENCOUNTER — HOSPITAL ENCOUNTER (OUTPATIENT)
Dept: RADIOLOGY | Facility: CLINIC | Age: 48
Discharge: HOME | End: 2018-04-19
Attending: PODIATRIST
Payer: COMMERCIAL

## 2018-04-19 VITALS — BODY MASS INDEX: 32.69 KG/M2 | HEIGHT: 63 IN | WEIGHT: 184.5 LBS

## 2018-04-19 DIAGNOSIS — M72.2 PLANTAR FASCIITIS: ICD-10-CM

## 2018-04-19 DIAGNOSIS — M79.672 FOOT PAIN, LEFT: ICD-10-CM

## 2018-04-19 DIAGNOSIS — M24.573 EQUINUS CONTRACTURE OF ANKLE: ICD-10-CM

## 2018-04-19 DIAGNOSIS — M24.573 EQUINUS CONTRACTURE OF ANKLE: Primary | ICD-10-CM

## 2018-04-19 PROCEDURE — 99999 PR PBB SHADOW E&M-EST. PATIENT-LVL III: CPT | Mod: PBBFAC,,, | Performed by: PODIATRIST

## 2018-04-19 PROCEDURE — 99213 OFFICE O/P EST LOW 20 MIN: CPT | Mod: S$GLB,,, | Performed by: PODIATRIST

## 2018-04-19 PROCEDURE — 73630 X-RAY EXAM OF FOOT: CPT | Mod: TC,FY,PO,LT

## 2018-04-19 PROCEDURE — 73630 X-RAY EXAM OF FOOT: CPT | Mod: 26,LT,S$GLB, | Performed by: RADIOLOGY

## 2018-04-19 RX ORDER — LIDOCAINE HYDROCHLORIDE 20 MG/ML
JELLY TOPICAL
Qty: 30 ML | Refills: 2 | Status: SHIPPED | OUTPATIENT
Start: 2018-04-19 | End: 2019-06-25 | Stop reason: ALTCHOICE

## 2018-04-19 NOTE — PROGRESS NOTES
Subjective:      Patient ID: Joselin Phillips is a 47 y.o. female.    Chief Complaint: Foot Pain (aching burning pain in left foot )    Deep aching pain of rearfoot/ankle area left and achilles tendon.  Gradual onset, worsening over past several weeks, aggravated by increased weight bearing, shoe gear, pressure.  No previous medical treatment.  OTC pain med not helping.  Denies trauma, relates EPF left prior by me.    ROS        Objective:      Physical Exam   Constitutional: She is oriented to person, place, and time. She appears well-developed and well-nourished. She is cooperative. No distress.   Cardiovascular:   Pulses:       Popliteal pulses are 2+ on the right side, and 2+ on the left side.        Dorsalis pedis pulses are 2+ on the right side, and 2+ on the left side.        Posterior tibial pulses are 2+ on the right side, and 2+ on the left side.   Capillary refill 3 seconds all toes/distal feet, all toes/both feet warm to touch.      Negative lymphadenopathy bilateral popliteal fossa and tarsal tunnel.      Negavie lower extremity edema bilateral.     Musculoskeletal:        Right ankle: She exhibits normal range of motion, no swelling, no ecchymosis, no deformity, no laceration and normal pulse. Achilles tendon normal. Achilles tendon exhibits no pain, no defect and normal Rodríguez's test results.   Sharp deep pain to palpation inferior heel left at medial calcaneal tubercle without ecchymosis, erythema, edema, or cardinal signs infection, and no signs of trauma.    Pain to palpation distal 10 cm of left achilles tendon without defect or loss of function.    Deep aching pain heel and midfoot with prolonged standing not elicited in office.     Otherwise, Normal angle, base, station of gait. All ten toes without clubbing, cyanosis, or signs of ischemia.  No pain to palpation bilateral lower extremities.  Range of motion, stability, muscle strength, and muscle tone normal bilateral feet and legs.      Lymphadenopathy:   Negative lymphadenopathy bilateral popliteal fossa and tarsal tunnel.    Negative lymphangitic streaking bilateral feet/ankles/legs.   Neurological: She is alert and oriented to person, place, and time. She has normal strength. She displays no atrophy and no tremor. No sensory deficit. She exhibits normal muscle tone. Gait normal.   Reflex Scores:       Patellar reflexes are 2+ on the right side and 2+ on the left side.       Achilles reflexes are 2+ on the right side and 2+ on the left side.  Negative tinel sign to percussion sural, superficial peroneal, deep peroneal, saphenous, and posterior tibial nerves right and left ankles and feet.     Skin: Skin is warm, dry and intact. Capillary refill takes 2 to 3 seconds. No abrasion, no bruising, no burn, no ecchymosis, no laceration, no lesion and no rash noted. She is not diaphoretic. No cyanosis or erythema. No pallor. Nails show no clubbing.     Skin is normal age and health appropriate color, turgor, texture, and temperature bilateral lower extremities without ulceration, hyperpigmentation, discoloration, masses nodules or cords palpated.  No ecchymosis, erythema, edema, or cardinal signs of infection bilateral lower extremities.     Psychiatric: She has a normal mood and affect.             Assessment:       Encounter Diagnoses   Name Primary?    Equinus contracture of ankle Yes    Foot pain, left     Plantar fasciitis          Plan:       Joselin was seen today for foot pain.    Diagnoses and all orders for this visit:    Equinus contracture of ankle  -     Ambulatory consult to Physical Therapy  -     ORTHOTIC DEVICE (DME)    Foot pain, left  -     Ambulatory consult to Physical Therapy  -     ORTHOTIC DEVICE (DME)    Plantar fasciitis  -     Ambulatory consult to Physical Therapy  -     ORTHOTIC DEVICE (DME)    Other orders  -     lidocaine HCL 2% (XYLOCAINE) 2 % jelly; Apply topically as needed. Apply topically once nightly to  affected part of foot/feet.      I counseled the patient on her conditions, their implications and medical management.        Patient will stretch the tendo achilles complex three times daily as demonstrated in the office.  Literature was dispensed illustrating proper stretching technique.    Patient will obtain over the counter arch supports and wear them in shoes whenever possible.  Athletic shoes intended for walking or running are usually best.    The patient was advised that NSAID-type medications have two very important potential side effects: gastrointestinal irritation including hemorrhage and renal injuries. She was asked to take the medication with food and to stop if she experiences any GI upset. I asked her to call for vomiting, abdominal pain or black/bloody stools. The patient expresses understanding of these issues and questions were answered.    Discussed conservative treatment with shoes of adequate dimensions, material, and style to alleviate symptoms and delay or prevent surgical intervention.    Rx PT, custom orthotics, lidocaine gel.    Counseled on conservative treatments including custom orthotics, shoe and activity change, physical therapy, antiinflammatory, and pain medication, and sometimes injections for symptomatic relief.    Counseled on surgical correction,- TRAVIS left with 3 month recovery before return to shoes- risks and benefits, including, but not limited to recurrence, infection, pain, scar, poor cosmesis, loss of function, arthritis, healing in poor position, new or different ulceration or pre ulcerative callus, nerve pain, nerve damage, numbness, syndromes (RSD), need for future surgical procedures, and or long term use of orthotics, blood clots of leg, lung, heart, brain, death.    Consider carefully, appoint with pcp for surgical clearance, follow here prn as symptoms dictate for consent, post op Rx, and scheduling.    xrays left.        Follow-up in about 1 month (around  5/19/2018).

## 2018-04-23 ENCOUNTER — TELEPHONE (OUTPATIENT)
Dept: PODIATRY | Facility: CLINIC | Age: 48
End: 2018-04-23

## 2018-04-23 NOTE — TELEPHONE ENCOUNTER
----- Message from Emmy Garcia sent at 4/23/2018  8:01 AM CDT -----  Contact: Thalia  Type:  Test Results    Who Called:  patient  Name of Test (Lab/Mammo/Etc):  X-ray  Date of Test:  4/19/18  Ordering Provider:  Dr Guan  Where the test was performed:  [facility]  Best Call Back Number:  [phone]  Additional Information:  na

## 2019-05-24 ENCOUNTER — DOCUMENTATION ONLY (OUTPATIENT)
Dept: FAMILY MEDICINE | Facility: CLINIC | Age: 49
End: 2019-05-24

## 2019-05-24 NOTE — PROGRESS NOTES
Pre-Visit Chart Review  For Appointment Scheduled on 5/27/2019.       Health Maintenance Due   Topic Date Due    TETANUS VACCINE  12/13/1986    Colonoscopy  12/13/2018    Mammogram  06/02/2019

## 2019-05-27 ENCOUNTER — HOSPITAL ENCOUNTER (OUTPATIENT)
Dept: RADIOLOGY | Facility: CLINIC | Age: 49
Discharge: HOME | End: 2019-05-27
Attending: PHYSICIAN ASSISTANT
Payer: COMMERCIAL

## 2019-05-27 ENCOUNTER — OFFICE VISIT (OUTPATIENT)
Dept: FAMILY MEDICINE | Facility: CLINIC | Age: 49
End: 2019-05-27
Payer: COMMERCIAL

## 2019-05-27 VITALS
DIASTOLIC BLOOD PRESSURE: 71 MMHG | WEIGHT: 185.63 LBS | TEMPERATURE: 98 F | HEIGHT: 63 IN | BODY MASS INDEX: 32.89 KG/M2 | HEART RATE: 108 BPM | SYSTOLIC BLOOD PRESSURE: 110 MMHG

## 2019-05-27 DIAGNOSIS — M54.50 CHRONIC MIDLINE LOW BACK PAIN WITHOUT SCIATICA: ICD-10-CM

## 2019-05-27 DIAGNOSIS — G89.29 CHRONIC MIDLINE LOW BACK PAIN WITHOUT SCIATICA: ICD-10-CM

## 2019-05-27 DIAGNOSIS — M54.50 CHRONIC MIDLINE LOW BACK PAIN WITHOUT SCIATICA: Primary | ICD-10-CM

## 2019-05-27 DIAGNOSIS — G89.29 CHRONIC MIDLINE LOW BACK PAIN WITHOUT SCIATICA: Primary | ICD-10-CM

## 2019-05-27 PROCEDURE — 99999 PR PBB SHADOW E&M-EST. PATIENT-LVL IV: CPT | Mod: PBBFAC,,, | Performed by: PHYSICIAN ASSISTANT

## 2019-05-27 PROCEDURE — 72100 XR LUMBAR SPINE AP AND LATERAL: ICD-10-PCS | Mod: 26,,, | Performed by: RADIOLOGY

## 2019-05-27 PROCEDURE — 99999 PR PBB SHADOW E&M-EST. PATIENT-LVL IV: ICD-10-PCS | Mod: PBBFAC,,, | Performed by: PHYSICIAN ASSISTANT

## 2019-05-27 PROCEDURE — 72100 X-RAY EXAM L-S SPINE 2/3 VWS: CPT | Mod: TC,FY,PO

## 2019-05-27 PROCEDURE — 99203 OFFICE O/P NEW LOW 30 MIN: CPT | Mod: S$GLB,,, | Performed by: PHYSICIAN ASSISTANT

## 2019-05-27 PROCEDURE — 99203 PR OFFICE/OUTPT VISIT, NEW, LEVL III, 30-44 MIN: ICD-10-PCS | Mod: S$GLB,,, | Performed by: PHYSICIAN ASSISTANT

## 2019-05-27 PROCEDURE — 72100 X-RAY EXAM L-S SPINE 2/3 VWS: CPT | Mod: 26,,, | Performed by: RADIOLOGY

## 2019-05-27 PROCEDURE — 3008F BODY MASS INDEX DOCD: CPT | Mod: CPTII,S$GLB,, | Performed by: PHYSICIAN ASSISTANT

## 2019-05-27 PROCEDURE — 3008F PR BODY MASS INDEX (BMI) DOCUMENTED: ICD-10-PCS | Mod: CPTII,S$GLB,, | Performed by: PHYSICIAN ASSISTANT

## 2019-05-27 RX ORDER — BACLOFEN 10 MG/1
10 TABLET ORAL NIGHTLY
Qty: 30 TABLET | Refills: 2 | Status: SHIPPED | OUTPATIENT
Start: 2019-05-27 | End: 2020-05-13 | Stop reason: SDUPTHER

## 2019-05-27 RX ORDER — MELOXICAM 15 MG/1
15 TABLET ORAL DAILY
Qty: 30 TABLET | Refills: 2 | Status: SHIPPED | OUTPATIENT
Start: 2019-05-27 | End: 2020-05-13 | Stop reason: SDUPTHER

## 2019-05-27 NOTE — PROGRESS NOTES
Subjective:       Patient ID: Joselin Phillips is a 48 y.o. female.    Chief Complaint: Back Pain    Back Pain   This is a chronic problem. The current episode started more than 1 year ago. The problem occurs constantly. The problem has been waxing and waning since onset. The pain is present in the lumbar spine. The quality of the pain is described as aching. The pain is at a severity of 4/10. The symptoms are aggravated by sitting and lying down. Pertinent negatives include no bladder incontinence, bowel incontinence, leg pain, paresis, paresthesias or weakness. Treatments tried: PT      Review of Systems   Gastrointestinal: Negative for bowel incontinence.   Genitourinary: Negative for bladder incontinence.   Musculoskeletal: Positive for back pain.   Neurological: Negative for weakness and paresthesias.       Objective:      Physical Exam   Constitutional: She appears well-developed and well-nourished. No distress.   Appears uncomfortable and in pain   Cardiovascular: Normal rate and regular rhythm.   No murmur heard.  Pulmonary/Chest: Effort normal and breath sounds normal. She has no wheezes. She has no rales.   Abdominal: Soft. Bowel sounds are normal. She exhibits no distension. There is no tenderness.   Musculoskeletal:        Right hip: She exhibits normal range of motion and normal strength.        Left hip: She exhibits normal range of motion and normal strength.        Lumbar back: She exhibits tenderness. She exhibits normal range of motion.   Straight leg raise negative bilaterally  Tenderness is over the lumbar paraspinal muscles   Neurological:   Reflex Scores:       Patellar reflexes are 2+ on the right side and 2+ on the left side.       Achilles reflexes are 2+ on the right side and 2+ on the left side.  Nursing note and vitals reviewed.      Assessment:       1. Chronic midline low back pain without sciatica        Plan:     Joselin was seen today for back pain.    Diagnoses and all orders  for this visit:    Chronic midline low back pain without sciatica  -     X-Ray Lumbar Spine AP And Lateral; Future  -     Ambulatory Referral to [facility]    Other orders  -     meloxicam (MOBIC) 15 MG tablet; Take 1 tablet (15 mg total) by mouth once daily.  -     baclofen (LIORESAL) 10 MG tablet; Take 1 tablet (10 mg total) by mouth every evening.    Follow up for annual with pcp next .

## 2019-05-27 NOTE — PATIENT INSTRUCTIONS
Back Pain (Acute or Chronic)    Back pain is one of the most common problems. The good news is that most people feel better in 1 to 2 weeks, and most of the rest in 1 to 2 months. Most people can remain active.  People experience and describe pain differently; not everyone is the same.  · The pain can be sharp, stabbing, shooting, aching, cramping or burning.  · Movement, standing, bending, lifting, sitting, or walking may worsen pain.  · It can be localized to one spot or area, or it can be more generalized.  · It can spread or radiate upwards, to the front, or go down your arms or legs (sciatica).  · It can cause muscle spasm.  Most of the time, mechanical problems with the muscles or spine cause the pain. Mechanical problems are usually caused by an injury to the muscles or ligaments. While illness can cause back pain, it is usually not caused by a serious illness. Mechanical problems include:   · Physical activity such as sports, exercise, work, or normal activity  · Overexertion, lifting, pushing, pulling incorrectly or too aggressively  · Sudden twisting, bending, or stretching from an accident, or accidental movement  · Poor posture  · Stretching or moving wrong, without noticing pain at the time  · Poor coordination, lack of regular exercise (check with your doctor about this)  · Spinal disc disease or arthritis  · Stress  Pain can also be related to pregnancy, or illness like appendicitis, bladder or kidney infections, pelvic infections, and many other things.  Acute back pain usually gets better in 1 to 2 weeks. Back pain related to disk disease, arthritis in the spinal joints or spinal stenosis (narrowing of the spinal canal) can become chronic and last for months or years.  Unless you had a physical injury (for example, a car accident or fall) X-rays are usually not needed for the initial evaluation of back pain. If pain continues and does not respond to medical treatment, X-rays and other tests may be  needed.  Home care  Try these home care recommendations:  · When in bed, try to find a position of comfort. A firm mattress is best. Try lying flat on your back with pillows under your knees. You can also try lying on your side with your knees bent up towards your chest and a pillow between your knees.  · At first, do not try to stretch out the sore spots. If there is a strain, it is not like the good soreness you get after exercising without an injury. In this case, stretching may make it worse.  · Avoid prolong sitting, long car rides, or travel. This puts more stress on the lower back than standing or walking.  · During the first 24 to 72 hours after an acute injury or flare up of chronic back pain, apply an ice pack to the painful area for 20 minutes and then remove it for 20 minutes. Do this over a period of 60 to 90 minutes or several times a day. This will reduce swelling and pain. Wrap the ice pack in a thin towel or plastic to protect your skin.  · You can start with ice, then switch to heat. Heat (hot shower, hot bath, or heating pad) reduces pain and works well for muscle spasms. Heat can be applied to the painful area for 20 minutes then remove it for 20 minutes. Do this over a period of 60 to 90 minutes or several times a day. Do not sleep on a heating pad. It can lead to skin burns or tissue damage.  · You can alternate ice and heat therapy. Talk with your doctor about the best treatment for your back pain.  · Therapeutic massage can help relax the back muscles without stretching them.  · Be aware of safe lifting methods and do not lift anything without stretching first.  Medicines  Talk to your doctor before using medicine, especially if you have other medical problems or are taking other medicines.  · You may use over-the-counter medicine as directed on the bottle to control pain, unless another pain medicine was prescribed. If you have chronic conditions like diabetes, liver or kidney disease,  stomach ulcers, or gastrointestinal bleeding, or are taking blood thinners, talk to your doctor before taking any medicine.  · Be careful if you are given a prescription medicines, narcotics, or medicine for muscle spasms. They can cause drowsiness, affect your coordination, reflexes, and judgement. Do not drive or operate heavy machinery.  Follow-up care  Follow up with your healthcare provider, or as advised.   A radiologist will review any X-rays that were taken. Your provide will notify you of any new findings that may affect your care.  Call 911  Call emergency services if any of the following occur:  · Trouble breathing  · Confusion  · Very drowsy or trouble awakening  · Fainting or loss of consciousness  · Rapid or very slow heart rate  · Loss of bowel or bladder control  When to seek medical advice  Call your healthcare provider right away if any of these occur:   · Pain becomes worse or spreads to your legs  · Weakness or numbness in one or both legs  · Numbness in the groin or genital area  Date Last Reviewed: 7/1/2016 © 2000-2017 [employer]. [address] [place] [address]. All rights reserved. This information is not intended as a substitute for professional medical care. Always follow your healthcare professional's instructions.        Back Care Tips    Caring for your back  These are things you can do to prevent a recurrence of acute back pain and to reduce symptoms from chronic back pain:  · Maintain a healthy weight. If you are overweight, losing weight will help most types of back pain.  · Exercise is an important part of recovery from most types of back pain. The muscles behind and in front of the spine support the back. This means strengthening both the back muscles and the abdominal muscles will provide better support for your spine.   · Swimming and brisk walking are good overall exercises to improve your fitness level.  · Practice safe lifting methods  (below).  · Practice good posture when sitting, standing and walking. Avoid prolonged sitting. This puts more stress on the lower back than standing or walking.  · Wear quality shoes with sufficient arch support. Foot and ankle alignment can affect back symptoms. Women should avoid wearing high heels.  · Therapeutic massage can help relax the back muscles without stretching them.  · During the first 24 to 72 hours after an acute injury or flare-up of chronic back pain, apply an ice pack to the painful area for 20 minutes and then remove it for 20 minutes, over a period of 60 to 90 minutes, or several times a day. As a safety precaution, do not use a heating pad at bedtime. Sleeping on a heating pad can lead to skin burns or tissue damage.  · You can alternate ice and heat therapies.  Medications  Talk to your healthcare provider before using medicines, especially if you have other medical problems or are taking other medicines.  · You may use acetaminophen or ibuprofen to control pain, unless your healthcare provider prescribed other pain medicine. If you have chronic conditions like diabetes, liver or kidney disease, stomach ulcers, or gastrointestinal bleeding, or are taking blood thinners, talk with your healthcare provider before taking any medicines.  · Be careful if you are given prescription pain medicines, narcotics, or medicine for muscle spasm. They can cause drowsiness, affect your coordination, reflexes, and judgment. Do not drive or operate heavy machinery while taking these types of medicines. Take prescription pain medicine only as prescribed by your healthcare provider.  Lumbar stretch  Here is a simple stretching exercise that will help relax muscle spasm and keep your back more limber. If exercise makes your back pain worse, dont do it.  · Lie on your back with your knees bent and both feet on the ground.  · Slowly raise your left knee to your chest as you flatten your lower back against the  floor. Hold for 5 seconds.  · Relax and repeat the exercise with your right knee.  · Do 10 of these exercises for each leg.  Safe lifting method  · Dont bend over at the waist to lift an object off the floor.  Instead, bend your knees and hips in a squat.   · Keep your back and head upright  · Hold the object close to your body, directly in front of you.  · Straighten your legs to lift the object.   · Lower the object to the floor in the reverse fashion.  · If you must slide something across the floor, push it.  Posture tips  Sitting  Sit in chairs with straight backs or low-back support. Keep your knees lower than your hips, with your feet flat on the floor.  When driving, sit up straight. Adjust the seat forward so you are not leaning toward the steering wheel.  A small pillow or rolled towel behind your lower back may help if you are driving long distances.   Standing  When standing for long periods, shift most of your weight to one leg at a time. Alternate legs every few minutes.   Sleeping  The best way to sleep is on your side with your knees bent. Put a low pillow under your head to support your neck in a neutral spine position. Avoid thick pillows that bend your neck to one side. Put a pillow between your legs to further relax your lower back. If you sleep on your back, put pillows under your knees to support your legs in a slightly flexed position. Use a firm mattress. If your mattress sags, replace it, or use a 1/2-inch plywood board under the mattress to add support.  Follow-up care  Follow up with your healthcare provider, or as advised.  If X-rays, a CT scan or an MRI scan were taken, they will be reviewed by a radiologist. You will be notified of any new findings that may affect your care.  Call 911  Seek emergency medical care if any of the following occur:  · Trouble breathing  · Confusion  · Very drowsy  · Fainting or loss of consciousness  · Rapid or very slow heart rate  · Loss of  bowel or  bladder control  When to seek medical care  Call your healthcare provider if any of the following occur:  · Pain becomes worse or spreads to your arms or legs  · Weakness or numbness in one or both arms or legs  · Numbness in the groin area  Date Last Reviewed: 6/1/2016  © 1230-3456 [employer]. [address] [place] [address], [place] [address]. All rights reserved. This information is not intended as a substitute for professional medical care. Always follow your healthcare professional's instructions.

## 2019-06-03 ENCOUNTER — CLINICAL SUPPORT (OUTPATIENT)
Dept: REHABILITATION | Facility: HOSPITAL | Age: 49
End: 2019-06-03
Attending: PHYSICAL MEDICINE & REHABILITATION
Payer: COMMERCIAL

## 2019-06-03 ENCOUNTER — INITIAL CONSULT (OUTPATIENT)
Dept: SPINE | Facility: CLINIC | Age: 49
End: 2019-06-03
Payer: COMMERCIAL

## 2019-06-03 VITALS
HEART RATE: 89 BPM | WEIGHT: 185 LBS | DIASTOLIC BLOOD PRESSURE: 71 MMHG | BODY MASS INDEX: 32.78 KG/M2 | SYSTOLIC BLOOD PRESSURE: 111 MMHG | HEIGHT: 63 IN

## 2019-06-03 DIAGNOSIS — G89.29 CHRONIC LEFT-SIDED LOW BACK PAIN WITHOUT SCIATICA: Primary | ICD-10-CM

## 2019-06-03 DIAGNOSIS — M54.50 CHRONIC LEFT-SIDED LOW BACK PAIN WITHOUT SCIATICA: Primary | ICD-10-CM

## 2019-06-03 DIAGNOSIS — M53.86 DECREASED ROM OF LUMBAR SPINE: ICD-10-CM

## 2019-06-03 PROCEDURE — 99204 OFFICE O/P NEW MOD 45 MIN: CPT | Mod: ,,, | Performed by: PHYSICAL MEDICINE & REHABILITATION

## 2019-06-03 PROCEDURE — 99204 PR OFFICE/OUTPT VISIT, NEW, LEVL IV, 45-59 MIN: ICD-10-PCS | Mod: ,,, | Performed by: PHYSICAL MEDICINE & REHABILITATION

## 2019-06-03 PROCEDURE — 3008F PR BODY MASS INDEX (BMI) DOCUMENTED: ICD-10-PCS | Mod: ,,, | Performed by: PHYSICAL MEDICINE & REHABILITATION

## 2019-06-03 PROCEDURE — 97161 PT EVAL LOW COMPLEX 20 MIN: CPT | Mod: PN

## 2019-06-03 PROCEDURE — 3008F BODY MASS INDEX DOCD: CPT | Mod: ,,, | Performed by: PHYSICAL MEDICINE & REHABILITATION

## 2019-06-03 NOTE — LETTER
Linn 3, 2019      RAMAN Gracia  [address] [place] [address]           [facility]  [address] [place] [address] [address] Goins Archana  [place] [address]  Phone: [phone]  Fax: [phone]          Patient: Joselin Phillips   MR Number: [id number]   YOB: 1970   Date of Visit: 6/3/2019       Dear Lydia Hagen:    Thank you for referring Joselin Phillips to me for evaluation. Attached you will find relevant portions of my assessment and plan of care.    If you have questions, please do not hesitate to call me. I look forward to following Joselin Phillips along with you.    Sincerely,    Francis Steel MD    Enclosure  CC:  No Recipients    If you would like to receive this communication electronically, please contact [email or web address] or [phone] to request more information on [employer] access.    For providers and/or their staff who would like to refer a patient to [facility], please contact us through our one-stop-shop provider referral line, [facility], at [phone].    If you feel you have received this communication in error or would no longer like to receive these types of communications, please e-mail [email or web address]

## 2019-06-03 NOTE — PROGRESS NOTES
SUBJECTIVE:    Patient ID: Joselin Phillips is a 48 y.o. female.    Chief Complaint: New Patient (Chronic midline LBP)    This is a 48-year-old woman who has no primary care physician and no chronic major medical problems.  She has a long history of low back pain and has had physical therapy with some success in the past.  She presented to [facility] recently with complaints of low back pain and was subsequently referred here.  X-rays of the lumbar spine show moderate degenerative changes particularly at L5-S1.  The patient presents with complaints of primarily left-sided low back pain at the lumbosacral junction that is worse after prolonged sitting or 1st thing in the morning and gets better with activity.  No associated radicular symptoms or weakness.  No bowel or bladder dysfunction fever chills sweats or unexpected weight loss.  Other than physical therapy she has not had any treatment for her back.  She was started recently on baclofen and Mobic which has been effective.  Current pain level is about 6 out 10        Past Medical History:   Diagnosis Date    Asthma      Social History     Socioeconomic History    Marital status:      Spouse name: Not on file    Number of children: Not on file    Years of education: Not on file    Highest education level: Not on file   Occupational History    Not on file   Social Needs    Financial resource strain: Not on file    Food insecurity:     Worry: Not on file     Inability: Not on file    Transportation needs:     Medical: Not on file     Non-medical: Not on file   Tobacco Use    Smoking status: Never Smoker   Substance and Sexual Activity    Alcohol use: No    Drug use: Not on file    Sexual activity: Not on file   Lifestyle    Physical activity:     Days per week: Not on file     Minutes per session: Not on file    Stress: Not on file   Relationships    Social connections:     Talks on phone: Not on file     Gets together: Not on  "file     Attends Alevism service: Not on file     Active member of club or organization: Not on file     Attends meetings of clubs or organizations: Not on file     Relationship status: Not on file   Other Topics Concern    Not on file   Social History Narrative    Not on file     Past Surgical History:   Procedure Laterality Date    ENDOSCOPIC PLANTAR FASCIOTOMY left Left 7/20/2017    [facility]    FOOT SURGERY      TUBAL LIGATION Bilateral      Family History   Problem Relation Age of Onset    Heart disease Father     Hypertension Father     Kidney disease Father      Vitals:    06/03/19 0844   BP: 111/71   Pulse: 89   Weight: 83.9 kg (185 lb)   Height: 5' 3" (1.6 m)       Review of Systems   Constitutional: Negative for chills, diaphoresis, fatigue, fever and unexpected weight change.   HENT: Negative for trouble swallowing.    Eyes: Negative for visual disturbance.   Respiratory: Negative for shortness of breath.    Cardiovascular: Negative for chest pain.   Gastrointestinal: Negative for abdominal pain, constipation, nausea and vomiting.   Genitourinary: Negative for difficulty urinating.   Musculoskeletal: Negative for arthralgias, back pain, gait problem, joint swelling, myalgias, neck pain and neck stiffness.   Neurological: Negative for dizziness, speech difficulty, weakness, light-headedness, numbness and headaches.          Objective:      Physical Exam   Constitutional: She is oriented to person, place, and time. She appears well-developed and well-nourished.   Neurological: She is alert and oriented to person, place, and time.   She is awake and in no acute distress  Mild tenderness to palpation left lumbar paraspinous musculature with no palpable masses  Deep tendon reflexes are +1 at both knees and trace at both ankles  Strength is normal in both lower extremities  She can heel and toe walk normally           Assessment:       1. Chronic left-sided low back " pain without sciatica           Plan:     she has a nonfocal examination from a neurological standpoint and no historical red flags.  I recommend getting back in to outpatient physical therapy then continuing with a home exercise program.  Continue her current medications as needed.  Follow-up in 6 weeks.  Consider MRI and subsequent epidural steroid injection      Chronic left-sided low back pain without sciatica  -     Ambulatory Referral to Physical/Occupational Therapy

## 2019-06-03 NOTE — PLAN OF CARE
"TIME RECORD    06/03/2019    Start Time:  1710   Stop Time:  1805    PROCEDURES:    TIMED  Procedure Time Min.    Start:  Stop:     Start:  Stop:     Start:  Stop:     Start:  Stop:          UNTIMED  Procedure Time Min.   Evaluation Start:  Stop:     Start:  Stop:      Total Timed Minutes:  0  Total Timed Units:  0  Total Untimed Units:  1  Charges Billed/# of units:  1    OUTPATIENT PHYSICAL THERAPY   PATIENT EVALUATION  Onset Date: 3-4 months  Problem List Items Addressed This Visit     Decreased ROM of lumbar spine          Medical Diagnosis: M54.5,G89.29 (ICD-10-CM) - Chronic left-sided low back pain without sciatica  Treatment Diagnosis: impaired mobility due to back dysfunction    Past Medical History:   Diagnosis Date    Asthma        Past Surgical History:   Procedure Laterality Date    ENDOSCOPIC PLANTAR FASCIOTOMY left Left 7/20/2017    [facility]    FOOT SURGERY      TUBAL LIGATION Bilateral        has a current medication list which includes the following prescription(s): baclofen, lidocaine hcl 2%, and meloxicam.    Precautions: standard  Prior Therapy: remote h/o PT to address back  History of Present Illness: Pt presents to PT with insidious onset of low back pain years ago, but with worsening in the past few months. Reports she is now unable to get out of bed in the morning without her  helping her. Also has difficulty/pain with sitting and driving, particularly for prolonged periods of time. Xrays taken on 05/27/2019 show "mild-to-moderate disc space narrowing at the L5-S1 level with mild disc space narrowing at the L3-4 level.  There is mild multilevel facet joint arthropathy." Pt has been assessed by Dr. Steel with the back and spine clinic and thus referred to PT.    Prior Level of Function: Independent  Social History: employed as a linen technician for [employer]      Current level of function: Independent   Functional Deficits Leading to " "Referral/Nature of Injury: weakness, impaired endurance, impaired functional mobility, pain and decreased ROM  Patient Therapy Goals: "Less pain."      Subjective     Joselin Phillips denies radicular pain/paraesthesias or leg weakness.    Pain:  Location:  Left lower back  Description: sharp, spasms  Activities Which Increase Pain: bending/lifting, prolonged sitting, driving, twisting, supine/sidelying positions  Activities Which Decrease Pain: mobic, muscle relaxer  Pain Scale: 1/10 at best 5/10 now  10/10 at worst        Objective     Posture/Appearance: Fwd head position, rounded shoulders  Palpation: TTP left lumbar paraspinals, PSIS, glute/piriformis  Sensation: Intact to LT  DTRs:  1+ bilateral patellars and trace at B achilles  Range of Motion/Strength:      Lumbar AROM: Pain/Dysfunction with Movement:   Flexion 18*    Extension 12*    Right side bending WFL    Left side bending WFL      Increase pain with flexion>ext    Lower Extremity Range of Motion:  Right Lower Extremity: WFL  Left Lower Extremity: WFL    Lower Extremity Strength:  Right Lower Extremity: 4+/5 to 5/5  Left Lower Extremity: 4+/5 to 5/5    Flexibility: ROM as per above  Gait: Independent  Bed Mobility: Supine<>sit independent using logrolling technique and cues  Transfers: Sit<>stand independent  Special Tests:   SLR (-)   + Herlinda's B  Functional Outcome Measure: The Revised Oswestry Low Back Pain Questionnaire: 15/50=30% impairment  Treatment: Educated on role/goal PT, POC. Instructed in PPT's to be performed 10x per day to tolerance and reviewed logrolling and no BLT for back protection strategy. Pt verbalizing understanding and agreement.     Assessment       Initial Assessment (Pertinent finding, problem list and factors affecting outcome): Patient presents to PT with c/o low back pain. Notable impairments include impaired endurance, impaired functional mobility, pain and decreased ROM, and impaired functional mobility. Patient " would benefit from skilled PT to address notable impairments and improve functional mobility to PLOF.      Rehab Potiential: good      Short Term Goals (3 Weeks): 1) Pt will initiate HEP 2) Patient will report ability to sit for 1 hour without increased pain to facilitate driving   Long Term Goals (6 Weeks): 1) Pt will be I with HEP 2) Pt will return to I/ADL's with pain < 5/10 and less than 25% sleep disruptions 3) Pt will improve Oswestry to <20% impairment    Plan     Certification Period: 06/03/2019 to 08/03/2019  Recommended Treatment Plan: 2 times per week for 6 weeks: Cervical/Lumbar Traction, Electrical Stimulation IFC PRN, Gait Training, Manual Therapy, Moist Heat/ Ice, Neuromuscular Re-ed, Patient Education, Therapeutic Activites, Therapeutic Exercise, Ultrasound and dry needling PRN      Thank you for referral.    Therapist: Mary Thomas, PT    I CERTIFY THE NEED FOR THESE SERVICES FURNISHED UNDER THIS PLAN OF TREATMENT AND WHILE UNDER MY CARE    Physician's comments: ________________________________________________________________________________________________________________________________________________      Physician's Name: ___________________________________

## 2019-06-11 ENCOUNTER — PATIENT OUTREACH (OUTPATIENT)
Dept: ADMINISTRATIVE | Facility: HOSPITAL | Age: 49
End: 2019-06-11

## 2019-06-11 NOTE — LETTER
June 11, 2019    Joselin Phillips  [address]  [place] [address]             [facility]  [address]  [facility] [address]  Phone: [phone] Dear Joselin Phillips,    [facility] is committed to your overall health.  To help you get the most out of each of your visits, we will review your information to make sure you are up to date on all of your recommended tests and/or procedures.      As a new patient to Dr. Pavan Palma MD , we may not have your complete medical records.  He has found that your chart shows you may be due for a:    COLORECTAL CANCER SCREENING  MAMMOGRAM    If you have had any of the above done at another facility, please bring the records or information with you so that your record at [facility] will be complete.      If you are currently taking medication, please bring it with you to your appointment for review.    Also, if you have any type of Advanced Directives, please bring them with you to your office visit so we may scan them into your chart.      Thank You,  Your [facility] Team  MD Brianna Kuo LPN Clinical Care Coordinator  [facility]  [address] [place] [address]  Phone [phone]  Fax [phone]

## 2019-06-11 NOTE — PROGRESS NOTES
Health Maintenance Due   Topic Date Due    TETANUS VACCINE  12/13/1986    Colonoscopy  12/13/2018    Mammogram  06/02/2019

## 2019-06-25 ENCOUNTER — DOCUMENTATION ONLY (OUTPATIENT)
Dept: FAMILY MEDICINE | Facility: CLINIC | Age: 49
End: 2019-06-25

## 2019-06-25 ENCOUNTER — OFFICE VISIT (OUTPATIENT)
Dept: FAMILY MEDICINE | Facility: CLINIC | Age: 49
End: 2019-06-25
Payer: COMMERCIAL

## 2019-06-25 VITALS
HEIGHT: 63 IN | DIASTOLIC BLOOD PRESSURE: 82 MMHG | BODY MASS INDEX: 32.73 KG/M2 | WEIGHT: 184.75 LBS | SYSTOLIC BLOOD PRESSURE: 120 MMHG | TEMPERATURE: 98 F | HEART RATE: 98 BPM

## 2019-06-25 DIAGNOSIS — Z12.31 SCREENING MAMMOGRAM, ENCOUNTER FOR: ICD-10-CM

## 2019-06-25 DIAGNOSIS — Z12.12 SCREENING FOR COLORECTAL CANCER: Primary | ICD-10-CM

## 2019-06-25 DIAGNOSIS — J30.89 CHRONIC NON-SEASONAL ALLERGIC RHINITIS: ICD-10-CM

## 2019-06-25 DIAGNOSIS — Z23 NEED FOR SHINGLES VACCINE: ICD-10-CM

## 2019-06-25 DIAGNOSIS — Z12.11 SCREENING FOR COLORECTAL CANCER: Primary | ICD-10-CM

## 2019-06-25 DIAGNOSIS — E66.9 OBESITY (BMI 30-39.9): ICD-10-CM

## 2019-06-25 DIAGNOSIS — Z23 NEED FOR TDAP VACCINATION: ICD-10-CM

## 2019-06-25 PROBLEM — G89.29 CHRONIC PAIN OF TOE OF LEFT FOOT: Status: RESOLVED | Noted: 2017-09-19 | Resolved: 2019-06-25

## 2019-06-25 PROBLEM — M79.675 CHRONIC PAIN OF TOE OF LEFT FOOT: Status: RESOLVED | Noted: 2017-09-19 | Resolved: 2019-06-25

## 2019-06-25 PROCEDURE — 90715 TDAP VACCINE 7 YRS/> IM: CPT | Mod: S$GLB,,, | Performed by: FAMILY MEDICINE

## 2019-06-25 PROCEDURE — 99214 PR OFFICE/OUTPT VISIT, EST, LEVL IV, 30-39 MIN: ICD-10-PCS | Mod: 25,S$GLB,, | Performed by: FAMILY MEDICINE

## 2019-06-25 PROCEDURE — 3008F BODY MASS INDEX DOCD: CPT | Mod: CPTII,S$GLB,, | Performed by: FAMILY MEDICINE

## 2019-06-25 PROCEDURE — 99999 PR PBB SHADOW E&M-EST. PATIENT-LVL III: ICD-10-PCS | Mod: PBBFAC,,, | Performed by: FAMILY MEDICINE

## 2019-06-25 PROCEDURE — 99214 OFFICE O/P EST MOD 30 MIN: CPT | Mod: 25,S$GLB,, | Performed by: FAMILY MEDICINE

## 2019-06-25 PROCEDURE — 3008F PR BODY MASS INDEX (BMI) DOCUMENTED: ICD-10-PCS | Mod: CPTII,S$GLB,, | Performed by: FAMILY MEDICINE

## 2019-06-25 PROCEDURE — 90750 ZOSTER RECOMBINANT VACCINE: ICD-10-PCS | Mod: S$GLB,,, | Performed by: FAMILY MEDICINE

## 2019-06-25 PROCEDURE — 90472 ZOSTER RECOMBINANT VACCINE: ICD-10-PCS | Mod: S$GLB,,, | Performed by: FAMILY MEDICINE

## 2019-06-25 PROCEDURE — 90471 TDAP VACCINE GREATER THAN OR EQUAL TO 7YO IM: ICD-10-PCS | Mod: S$GLB,,, | Performed by: FAMILY MEDICINE

## 2019-06-25 PROCEDURE — 90471 IMMUNIZATION ADMIN: CPT | Mod: S$GLB,,, | Performed by: FAMILY MEDICINE

## 2019-06-25 PROCEDURE — 90472 IMMUNIZATION ADMIN EACH ADD: CPT | Mod: S$GLB,,, | Performed by: FAMILY MEDICINE

## 2019-06-25 PROCEDURE — 90715 TDAP VACCINE GREATER THAN OR EQUAL TO 7YO IM: ICD-10-PCS | Mod: S$GLB,,, | Performed by: FAMILY MEDICINE

## 2019-06-25 PROCEDURE — 90750 HZV VACC RECOMBINANT IM: CPT | Mod: S$GLB,,, | Performed by: FAMILY MEDICINE

## 2019-06-25 PROCEDURE — 99999 PR PBB SHADOW E&M-EST. PATIENT-LVL III: CPT | Mod: PBBFAC,,, | Performed by: FAMILY MEDICINE

## 2019-06-25 RX ORDER — MONTELUKAST SODIUM 10 MG/1
10 TABLET ORAL NIGHTLY
Qty: 30 TABLET | Refills: 0 | Status: SHIPPED | OUTPATIENT
Start: 2019-06-25 | End: 2019-07-25

## 2019-06-25 NOTE — PROGRESS NOTES
Identified pt using name and . TDAP vaccine was administered in left deltoid using sterile technique. No bleeding noted at injection site.  Identified pt using name and . Explained procedure to pt. Understanding was verbalized. Administered Zoster vaccine IM  in right deltoid. Sterile technique was used. Pt tolerated procedure well, no residual bleeding noted at injection site.

## 2019-06-26 NOTE — PROGRESS NOTES
Subjective:       Patient ID: Joselin Phillips is a 48 y.o. female.    Chief Complaint: Establish Care    HPI   Patient presents to establish care with a new family doctor and discuss possible treatment options for her allergies.  She was previously followed by Dr. Taylor within our system.  She tells me outside of some chronic nasal congestion, postnasal drip and dry cough with laying down which has been worse lately after outdoor exposure she has been doing great she notes she has a history of chronic but sporadic back and feet pain and follows with Podiatry and a spine center for these.  She notes she had a flare of her pain last month but these have significantly improved and currently she is not having any pains.  She does suffer frequently from plantar fasciitis bilateral but the cause of her low back pain is somewhat unknown but assumed to be secondary to some mild arthritis and muscle strain.  She does not have any decrease in strength, coordination, sensation or active range of motion and denies any change in bowel or bladder.  She tells me she has suffered with allergy symptoms chronically but they have always been quite mild and she has not used anything other than nasal saline washes and rarely unknown store brand antihistamines over-the-counter both with good results.  She would like to start taking something daily to prevent symptoms.  She has no other concerns or complaints today.    Review of Systems   Constitutional: Negative for activity change, appetite change, fatigue and unexpected weight change.   HENT: Positive for congestion, postnasal drip, rhinorrhea and sneezing. Negative for dental problem, drooling, ear discharge, ear pain, facial swelling, hearing loss, mouth sores, nosebleeds, sinus pressure, sinus pain, sore throat, tinnitus, trouble swallowing and voice change.    Eyes: Positive for discharge and itching. Negative for photophobia, pain, redness and visual disturbance.  "  Respiratory: Positive for cough. Negative for chest tightness, shortness of breath and wheezing.    Cardiovascular: Negative for chest pain, palpitations and leg swelling.   Gastrointestinal: Negative for abdominal pain, blood in stool, constipation, diarrhea, nausea and vomiting.   Genitourinary: Negative for difficulty urinating, dysuria, flank pain, frequency and pelvic pain.   Musculoskeletal: Negative for arthralgias, back pain, gait problem, joint swelling, myalgias, neck pain and neck stiffness.   Skin: Negative for rash and wound.   Neurological: Negative for dizziness, tremors, syncope, weakness, light-headedness, numbness and headaches.   Hematological: Negative for adenopathy. Does not bruise/bleed easily.   Psychiatric/Behavioral: Negative for dysphoric mood and sleep disturbance. The patient is not nervous/anxious.          Objective:      Vitals:    06/25/19 1500   BP: 120/82   Pulse: 98   Temp: 98.2 °F (36.8 °C)   TempSrc: Oral   Weight: 83.8 kg (184 lb 11.9 oz)   Height: 5' 3" (1.6 m)   PainSc: 0-No pain     Physical Exam   Constitutional: She is oriented to person, place, and time. She appears well-developed and well-nourished. No distress.   HENT:   Head: Normocephalic and atraumatic.   Right Ear: Hearing, tympanic membrane, external ear and ear canal normal.   Left Ear: Hearing, tympanic membrane, external ear and ear canal normal.   Nose: Mucosal edema and rhinorrhea present. No nose lacerations, sinus tenderness or nasal deformity. Right sinus exhibits no maxillary sinus tenderness and no frontal sinus tenderness. Left sinus exhibits no maxillary sinus tenderness and no frontal sinus tenderness.   Mouth/Throat: Uvula is midline and mucous membranes are normal. No oral lesions. No trismus in the jaw. No uvula swelling. Oropharyngeal exudate (Postnasal drip present.) present. No posterior oropharyngeal edema, posterior oropharyngeal erythema or tonsillar abscesses.   Eyes: Pupils are equal, " round, and reactive to light. Conjunctivae and EOM are normal. No scleral icterus.   Neck: Trachea normal, normal range of motion and phonation normal. Neck supple. No JVD present. No thyroid mass and no thyromegaly present.   Cardiovascular: Normal rate, regular rhythm and normal heart sounds. Exam reveals no gallop and no friction rub.   No murmur heard.  Pulmonary/Chest: Effort normal and breath sounds normal. No respiratory distress. She has no wheezes.   Abdominal: Soft. Bowel sounds are normal. She exhibits no distension and no mass. There is no tenderness. There is no guarding.   Musculoskeletal: Normal range of motion. She exhibits no edema or deformity.   Lymphadenopathy:     She has no cervical adenopathy.   Neurological: She is alert and oriented to person, place, and time.   Skin: Skin is warm and dry. She is not diaphoretic.   Psychiatric: She has a normal mood and affect. Her behavior is normal. Judgment and thought content normal.   Nursing note and vitals reviewed.        Assessment:       1. Screening for colorectal cancer    2. Screening mammogram, encounter for    3. Chronic non-seasonal allergic rhinitis    4. Need for Tdap vaccination    5. Need for shingles vaccine    6. Obesity (BMI 30-39.9)          Plan:   Screening for colorectal cancer  -     Fecal Immunochemical Test (iFOBT); Future; Expected date: 12/11/2019    Screening mammogram, encounter for  -     Mammo Digital Screening Bilat w/ Cliff; Future; Expected date: 12/11/2019    Chronic non-seasonal allergic rhinitis  -     montelukast (SINGULAIR) 10 mg tablet; Take 1 tablet (10 mg total) by mouth every evening.  Dispense: 30 tablet; Refill: 0    Need for Tdap vaccination  -     Tdap Vaccine    Need for shingles vaccine  -     Zoster Recombinant Vaccine    Obesity (BMI 30-39.9)      Follow up in about 1 year (around 6/25/2020).    Thalia appears to be stable and doing well today with exception of some chronic allergic rhinitis symptoms  which flared with recent outdoor exposure.  I discussed possible treatment options with her and highly encouraged her to continue with use of nasal saline washes as frequently as possible.  I discussed use of antihistamines including Allegra, Zyrtec and Claritin with risks and benefits but she does not believe she has tried any of these in the past.  I discussed use of singular with risks and benefits and she believes she may have use generic montelukast previously with good results without any adverse effects.  She was interested in a prescription for this today and I have placed this order for her.  I also discussed use of nasal steroids including Flonase or Nasonex with risks and benefits advised her that all of the products we discussed today could be used in a stepwise approach to control her symptoms completely.  I did offer quick follow-up for recheck but she was not interested in this as she believes her symptoms will be well controlled with singular alone and use of nasal saline washes if necessary.  If they are not happy to see her back at any time for this.  I did review health maintenance issues with her and reviewed the risks and benefits of the Tdap and Shingrix vaccines in detail.  She was interested in both of these today.  I did discuss colon cancer screening and recommended colonoscopy is the gold standard although she was very resistant to this testing.  I discussed alternatives with risks and benefits and she was interested in completing a fit kit and understands that if this is positive she will have to complete a diagnostic colonoscopy which may have higher cost.  I also discussed breast cancer screening and she was very interested in getting up-to-date with mammogram.  I have placed this order for her today.  I discussed her current weight and BMI and chronic health risks of obesity and she advised me that she has been thinking about losing weight but has not been motivated enough to make  changes yet.  I discussed her current diet and exercise routine and made a multitude of suggestions for lower calorie, higher fiber and more nutritious options that could be substituted in for less healthy foods.  I also highly recommended watching portion sizes closely and we discussed keeping a diet diary and exercise diary.  We also discussed the 10,000 steps program and a multitude of very low impact aerobic exercises she could try that will hopefully be tolerated with her back pains and plantar fasciitis history.  I advised her to think about we discussed and the start making changes slowly to make these a lifestyle modification and not a diet which can become intolerable and short appeared of time.  I also briefly discussed possible weight loss medications with her but she had no interest in these.  I also offered a quick follow-up to make sure she is doing well with making some changes but she had no interest in this and wanted to be seen back yearly as long as she continues to do well with allergy symptoms resolving.  I am fine with this and happy to see her back at any time if any issues arise or allergy symptoms do not improve and resolve as expected.

## 2019-11-19 ENCOUNTER — HOSPITAL ENCOUNTER (OUTPATIENT)
Dept: RADIOLOGY | Facility: CLINIC | Age: 49
Discharge: HOME | End: 2019-11-19
Attending: FAMILY MEDICINE
Payer: COMMERCIAL

## 2019-11-19 VITALS — WEIGHT: 184.75 LBS | BODY MASS INDEX: 32.73 KG/M2 | HEIGHT: 63 IN

## 2019-11-19 DIAGNOSIS — Z12.31 SCREENING MAMMOGRAM, ENCOUNTER FOR: ICD-10-CM

## 2019-11-19 PROCEDURE — 77067 SCR MAMMO BI INCL CAD: CPT | Mod: 26,,, | Performed by: RADIOLOGY

## 2019-11-19 PROCEDURE — 77067 MAMMO DIGITAL SCREENING BILAT WITH TOMOSYNTHESIS_CAD: ICD-10-PCS | Mod: 26,,, | Performed by: RADIOLOGY

## 2019-11-19 PROCEDURE — 77063 MAMMO DIGITAL SCREENING BILAT WITH TOMOSYNTHESIS_CAD: ICD-10-PCS | Mod: 26,,, | Performed by: RADIOLOGY

## 2019-11-19 PROCEDURE — 77063 BREAST TOMOSYNTHESIS BI: CPT | Mod: 26,,, | Performed by: RADIOLOGY

## 2019-11-19 PROCEDURE — 77067 SCR MAMMO BI INCL CAD: CPT | Mod: TC,PO

## 2019-11-27 ENCOUNTER — PATIENT MESSAGE (OUTPATIENT)
Dept: FAMILY MEDICINE | Facility: CLINIC | Age: 49
End: 2019-11-27

## 2020-04-21 DIAGNOSIS — Z01.84 ANTIBODY RESPONSE EXAMINATION: ICD-10-CM

## 2020-04-24 ENCOUNTER — LAB VISIT (OUTPATIENT)
Dept: LAB | Facility: HOSPITAL | Age: 50
End: 2020-04-24
Attending: INTERNAL MEDICINE
Payer: COMMERCIAL

## 2020-04-24 DIAGNOSIS — Z01.84 ANTIBODY RESPONSE EXAMINATION: ICD-10-CM

## 2020-04-24 LAB — SARS-COV-2 IGG SERPL QL IA: NEGATIVE

## 2020-04-24 PROCEDURE — 36415 COLL VENOUS BLD VENIPUNCTURE: CPT

## 2020-04-24 PROCEDURE — 86769 SARS-COV-2 COVID-19 ANTIBODY: CPT

## 2020-05-13 DIAGNOSIS — M54.50 CHRONIC MIDLINE LOW BACK PAIN WITHOUT SCIATICA: Primary | ICD-10-CM

## 2020-05-13 DIAGNOSIS — G89.29 CHRONIC MIDLINE LOW BACK PAIN WITHOUT SCIATICA: Primary | ICD-10-CM

## 2020-05-13 RX ORDER — BACLOFEN 10 MG/1
10 TABLET ORAL NIGHTLY PRN
Qty: 30 TABLET | Refills: 0 | Status: SHIPPED | OUTPATIENT
Start: 2020-05-13 | End: 2021-08-24

## 2020-05-13 RX ORDER — MELOXICAM 15 MG/1
15 TABLET ORAL DAILY PRN
Qty: 30 TABLET | Refills: 0 | Status: SHIPPED | OUTPATIENT
Start: 2020-05-13 | End: 2020-07-17 | Stop reason: ALTCHOICE

## 2020-05-18 ENCOUNTER — HOSPITAL ENCOUNTER (OUTPATIENT)
Dept: RADIOLOGY | Facility: HOSPITAL | Age: 50
Discharge: HOME | End: 2020-05-18
Attending: PHYSICAL MEDICINE & REHABILITATION
Payer: COMMERCIAL

## 2020-05-18 ENCOUNTER — OFFICE VISIT (OUTPATIENT)
Dept: SPINE | Facility: CLINIC | Age: 50
End: 2020-05-18
Payer: COMMERCIAL

## 2020-05-18 VITALS — HEIGHT: 63 IN | BODY MASS INDEX: 32.73 KG/M2 | WEIGHT: 184.75 LBS

## 2020-05-18 DIAGNOSIS — M25.551 PAIN OF RIGHT HIP JOINT: ICD-10-CM

## 2020-05-18 DIAGNOSIS — M25.551 PAIN OF RIGHT HIP JOINT: Primary | ICD-10-CM

## 2020-05-18 PROCEDURE — 73502 X-RAY EXAM HIP UNI 2-3 VIEWS: CPT | Mod: 26,RT,, | Performed by: RADIOLOGY

## 2020-05-18 PROCEDURE — 73502 X-RAY EXAM HIP UNI 2-3 VIEWS: CPT | Mod: TC,FY,RT

## 2020-05-18 PROCEDURE — 99213 PR OFFICE/OUTPT VISIT, EST, LEVL III, 20-29 MIN: ICD-10-PCS | Mod: S$GLB,,, | Performed by: PHYSICAL MEDICINE & REHABILITATION

## 2020-05-18 PROCEDURE — 3008F PR BODY MASS INDEX (BMI) DOCUMENTED: ICD-10-PCS | Mod: S$GLB,,, | Performed by: PHYSICAL MEDICINE & REHABILITATION

## 2020-05-18 PROCEDURE — 73502 XR HIP 2 VIEW RIGHT: ICD-10-PCS | Mod: 26,RT,, | Performed by: RADIOLOGY

## 2020-05-18 PROCEDURE — 3008F BODY MASS INDEX DOCD: CPT | Mod: S$GLB,,, | Performed by: PHYSICAL MEDICINE & REHABILITATION

## 2020-05-18 PROCEDURE — 99213 OFFICE O/P EST LOW 20 MIN: CPT | Mod: S$GLB,,, | Performed by: PHYSICAL MEDICINE & REHABILITATION

## 2020-05-18 NOTE — PROGRESS NOTES
SUBJECTIVE:    Patient ID: Joselin Phillips is a 49 y.o. female.    Chief Complaint: No chief complaint on file.    She presents today with a 1 month history of right groin pain.  Still has some low back pain at the lumbosacral junction but the primary complaint is in the right groin.  Hurts to bear weight on the right leg.  She has no radicular leg pain or weakness.  No bowel or bladder problems.  She has been using Tylenol and meloxicam as needed for pain.  She had no injury to the hip.  Not on steroids.  Denies any other joint problems        Past Medical History:   Diagnosis Date    Asthma      Social History     Socioeconomic History    Marital status:      Spouse name: Not on file    Number of children: Not on file    Years of education: Not on file    Highest education level: Not on file   Occupational History    Not on file   Social Needs    Financial resource strain: Not on file    Food insecurity:     Worry: Not on file     Inability: Not on file    Transportation needs:     Medical: Not on file     Non-medical: Not on file   Tobacco Use    Smoking status: Never Smoker   Substance and Sexual Activity    Alcohol use: No    Drug use: Never    Sexual activity: Yes   Lifestyle    Physical activity:     Days per week: Not on file     Minutes per session: Not on file    Stress: Not on file   Relationships    Social connections:     Talks on phone: Not on file     Gets together: Not on file     Attends Oriental orthodox service: Not on file     Active member of club or organization: Not on file     Attends meetings of clubs or organizations: Not on file     Relationship status: Not on file   Other Topics Concern    Not on file   Social History Narrative    Not on file     Past Surgical History:   Procedure Laterality Date    FOOT SURGERY      TUBAL LIGATION Bilateral      Family History   Problem Relation Age of Onset    Heart disease Father     Hypertension Father     Kidney disease  "Father      Vitals:    05/18/20 1349   Weight: 83.8 kg (184 lb 11.9 oz)   Height: 5' 2.99" (1.6 m)       Review of Systems   Constitutional: Negative for chills, diaphoresis, fatigue, fever and unexpected weight change.   HENT: Negative for trouble swallowing.    Eyes: Negative for visual disturbance.   Respiratory: Negative for shortness of breath.    Cardiovascular: Negative for chest pain.   Gastrointestinal: Negative for abdominal pain, constipation, nausea and vomiting.   Genitourinary: Negative for difficulty urinating.   Musculoskeletal: Negative for arthralgias, back pain, gait problem, joint swelling, myalgias, neck pain and neck stiffness.   Neurological: Negative for dizziness, speech difficulty, weakness, light-headedness, numbness and headaches.          Objective:      Physical Exam   Constitutional: She is oriented to person, place, and time. She appears well-developed and well-nourished.   Neurological: She is alert and oriented to person, place, and time.   She is awake and in no acute distress  No point tenderness or palpable masses about the lumbar spine  No pain over the trochanters  Deep tendon reflexes +2 at both knees and +1 at both ankles  Strength is normal in both lower extremities  Straight leg raising negative bilaterally  MARJORIE testing on the right causes right groin pain.  MARJORIE testing on left is negative           Assessment:       1. Pain of right hip joint           Plan:     her symptoms in examination are most consistent with an intrinsic right hip problem.  Going to get some baseline x-rays.  Possible referred pain from the lumbar spine where she has known lumbar degenerative disc disease.  Consider MRI of the lumbar spine and epidural injections.  Continue Tylenol and meloxicam as needed.  Follow-up in about 1 week or as needed      Pain of right hip joint  -     X-Ray Hip 2 or 3 views Right; Future; Expected date: 05/18/2020        "

## 2020-05-19 ENCOUNTER — TELEPHONE (OUTPATIENT)
Dept: SPINE | Facility: CLINIC | Age: 50
End: 2020-05-19

## 2020-05-19 DIAGNOSIS — M54.50 ACUTE RIGHT-SIDED LOW BACK PAIN, UNSPECIFIED WHETHER SCIATICA PRESENT: ICD-10-CM

## 2020-05-19 DIAGNOSIS — M25.551 PAIN OF RIGHT HIP JOINT: ICD-10-CM

## 2020-05-19 DIAGNOSIS — M54.50 RIGHT LOW BACK PAIN, UNSPECIFIED CHRONICITY, UNSPECIFIED WHETHER SCIATICA PRESENT: Primary | ICD-10-CM

## 2020-05-26 ENCOUNTER — HOSPITAL ENCOUNTER (OUTPATIENT)
Dept: RADIOLOGY | Facility: HOSPITAL | Age: 50
Discharge: HOME | End: 2020-05-26
Attending: PHYSICAL MEDICINE & REHABILITATION
Payer: COMMERCIAL

## 2020-05-26 DIAGNOSIS — M54.50 ACUTE RIGHT-SIDED LOW BACK PAIN, UNSPECIFIED WHETHER SCIATICA PRESENT: ICD-10-CM

## 2020-05-26 PROCEDURE — 72148 MRI LUMBAR SPINE WITHOUT CONTRAST: ICD-10-PCS | Mod: 26,,, | Performed by: RADIOLOGY

## 2020-05-26 PROCEDURE — 72148 MRI LUMBAR SPINE W/O DYE: CPT | Mod: TC

## 2020-05-26 PROCEDURE — 72148 MRI LUMBAR SPINE W/O DYE: CPT | Mod: 26,,, | Performed by: RADIOLOGY

## 2020-05-28 ENCOUNTER — OFFICE VISIT (OUTPATIENT)
Dept: SPINE | Facility: CLINIC | Age: 50
End: 2020-05-28
Payer: COMMERCIAL

## 2020-05-28 VITALS
DIASTOLIC BLOOD PRESSURE: 71 MMHG | HEART RATE: 94 BPM | HEIGHT: 63 IN | WEIGHT: 184.75 LBS | SYSTOLIC BLOOD PRESSURE: 113 MMHG | BODY MASS INDEX: 32.73 KG/M2

## 2020-05-28 DIAGNOSIS — M25.551 PAIN OF RIGHT HIP JOINT: Primary | ICD-10-CM

## 2020-05-28 PROCEDURE — 99213 PR OFFICE/OUTPT VISIT, EST, LEVL III, 20-29 MIN: ICD-10-PCS | Mod: S$GLB,,, | Performed by: PHYSICAL MEDICINE & REHABILITATION

## 2020-05-28 PROCEDURE — 3008F PR BODY MASS INDEX (BMI) DOCUMENTED: ICD-10-PCS | Mod: S$GLB,,, | Performed by: PHYSICAL MEDICINE & REHABILITATION

## 2020-05-28 PROCEDURE — 3008F BODY MASS INDEX DOCD: CPT | Mod: S$GLB,,, | Performed by: PHYSICAL MEDICINE & REHABILITATION

## 2020-05-28 PROCEDURE — 99213 OFFICE O/P EST LOW 20 MIN: CPT | Mod: S$GLB,,, | Performed by: PHYSICAL MEDICINE & REHABILITATION

## 2020-05-28 RX ORDER — METHYLPREDNISOLONE 4 MG/1
TABLET ORAL
Qty: 1 PACKAGE | Refills: 0 | Status: SHIPPED | OUTPATIENT
Start: 2020-05-28 | End: 2020-06-18

## 2020-05-28 NOTE — PROGRESS NOTES
SUBJECTIVE:    Patient ID: Joselin Phillips is a 49 y.o. female.    Chief Complaint: Follow-up (mri)    She is here to review her lumbar MRI which was done on 05/26/2020 to evaluate her complaint of right groin pain.  The MRI is summarized below:    Impression      Mild multilevel degenerative changes of the lower lumbar spine, as described above, most pronounced at L4-5 and contribute to mild-to-moderate bilateral neural foraminal narrowing and mild spinal canal stenosis with lateral recess stenosis bilaterally.  Circumferential disc bulge with left subarticular disc protrusion at L5-S1, with possible abutment of bilateral descending S1 nerve roots.    Clinically I think she is a bit improved.  Pain level is only 4/10 at this time.  Last time I saw her was about at 8/10.  Still complaining of right groin pain.  Occasional right posterior low back pain.  Definitely no radicular symptoms a nothing referable to the S1 nerve root.  She feels like taking Mobic daily has helped        Past Medical History:   Diagnosis Date    Asthma      Social History     Socioeconomic History    Marital status:      Spouse name: Not on file    Number of children: Not on file    Years of education: Not on file    Highest education level: Not on file   Occupational History    Not on file   Social Needs    Financial resource strain: Not on file    Food insecurity:     Worry: Not on file     Inability: Not on file    Transportation needs:     Medical: Not on file     Non-medical: Not on file   Tobacco Use    Smoking status: Never Smoker   Substance and Sexual Activity    Alcohol use: No    Drug use: Never    Sexual activity: Yes   Lifestyle    Physical activity:     Days per week: Not on file     Minutes per session: Not on file    Stress: Not on file   Relationships    Social connections:     Talks on phone: Not on file     Gets together: Not on file     Attends Islam service: Not on file     Active member  "of club or organization: Not on file     Attends meetings of clubs or organizations: Not on file     Relationship status: Not on file   Other Topics Concern    Not on file   Social History Narrative    Not on file     Past Surgical History:   Procedure Laterality Date    FOOT SURGERY      TUBAL LIGATION Bilateral      Family History   Problem Relation Age of Onset    Heart disease Father     Hypertension Father     Kidney disease Father      Vitals:    05/28/20 1317   BP: 113/71   Pulse: 94   Weight: 83.8 kg (184 lb 11.9 oz)   Height: 5' 2.99" (1.6 m)       Review of Systems   Constitutional: Negative for chills, diaphoresis, fatigue, fever and unexpected weight change.   HENT: Negative for trouble swallowing.    Eyes: Negative for visual disturbance.   Respiratory: Negative for shortness of breath.    Cardiovascular: Negative for chest pain.   Gastrointestinal: Negative for abdominal pain, constipation, nausea and vomiting.   Genitourinary: Negative for difficulty urinating.   Musculoskeletal: Negative for arthralgias, back pain, gait problem, joint swelling, myalgias, neck pain and neck stiffness.   Neurological: Negative for dizziness, speech difficulty, weakness, light-headedness, numbness and headaches.          Objective:      Physical Exam   Constitutional: She is oriented to person, place, and time. She appears well-developed and well-nourished.   Neurological: She is alert and oriented to person, place, and time.           Assessment:       1. Pain of right hip joint           Plan:     she has right groin pain of uncertain etiology.  The MRI does not really help me in this case.  I do not see a cause for right groin pain base off that study.  X-rays of the hip were normal as well.  Still I favor in intrinsic hip pathology.  I am going to start on a Medrol Dosepak and some physical therapy.  She is to hold the Mobic while she is on the steroids.  Follow-up with me in 6 weeks or as needed.  Consider " formal referral to Orthopedics      Pain of right hip joint

## 2020-06-29 DIAGNOSIS — Z12.11 COLON CANCER SCREENING: ICD-10-CM

## 2020-07-15 ENCOUNTER — PATIENT OUTREACH (OUTPATIENT)
Dept: ADMINISTRATIVE | Facility: HOSPITAL | Age: 50
End: 2020-07-15

## 2020-07-15 NOTE — PROGRESS NOTES
Chart review completed 07/15/2020.  Care Everywhere updates requested and reviewed.  Immunizations reconciled. Media reports reviewed.  Duplicate HM overrides and  orders removed.  Overdue HM topic chart audit and/or requested.  Overdue lab testing linked to upcoming lab appointments if applies.    Lab won, and [facility] reviewed   Pap Smear and Lab testing       Health Maintenance Due   Topic Date Due    HIV Screening  1983    Colorectal Cancer Screening  2018    Shingles Vaccine (2 of 2) 2019    Cervical Cancer Screening  2020

## 2020-07-17 ENCOUNTER — OFFICE VISIT (OUTPATIENT)
Dept: FAMILY MEDICINE | Facility: CLINIC | Age: 50
End: 2020-07-17
Payer: COMMERCIAL

## 2020-07-17 VITALS
DIASTOLIC BLOOD PRESSURE: 62 MMHG | HEIGHT: 62 IN | TEMPERATURE: 98 F | RESPIRATION RATE: 18 BRPM | HEART RATE: 89 BPM | SYSTOLIC BLOOD PRESSURE: 118 MMHG | WEIGHT: 183.63 LBS | BODY MASS INDEX: 33.79 KG/M2 | OXYGEN SATURATION: 98 %

## 2020-07-17 DIAGNOSIS — Z11.4 SCREENING FOR HIV (HUMAN IMMUNODEFICIENCY VIRUS): ICD-10-CM

## 2020-07-17 DIAGNOSIS — E66.9 OBESITY (BMI 30-39.9): ICD-10-CM

## 2020-07-17 DIAGNOSIS — J30.9 CHRONIC ALLERGIC RHINITIS: ICD-10-CM

## 2020-07-17 DIAGNOSIS — Z00.00 WELLNESS EXAMINATION: Primary | ICD-10-CM

## 2020-07-17 DIAGNOSIS — Z13.220 SCREENING FOR LIPID DISORDERS: ICD-10-CM

## 2020-07-17 DIAGNOSIS — Z12.31 SCREENING MAMMOGRAM, ENCOUNTER FOR: ICD-10-CM

## 2020-07-17 PROCEDURE — 99999 PR PBB SHADOW E&M-EST. PATIENT-LVL IV: CPT | Mod: PBBFAC,,, | Performed by: FAMILY MEDICINE

## 2020-07-17 PROCEDURE — 99396 PR PREVENTIVE VISIT,EST,40-64: ICD-10-PCS | Mod: S$PBB,,, | Performed by: FAMILY MEDICINE

## 2020-07-17 PROCEDURE — 99999 PR PBB SHADOW E&M-EST. PATIENT-LVL IV: ICD-10-PCS | Mod: PBBFAC,,, | Performed by: FAMILY MEDICINE

## 2020-07-17 PROCEDURE — 99396 PREV VISIT EST AGE 40-64: CPT | Mod: S$PBB,,, | Performed by: FAMILY MEDICINE

## 2020-07-17 RX ORDER — MONTELUKAST SODIUM 10 MG/1
10 TABLET ORAL NIGHTLY
Qty: 90 TABLET | Refills: 3 | Status: SHIPPED | OUTPATIENT
Start: 2020-07-17 | End: 2020-08-16

## 2020-07-17 NOTE — PROGRESS NOTES
Subjective:       Patient ID: Joselin Phillips is a 49 y.o. female.    Chief Complaint: Annual Exam    HPI   Patient presents for her annual wellness physical.  She tells me she is pretty good today and has no new concerns or complaints initially.  Upon questioning though she reports some mild nasal congestion and would like to restart singular which worked well for symptoms when present.  Symptoms are sporadic and mild but are year round.  She has pains currently and reports she has baclofen as needed for these if necessary but they resolve months ago with a short course of steroids.      Review of Systems   Constitutional: Negative for activity change, appetite change, chills, fatigue, fever and unexpected weight change.   HENT: Positive for congestion. Negative for ear discharge, ear pain, hearing loss, postnasal drip, rhinorrhea, sinus pressure, sinus pain, sneezing, sore throat, trouble swallowing and voice change.    Eyes: Negative for visual disturbance.   Respiratory: Negative for cough, chest tightness, shortness of breath and wheezing.    Cardiovascular: Negative for chest pain, palpitations and leg swelling.   Gastrointestinal: Negative for abdominal pain, blood in stool, constipation, diarrhea, nausea and vomiting.   Genitourinary: Negative for difficulty urinating, dysuria, flank pain and frequency.   Musculoskeletal: Negative for arthralgias, back pain, gait problem, joint swelling, myalgias, neck pain and neck stiffness.   Skin: Negative for rash and wound.   Neurological: Negative for dizziness, tremors, syncope, weakness, light-headedness, numbness and headaches.   Hematological: Negative for adenopathy. Does not bruise/bleed easily.   Psychiatric/Behavioral: Negative for dysphoric mood and sleep disturbance. The patient is not nervous/anxious.          Objective:      Vitals:    07/17/20 1306   BP: 118/62   Pulse: 89   Resp: 18   Temp: 98.4 °F (36.9 °C)   TempSrc: Oral   SpO2: 98%   Weight:  "83.3 kg (183 lb 10.3 oz)   Height: 5' 2" (1.575 m)   PainSc: 0-No pain     Physical Exam  Vitals signs and nursing note reviewed.   Constitutional:       General: She is not in acute distress.     Appearance: She is well-developed. She is not diaphoretic.   HENT:      Head: Normocephalic and atraumatic.      Jaw: There is normal jaw occlusion.      Right Ear: Hearing, tympanic membrane, ear canal and external ear normal.      Left Ear: Hearing, tympanic membrane, ear canal and external ear normal.      Nose: Nose normal.      Right Turbinates: Swollen. Not enlarged or pale.      Left Turbinates: Swollen. Not enlarged or pale.      Right Sinus: No maxillary sinus tenderness or frontal sinus tenderness.      Left Sinus: No maxillary sinus tenderness or frontal sinus tenderness.      Mouth/Throat:      Mouth: No oral lesions.      Pharynx: Oropharynx is clear.      Tonsils: No tonsillar exudate or tonsillar abscesses.   Eyes:      General: No scleral icterus.     Conjunctiva/sclera: Conjunctivae normal.      Pupils: Pupils are equal, round, and reactive to light.   Neck:      Musculoskeletal: Normal range of motion and neck supple.      Thyroid: No thyroid mass or thyromegaly.      Vascular: No JVD.      Trachea: Trachea and phonation normal.   Cardiovascular:      Rate and Rhythm: Normal rate and regular rhythm.      Heart sounds: Normal heart sounds. No murmur. No friction rub. No gallop.    Pulmonary:      Effort: Pulmonary effort is normal. No respiratory distress.      Breath sounds: Normal breath sounds. No wheezing.   Chest:      Chest wall: No tenderness.   Abdominal:      General: Bowel sounds are normal. There is no distension.      Palpations: Abdomen is soft. There is no mass.      Tenderness: There is no abdominal tenderness. There is no guarding or rebound.   Musculoskeletal: Normal range of motion.         General: No tenderness or deformity.   Lymphadenopathy:      Cervical: No cervical adenopathy. "   Skin:     General: Skin is warm and dry.   Neurological:      Mental Status: She is alert and oriented to person, place, and time.   Psychiatric:         Behavior: Behavior normal.         Thought Content: Thought content normal.         Judgment: Judgment normal.           Assessment:       1. Wellness examination    2. Screening for lipid disorders    3. Screening for HIV (human immunodeficiency virus)    4. Screening mammogram, encounter for    5. Chronic allergic rhinitis    6. Obesity (BMI 30-39.9)          Plan:   Wellness examination  -     CBC auto differential; Future; Expected date: 07/17/2020  -     Comprehensive metabolic panel; Future; Expected date: 07/17/2020  -     Lipid Panel; Future; Expected date: 07/17/2020  -     TSH; Future; Expected date: 07/17/2020    Screening for lipid disorders  -     Lipid Panel; Future; Expected date: 07/17/2020    Screening for HIV (human immunodeficiency virus)  -     HIV 1/2 Ag/Ab (4th Gen); Future; Expected date: 07/17/2020    Screening mammogram, encounter for  -     Mammo Digital Screening Bilat; Future; Expected date: 11/19/2020    Chronic allergic rhinitis  -     montelukast (SINGULAIR) 10 mg tablet; Take 1 tablet (10 mg total) by mouth every evening.  Dispense: 90 tablet; Refill: 3    Obesity (BMI 30-39.9)      Follow up in about 1 year (around 7/17/2021).    Thalia appears to be stable and doing today with exception of mild nasal congestion which previously resolve with use of singular.  Discussed the risks and benefits medication her again and she was very interested in restarting on this.  She is unsure if she would like to take it sporadically daily year round but I provided her day supply with 3 refills if she decides to take the medication continuously to resolve symptoms.  I discussed checking labs as above and she was interested in these including HIV screening.  She is not due for yearly mammogram yet but will be in November and I have provided her  with a postdated order to prevent her having to call for this.  Her biggest issue is still her weight and we again had a discussion about the chronic health risks of obesity and possible weight loss treatment options and supports.  She would still like to just work on improving her diet and becoming more active and I reviewed keeping a diet diary and exercise diary with her again.  Did offer follow-up for this but she would like to return at yearly interval as long as her labs show no significant abnormalities and she continues to do well.  I am fine with this and happy to see her back sooner if any problems.  I did recommend shingles vaccination but we do not have the vaccine in stock currently.  I advised her she could receive this at her pharmacy or call the office in a few weeks and when available return any time for this.

## 2020-07-24 ENCOUNTER — LAB VISIT (OUTPATIENT)
Dept: LAB | Facility: HOSPITAL | Age: 50
End: 2020-07-24
Attending: FAMILY MEDICINE
Payer: COMMERCIAL

## 2020-07-24 DIAGNOSIS — Z11.4 SCREENING FOR HIV (HUMAN IMMUNODEFICIENCY VIRUS): ICD-10-CM

## 2020-07-24 DIAGNOSIS — Z00.00 WELLNESS EXAMINATION: ICD-10-CM

## 2020-07-24 DIAGNOSIS — Z13.220 SCREENING FOR LIPID DISORDERS: ICD-10-CM

## 2020-07-24 LAB
ALBUMIN SERPL BCP-MCNC: 3.8 G/DL (ref 3.5–5.2)
ALP SERPL-CCNC: 64 U/L (ref 55–135)
ALT SERPL W/O P-5'-P-CCNC: 17 U/L (ref 10–44)
ANION GAP SERPL CALC-SCNC: 9 MMOL/L (ref 8–16)
AST SERPL-CCNC: 23 U/L (ref 10–40)
BASOPHILS # BLD AUTO: 0.05 K/UL (ref 0–0.2)
BASOPHILS NFR BLD: 0.7 % (ref 0–1.9)
BILIRUB SERPL-MCNC: 0.7 MG/DL (ref 0.1–1)
BUN SERPL-MCNC: 10 MG/DL (ref 6–20)
CALCIUM SERPL-MCNC: 9.8 MG/DL (ref 8.7–10.5)
CHLORIDE SERPL-SCNC: 104 MMOL/L (ref 95–110)
CHOLEST SERPL-MCNC: 246 MG/DL (ref 120–199)
CHOLEST/HDLC SERPL: 2.9 {RATIO} (ref 2–5)
CO2 SERPL-SCNC: 27 MMOL/L (ref 23–29)
CREAT SERPL-MCNC: 0.7 MG/DL (ref 0.5–1.4)
DIFFERENTIAL METHOD: ABNORMAL
EOSINOPHIL # BLD AUTO: 0.3 K/UL (ref 0–0.5)
EOSINOPHIL NFR BLD: 4.6 % (ref 0–8)
ERYTHROCYTE [DISTWIDTH] IN BLOOD BY AUTOMATED COUNT: 14.3 % (ref 11.5–14.5)
EST. GFR  (AFRICAN AMERICAN): >60 ML/MIN/1.73 M^2
EST. GFR  (NON AFRICAN AMERICAN): >60 ML/MIN/1.73 M^2
GLUCOSE SERPL-MCNC: 92 MG/DL (ref 70–110)
HCT VFR BLD AUTO: 41 % (ref 37–48.5)
HDLC SERPL-MCNC: 86 MG/DL (ref 40–75)
HDLC SERPL: 35 % (ref 20–50)
HGB BLD-MCNC: 12.8 G/DL (ref 12–16)
HIV 1+2 AB+HIV1 P24 AG SERPL QL IA: NEGATIVE
IMM GRANULOCYTES # BLD AUTO: 0.02 K/UL (ref 0–0.04)
IMM GRANULOCYTES NFR BLD AUTO: 0.3 % (ref 0–0.5)
LDLC SERPL CALC-MCNC: 149.2 MG/DL (ref 63–159)
LYMPHOCYTES # BLD AUTO: 3.4 K/UL (ref 1–4.8)
LYMPHOCYTES NFR BLD: 49.1 % (ref 18–48)
MCH RBC QN AUTO: 28.5 PG (ref 27–31)
MCHC RBC AUTO-ENTMCNC: 31.2 G/DL (ref 32–36)
MCV RBC AUTO: 91 FL (ref 82–98)
MONOCYTES # BLD AUTO: 0.9 K/UL (ref 0.3–1)
MONOCYTES NFR BLD: 12.9 % (ref 4–15)
NEUTROPHILS # BLD AUTO: 2.2 K/UL (ref 1.8–7.7)
NEUTROPHILS NFR BLD: 32.4 % (ref 38–73)
NONHDLC SERPL-MCNC: 160 MG/DL
NRBC BLD-RTO: 0 /100 WBC
PLATELET # BLD AUTO: 330 K/UL (ref 150–350)
PMV BLD AUTO: 9.5 FL (ref 9.2–12.9)
POTASSIUM SERPL-SCNC: 4.1 MMOL/L (ref 3.5–5.1)
PROT SERPL-MCNC: 8.4 G/DL (ref 6–8.4)
RBC # BLD AUTO: 4.49 M/UL (ref 4–5.4)
SODIUM SERPL-SCNC: 140 MMOL/L (ref 136–145)
TRIGL SERPL-MCNC: 54 MG/DL (ref 30–150)
TSH SERPL DL<=0.005 MIU/L-ACNC: 0.89 UIU/ML (ref 0.4–4)
WBC # BLD AUTO: 6.89 K/UL (ref 3.9–12.7)

## 2020-07-24 PROCEDURE — 36415 COLL VENOUS BLD VENIPUNCTURE: CPT

## 2020-07-24 PROCEDURE — 84443 ASSAY THYROID STIM HORMONE: CPT

## 2020-07-24 PROCEDURE — 85025 COMPLETE CBC W/AUTO DIFF WBC: CPT

## 2020-07-24 PROCEDURE — 86703 HIV-1/HIV-2 1 RESULT ANTBDY: CPT

## 2020-07-24 PROCEDURE — 80061 LIPID PANEL: CPT

## 2020-07-24 PROCEDURE — 80053 COMPREHEN METABOLIC PANEL: CPT

## 2020-07-31 ENCOUNTER — TELEPHONE (OUTPATIENT)
Dept: FAMILY MEDICINE | Facility: CLINIC | Age: 50
End: 2020-07-31

## 2020-07-31 NOTE — TELEPHONE ENCOUNTER
----- Message from Evangelina Gimenez MA sent at 7/31/2020  9:15 AM CDT -----  Type:  Test Results    Who Called:  Joselin  Name of Test (Lab/Mammo/Etc):  lab  Date of Test:  last week  Ordering Provider:  FLAKITO Palma  Where the test was performed:  [facility]  Best Call Back Number:  [phone]  Additional Information:

## 2020-09-14 ENCOUNTER — PATIENT OUTREACH (OUTPATIENT)
Dept: ADMINISTRATIVE | Facility: HOSPITAL | Age: 50
End: 2020-09-14

## 2020-09-14 NOTE — LETTER
September 21, 2020    Joselin Phillips  [address] [place] [address]             [facility]  [address]  [facility] [address]  Phone: [phone] Dear Joselin Phillips,     In addition to helping you feel better when you are sick, Pavan Palma, DO would like to ensure you are receiving the recommended preventive health screenings.  Your [facility] chart indicates you may be overdue for a Cervical Cancer Screening.  We encourage you to schedule your annual well woman exam by either calling the number below, or replying to this message.     If you completed a pap smear outside of [facility],  please let me know so that I can update your health record.       If you have any issues or would like assistance in scheduling your annual well woman exam, please do not hesitate to call the number below.     Sincerely,     Brianna De La Rosa LPN   Clinical Care Coordinator   [place] [place] [facility]   [place] [address]   P: [phone]   F: [phone]

## 2020-09-14 NOTE — PROGRESS NOTES
CERVICAL gap report review. HM, chart, care everywhere, media reviewed.    Lab [email or web address] reviewed: YES  PORTAL MESSAGE SENT

## 2020-10-23 ENCOUNTER — PATIENT MESSAGE (OUTPATIENT)
Dept: SPINE | Facility: CLINIC | Age: 50
End: 2020-10-23

## 2020-10-28 DIAGNOSIS — M25.551 PAIN OF RIGHT HIP JOINT: Primary | ICD-10-CM

## 2020-10-29 DIAGNOSIS — M25.551 RIGHT HIP PAIN: Primary | ICD-10-CM

## 2020-11-02 ENCOUNTER — HOSPITAL ENCOUNTER (OUTPATIENT)
Dept: RADIOLOGY | Facility: HOSPITAL | Age: 50
Discharge: HOME | End: 2020-11-02
Attending: ORTHOPAEDIC SURGERY
Payer: COMMERCIAL

## 2020-11-02 ENCOUNTER — OFFICE VISIT (OUTPATIENT)
Dept: ORTHOPEDICS | Facility: CLINIC | Age: 50
End: 2020-11-02
Payer: COMMERCIAL

## 2020-11-02 VITALS — BODY MASS INDEX: 33.68 KG/M2 | HEIGHT: 62 IN | RESPIRATION RATE: 16 BRPM | WEIGHT: 183 LBS

## 2020-11-02 DIAGNOSIS — M25.551 PAIN OF RIGHT HIP JOINT: Primary | ICD-10-CM

## 2020-11-02 DIAGNOSIS — M25.551 RIGHT HIP PAIN: ICD-10-CM

## 2020-11-02 PROCEDURE — 99999 PR PBB SHADOW E&M-EST. PATIENT-LVL III: CPT | Mod: PBBFAC,,, | Performed by: ORTHOPAEDIC SURGERY

## 2020-11-02 PROCEDURE — 99204 OFFICE O/P NEW MOD 45 MIN: CPT | Mod: 25,S$GLB,, | Performed by: ORTHOPAEDIC SURGERY

## 2020-11-02 PROCEDURE — 73502 XR HIP 2 VIEW RIGHT: ICD-10-PCS | Mod: 26,RT,, | Performed by: RADIOLOGY

## 2020-11-02 PROCEDURE — 73502 X-RAY EXAM HIP UNI 2-3 VIEWS: CPT | Mod: TC,PN,RT

## 2020-11-02 PROCEDURE — 99999 PR PBB SHADOW E&M-EST. PATIENT-LVL III: ICD-10-PCS | Mod: PBBFAC,,, | Performed by: ORTHOPAEDIC SURGERY

## 2020-11-02 PROCEDURE — 20611 DRAIN/INJ JOINT/BURSA W/US: CPT | Mod: RT,S$GLB,, | Performed by: ORTHOPAEDIC SURGERY

## 2020-11-02 PROCEDURE — 73502 X-RAY EXAM HIP UNI 2-3 VIEWS: CPT | Mod: 26,RT,, | Performed by: RADIOLOGY

## 2020-11-02 PROCEDURE — 20611 LARGE JOINT ASPIRATION/INJECTION: R HIP JOINT: ICD-10-PCS | Mod: RT,S$GLB,, | Performed by: ORTHOPAEDIC SURGERY

## 2020-11-02 PROCEDURE — 99204 PR OFFICE/OUTPT VISIT, NEW, LEVL IV, 45-59 MIN: ICD-10-PCS | Mod: 25,S$GLB,, | Performed by: ORTHOPAEDIC SURGERY

## 2020-11-02 RX ORDER — METHYLPREDNISOLONE ACETATE 40 MG/ML
40 INJECTION, SUSPENSION INTRA-ARTICULAR; INTRALESIONAL; INTRAMUSCULAR; SOFT TISSUE
Status: DISCONTINUED | OUTPATIENT
Start: 2020-11-02 | End: 2020-11-02 | Stop reason: HOSPADM

## 2020-11-02 RX ADMIN — METHYLPREDNISOLONE ACETATE 40 MG: 40 INJECTION, SUSPENSION INTRA-ARTICULAR; INTRALESIONAL; INTRAMUSCULAR; SOFT TISSUE at 01:11

## 2020-11-02 NOTE — PROGRESS NOTES
CC:  49-year-old female presents for evaluation of right hip pain.  The patient has had an insidious onset of pain in the right hip joint over the last month or so.  She has problems with back pain as well and is treated by Dr. Steel for that.  About a month ago she began having pain in the right groin area.  It is usually activity related and worse when she is up in bearing weight on the hip.  She does not recall any particular injury.  She is having some pain with getting out of a car and going from a seated to a standing position and vice versa.  She rates her pain as a 4/10.  When asked to point to her pain she points to her right groin and gives a positive C sign.    ROS:    Constitution: Denies chills, fever, and sweats.  HENT: Denies headaches or blurry vision.  Cardiovascular: Denies chest pain or irregular heart beat.  Respiratory: Denies cough or shortness of breath.  Gastrointestinal: Denies abdominal pain, nausea, or vomiting.  Genitourinary:  Denies urinary incontinence, bladder and kidney issues  Musculoskeletal:  Denies muscle cramps.  Positive for right hip pain  Neurological: Denies dizziness or focal weakness.  Psychiatric/Behavioral: Normal mental status.  Hematologic/Lymphatic: Denies bleeding problem or easy bruising/bleeding.  Skin: Denies rash or suspicious lesions.    Physical examination     Gen - No acute distress, well nourished, well groomed   Eyes - Extraoccular motions intact, pupils equally round and reactive to light and accommodation   ENT - normocephalic, atruamtic, oropharynx clear   Neck - Supple, no abnormal masses   Cardiovascular - regular rate and rhythm   Pulmonary - clear to auscultation bilaterally, no wheezes, ronchi, or rales   Abdomen - soft, non-tender, non-distended, positive bowel sounds   Psych - The patient is alert and oriented x3 with normal mood and affect    Examination of the Right Lower Extremity    Skin is intact throughout  Motor in intact  EHL,FHL,TA,nini  +2 DP/PT  Sensation LT intact D/P/1st    Examination of the Right Hip    C-Sign positive  Logroll negative  Stenchfield negative    Pain with ROM negative    ROM:    Flexion   120  Extension   30  Abduction   45  Adduction   20  External Rotation 45  Internal Rotation 35    Flexion contracture negative    FADIR negative  FADER negative    Tenderness to palpation over lateral and posterolateral greater tochanter negative    X-ray images were examined and personally interpreted by me.  Three views of the right hip dated 05/18/2020 show the femoral head well reduced within the acetabulum.  Joint space is well maintained with no acute fractures and no advanced arthritic changes.  Normal exam.    Dx:  Pain in the right hip.  The patient does not seem to have physical exam findings consistent with labral pathology.    Plan:  This point I think she does has a painful synovitis of the right hip.  I did offer to inject the hip joint for her under ultrasound and she agreed.    Recommendation is for an ultrasound guided steroid injection into the Right hip. Ultrasound guidance is needed due to the fact that the hip joint is deep in the body and not palpable. Risk and benefits of the procedure were explained to the patient. They verbalized understanding and did wish to proceed. Informed consent was obtained.  The Right hip was visualized under ultrasound. The femoral head, femoral neck, head-neck junction, and acetabulum were all visualized. The femoral neurovascular bundle was identified and avoided. Under direct ultrasound visualization, an 18 gauge spinal needle was advanced to an appropriate spot at the femoral head neck junction and the Right hip was injected with a mixture of 2/2/1 lidocaine, marcaine, and depomedrol. The hip capsule could be seen to distend as the fluid was injected. The patient tolerated the procedure well.  Static ultrasound images were saved to the patients chart.    Follow-up in 1  week.  If the patient does not get relief with the injection we may consider MRI arthrogram of the right hip.    Answers for HPI/ROS submitted by the patient on 11/2/2020   Hip pain  unexpected weight change: No  appetite change : No  sleep disturbance: No  IMMUNOCOMPROMISED: No  nervous/ anxious: No  dysphoric mood: No  rash: No  visual disturbance: No  eye redness: No  eye pain: No  ear pain: No  tinnitus: No  hearing loss: No  sinus pressure : No  nosebleeds: No  enviro allergies: No  food allergies: No  cough: No  shortness of breath: No  sweating: No  dysuria: No  frequency: No  difficulty urinating: No  hematuria: No  painful intercourse: No  chest pain: No  palpitations: No  nausea: No  vomiting: No  diarrhea: No  blood in stool: No  constipation: No  headaches: No  dizziness: No  numbness: No  seizures: No  joint swelling: No  myalgia: Yes  back pain: Yes  Pain Chronicity: recurrent  History of trauma: No  Onset: more than 1 month ago  Frequency: constantly  Progression since onset: unchanged  injury location: at work  pain- numeric: 7/10  pain location: groin  pain quality: sharp  Radiating Pain: No  Aggravating factors: walking, sitting  fever: No  inability to bear weight: No  itching: No  joint locking: No  limited range of motion: No  stiffness: Yes  tingling: No  Treatments tried: OTC pain meds  physical therapy: not tried  Improvement on treatment: no relief

## 2020-11-02 NOTE — LETTER
November 2, 2020      Francis Steel MD  [address] [place] [address] [address]  [facility] [address]           [facility]  [address] [place] [address] [address]  [facility] [address]  Phone: [phone]          Patient: Joselin Phillips   MR Number: [id number]   YOB: 1970   Date of Visit: 11/2/2020       Dear Dr. Francis Steel:    Thank you for referring Joselin Phillips to me for evaluation. Attached you will find relevant portions of my assessment and plan of care.    If you have questions, please do not hesitate to call me. I look forward to following Joselin Phillips along with you.    Sincerely,    Ed Juan II, MD    Enclosure  CC:  No Recipients    If you would like to receive this communication electronically, please contact [email or web address] or [phone] to request more information on [employer] access.    For providers and/or their staff who would like to refer a patient to [facility], please contact us through our one-stop-shop provider referral line, [facility], at [phone].    If you feel you have received this communication in error or would no longer like to receive these types of communications, please e-mail [email or web address]

## 2020-11-02 NOTE — PROCEDURES
Large Joint Aspiration/Injection: R hip joint    Date/Time: 11/2/2020 1:30 PM  Performed by: Ed Juan II, MD  Authorized by: Ed Juan II, MD     Consent Done?:  Yes (Verbal)  Indications:  Pain  Timeout: prior to procedure the correct patient, procedure, and site was verified    Prep: patient was prepped and draped in usual sterile fashion    Local anesthesia used?: No      Details:  Needle Size:  22 G  Ultrasonic Guidance for needle placement?: Yes    Images are saved and documented.  Approach:  Anterolateral  Location:  Hip  Site:  R hip joint  Medications:  40 mg methylPREDNISolone acetate 40 mg/mL  Patient tolerance:  Patient tolerated the procedure well with no immediate complications

## 2020-11-19 ENCOUNTER — HOSPITAL ENCOUNTER (OUTPATIENT)
Dept: RADIOLOGY | Facility: HOSPITAL | Age: 50
Discharge: HOME | End: 2020-11-19
Attending: FAMILY MEDICINE
Payer: COMMERCIAL

## 2020-11-19 DIAGNOSIS — Z12.31 SCREENING MAMMOGRAM, ENCOUNTER FOR: ICD-10-CM

## 2020-11-19 PROCEDURE — 77067 MAMMO DIGITAL SCREENING BILAT WITH TOMO: ICD-10-PCS | Mod: 26,,, | Performed by: RADIOLOGY

## 2020-11-19 PROCEDURE — 77063 BREAST TOMOSYNTHESIS BI: CPT | Mod: 26,,, | Performed by: RADIOLOGY

## 2020-11-19 PROCEDURE — 77067 SCR MAMMO BI INCL CAD: CPT | Mod: 26,,, | Performed by: RADIOLOGY

## 2020-11-19 PROCEDURE — 77067 SCR MAMMO BI INCL CAD: CPT | Mod: TC

## 2020-11-19 PROCEDURE — 77063 MAMMO DIGITAL SCREENING BILAT WITH TOMO: ICD-10-PCS | Mod: 26,,, | Performed by: RADIOLOGY

## 2020-12-23 ENCOUNTER — IMMUNIZATION (OUTPATIENT)
Dept: PRIMARY CARE CLINIC | Facility: CLINIC | Age: 50
End: 2020-12-23
Payer: COMMERCIAL

## 2020-12-23 DIAGNOSIS — Z23 NEED FOR VACCINATION: ICD-10-CM

## 2020-12-23 PROCEDURE — 0001A COVID-19, MRNA, LNP-S, PF, 30 MCG/0.3 ML DOSE VACCINE: ICD-10-PCS | Mod: CV19,S$GLB,, | Performed by: FAMILY MEDICINE

## 2020-12-23 PROCEDURE — 0001A COVID-19, MRNA, LNP-S, PF, 30 MCG/0.3 ML DOSE VACCINE: CPT | Mod: CV19,S$GLB,, | Performed by: FAMILY MEDICINE

## 2020-12-23 PROCEDURE — 91300 COVID-19, MRNA, LNP-S, PF, 30 MCG/0.3 ML DOSE VACCINE: ICD-10-PCS | Mod: S$GLB,,, | Performed by: FAMILY MEDICINE

## 2020-12-23 PROCEDURE — 91300 COVID-19, MRNA, LNP-S, PF, 30 MCG/0.3 ML DOSE VACCINE: CPT | Mod: S$GLB,,, | Performed by: FAMILY MEDICINE

## 2021-01-15 ENCOUNTER — IMMUNIZATION (OUTPATIENT)
Dept: PRIMARY CARE CLINIC | Facility: CLINIC | Age: 51
End: 2021-01-15
Payer: COMMERCIAL

## 2021-01-15 DIAGNOSIS — Z23 NEED FOR VACCINATION: Primary | ICD-10-CM

## 2021-01-15 PROCEDURE — 91300 COVID-19, MRNA, LNP-S, PF, 30 MCG/0.3 ML DOSE VACCINE: CPT | Mod: S$GLB,,, | Performed by: NURSE PRACTITIONER

## 2021-01-15 PROCEDURE — 0002A COVID-19, MRNA, LNP-S, PF, 30 MCG/0.3 ML DOSE VACCINE: ICD-10-PCS | Mod: S$GLB,,, | Performed by: NURSE PRACTITIONER

## 2021-01-15 PROCEDURE — 0002A COVID-19, MRNA, LNP-S, PF, 30 MCG/0.3 ML DOSE VACCINE: CPT | Mod: S$GLB,,, | Performed by: NURSE PRACTITIONER

## 2021-01-15 PROCEDURE — 91300 COVID-19, MRNA, LNP-S, PF, 30 MCG/0.3 ML DOSE VACCINE: ICD-10-PCS | Mod: S$GLB,,, | Performed by: NURSE PRACTITIONER

## 2021-07-16 ENCOUNTER — OFFICE VISIT (OUTPATIENT)
Dept: FAMILY MEDICINE | Facility: CLINIC | Age: 51
End: 2021-07-16
Payer: COMMERCIAL

## 2021-07-16 ENCOUNTER — LAB VISIT (OUTPATIENT)
Dept: LAB | Facility: HOSPITAL | Age: 51
End: 2021-07-16
Attending: STUDENT IN AN ORGANIZED HEALTH CARE EDUCATION/TRAINING PROGRAM
Payer: COMMERCIAL

## 2021-07-16 VITALS
DIASTOLIC BLOOD PRESSURE: 76 MMHG | WEIGHT: 191.81 LBS | RESPIRATION RATE: 16 BRPM | HEART RATE: 94 BPM | SYSTOLIC BLOOD PRESSURE: 120 MMHG | HEIGHT: 62 IN | OXYGEN SATURATION: 97 % | TEMPERATURE: 99 F | BODY MASS INDEX: 35.3 KG/M2

## 2021-07-16 DIAGNOSIS — M25.50 ARTHRALGIA, UNSPECIFIED JOINT: ICD-10-CM

## 2021-07-16 DIAGNOSIS — M72.2 PLANTAR FASCIITIS, LEFT: ICD-10-CM

## 2021-07-16 DIAGNOSIS — Z11.59 ENCOUNTER FOR HCV SCREENING TEST FOR LOW RISK PATIENT: ICD-10-CM

## 2021-07-16 DIAGNOSIS — Z12.11 COLON CANCER SCREENING: ICD-10-CM

## 2021-07-16 DIAGNOSIS — Z00.00 ENCOUNTER FOR PREVENTIVE HEALTH EXAMINATION: ICD-10-CM

## 2021-07-16 DIAGNOSIS — Z00.00 ENCOUNTER FOR PREVENTIVE HEALTH EXAMINATION: Primary | ICD-10-CM

## 2021-07-16 DIAGNOSIS — E66.9 OBESITY, UNSPECIFIED CLASSIFICATION, UNSPECIFIED OBESITY TYPE, UNSPECIFIED WHETHER SERIOUS COMORBIDITY PRESENT: ICD-10-CM

## 2021-07-16 DIAGNOSIS — M16.11 PRIMARY OSTEOARTHRITIS OF RIGHT HIP: ICD-10-CM

## 2021-07-16 PROCEDURE — 86803 HEPATITIS C AB TEST: CPT | Performed by: STUDENT IN AN ORGANIZED HEALTH CARE EDUCATION/TRAINING PROGRAM

## 2021-07-16 PROCEDURE — 80061 LIPID PANEL: CPT | Performed by: STUDENT IN AN ORGANIZED HEALTH CARE EDUCATION/TRAINING PROGRAM

## 2021-07-16 PROCEDURE — 99396 PR PREVENTIVE VISIT,EST,40-64: ICD-10-PCS | Mod: S$GLB,,, | Performed by: STUDENT IN AN ORGANIZED HEALTH CARE EDUCATION/TRAINING PROGRAM

## 2021-07-16 PROCEDURE — 99999 PR PBB SHADOW E&M-EST. PATIENT-LVL V: ICD-10-PCS | Mod: PBBFAC,,, | Performed by: STUDENT IN AN ORGANIZED HEALTH CARE EDUCATION/TRAINING PROGRAM

## 2021-07-16 PROCEDURE — 99999 PR PBB SHADOW E&M-EST. PATIENT-LVL V: CPT | Mod: PBBFAC,,, | Performed by: STUDENT IN AN ORGANIZED HEALTH CARE EDUCATION/TRAINING PROGRAM

## 2021-07-16 PROCEDURE — 80053 COMPREHEN METABOLIC PANEL: CPT | Performed by: STUDENT IN AN ORGANIZED HEALTH CARE EDUCATION/TRAINING PROGRAM

## 2021-07-16 PROCEDURE — 83036 HEMOGLOBIN GLYCOSYLATED A1C: CPT | Performed by: STUDENT IN AN ORGANIZED HEALTH CARE EDUCATION/TRAINING PROGRAM

## 2021-07-16 PROCEDURE — 86200 CCP ANTIBODY: CPT | Performed by: STUDENT IN AN ORGANIZED HEALTH CARE EDUCATION/TRAINING PROGRAM

## 2021-07-16 PROCEDURE — 36415 COLL VENOUS BLD VENIPUNCTURE: CPT | Mod: PO | Performed by: STUDENT IN AN ORGANIZED HEALTH CARE EDUCATION/TRAINING PROGRAM

## 2021-07-16 PROCEDURE — 86431 RHEUMATOID FACTOR QUANT: CPT | Performed by: STUDENT IN AN ORGANIZED HEALTH CARE EDUCATION/TRAINING PROGRAM

## 2021-07-16 PROCEDURE — 85025 COMPLETE CBC W/AUTO DIFF WBC: CPT | Performed by: STUDENT IN AN ORGANIZED HEALTH CARE EDUCATION/TRAINING PROGRAM

## 2021-07-16 PROCEDURE — 99396 PREV VISIT EST AGE 40-64: CPT | Mod: S$GLB,,, | Performed by: STUDENT IN AN ORGANIZED HEALTH CARE EDUCATION/TRAINING PROGRAM

## 2021-07-16 RX ORDER — PREGABALIN 75 MG/1
75 CAPSULE ORAL 2 TIMES DAILY
Qty: 60 CAPSULE | Refills: 0 | Status: SHIPPED | OUTPATIENT
Start: 2021-07-16 | End: 2022-01-03

## 2021-07-16 RX ORDER — CELECOXIB 200 MG/1
200 CAPSULE ORAL 2 TIMES DAILY PRN
Qty: 60 CAPSULE | Refills: 1 | Status: SHIPPED | OUTPATIENT
Start: 2021-07-16 | End: 2021-08-15

## 2021-07-17 LAB
ALBUMIN SERPL BCP-MCNC: 3.6 G/DL (ref 3.5–5.2)
ALP SERPL-CCNC: 54 U/L (ref 55–135)
ALT SERPL W/O P-5'-P-CCNC: 18 U/L (ref 10–44)
ANION GAP SERPL CALC-SCNC: 8 MMOL/L (ref 8–16)
AST SERPL-CCNC: 19 U/L (ref 10–40)
BASOPHILS # BLD AUTO: 0.05 K/UL (ref 0–0.2)
BASOPHILS NFR BLD: 0.6 % (ref 0–1.9)
BILIRUB SERPL-MCNC: 0.4 MG/DL (ref 0.1–1)
BUN SERPL-MCNC: 12 MG/DL (ref 6–20)
CALCIUM SERPL-MCNC: 9.7 MG/DL (ref 8.7–10.5)
CCP AB SER IA-ACNC: <0.5 U/ML
CHLORIDE SERPL-SCNC: 105 MMOL/L (ref 95–110)
CHOLEST SERPL-MCNC: 210 MG/DL (ref 120–199)
CHOLEST/HDLC SERPL: 3.2 {RATIO} (ref 2–5)
CO2 SERPL-SCNC: 27 MMOL/L (ref 23–29)
CREAT SERPL-MCNC: 0.8 MG/DL (ref 0.5–1.4)
DIFFERENTIAL METHOD: ABNORMAL
EOSINOPHIL # BLD AUTO: 0.3 K/UL (ref 0–0.5)
EOSINOPHIL NFR BLD: 3.7 % (ref 0–8)
ERYTHROCYTE [DISTWIDTH] IN BLOOD BY AUTOMATED COUNT: 14.3 % (ref 11.5–14.5)
EST. GFR  (AFRICAN AMERICAN): >60 ML/MIN/1.73 M^2
EST. GFR  (NON AFRICAN AMERICAN): >60 ML/MIN/1.73 M^2
ESTIMATED AVG GLUCOSE: 114 MG/DL (ref 68–131)
GLUCOSE SERPL-MCNC: 71 MG/DL (ref 70–110)
HBA1C MFR BLD: 5.6 % (ref 4–5.6)
HCT VFR BLD AUTO: 38.4 % (ref 37–48.5)
HCV AB SERPL QL IA: NEGATIVE
HDLC SERPL-MCNC: 66 MG/DL (ref 40–75)
HDLC SERPL: 31.4 % (ref 20–50)
HGB BLD-MCNC: 12.1 G/DL (ref 12–16)
IMM GRANULOCYTES # BLD AUTO: 0.01 K/UL (ref 0–0.04)
IMM GRANULOCYTES NFR BLD AUTO: 0.1 % (ref 0–0.5)
LDLC SERPL CALC-MCNC: 117.2 MG/DL (ref 63–159)
LYMPHOCYTES # BLD AUTO: 3.2 K/UL (ref 1–4.8)
LYMPHOCYTES NFR BLD: 41.3 % (ref 18–48)
MCH RBC QN AUTO: 28.2 PG (ref 27–31)
MCHC RBC AUTO-ENTMCNC: 31.5 G/DL (ref 32–36)
MCV RBC AUTO: 90 FL (ref 82–98)
MONOCYTES # BLD AUTO: 0.9 K/UL (ref 0.3–1)
MONOCYTES NFR BLD: 11.1 % (ref 4–15)
NEUTROPHILS # BLD AUTO: 3.4 K/UL (ref 1.8–7.7)
NEUTROPHILS NFR BLD: 43.2 % (ref 38–73)
NONHDLC SERPL-MCNC: 144 MG/DL
NRBC BLD-RTO: 0 /100 WBC
PLATELET # BLD AUTO: 337 K/UL (ref 150–450)
PMV BLD AUTO: 10.4 FL (ref 9.2–12.9)
POTASSIUM SERPL-SCNC: 4.4 MMOL/L (ref 3.5–5.1)
PROT SERPL-MCNC: 7.8 G/DL (ref 6–8.4)
RBC # BLD AUTO: 4.29 M/UL (ref 4–5.4)
SODIUM SERPL-SCNC: 140 MMOL/L (ref 136–145)
TRIGL SERPL-MCNC: 134 MG/DL (ref 30–150)
WBC # BLD AUTO: 7.78 K/UL (ref 3.9–12.7)

## 2021-07-19 ENCOUNTER — TELEPHONE (OUTPATIENT)
Dept: FAMILY MEDICINE | Facility: CLINIC | Age: 51
End: 2021-07-19

## 2021-07-19 ENCOUNTER — PATIENT MESSAGE (OUTPATIENT)
Dept: GASTROENTEROLOGY | Facility: CLINIC | Age: 51
End: 2021-07-19

## 2021-07-19 LAB — RHEUMATOID FACT SERPL-ACNC: <10 IU/ML (ref 0–15)

## 2021-07-21 ENCOUNTER — OFFICE VISIT (OUTPATIENT)
Dept: PODIATRY | Facility: CLINIC | Age: 51
End: 2021-07-21
Payer: COMMERCIAL

## 2021-07-21 ENCOUNTER — HOSPITAL ENCOUNTER (OUTPATIENT)
Dept: RADIOLOGY | Facility: CLINIC | Age: 51
Discharge: HOME | End: 2021-07-21
Attending: PODIATRIST
Payer: COMMERCIAL

## 2021-07-21 VITALS
WEIGHT: 191 LBS | DIASTOLIC BLOOD PRESSURE: 62 MMHG | HEIGHT: 62 IN | HEART RATE: 97 BPM | BODY MASS INDEX: 35.15 KG/M2 | SYSTOLIC BLOOD PRESSURE: 115 MMHG

## 2021-07-21 DIAGNOSIS — M67.88 ACHILLES TENDONOSIS OF LEFT LOWER EXTREMITY: Primary | ICD-10-CM

## 2021-07-21 DIAGNOSIS — G89.29 CHRONIC PAIN OF LEFT ANKLE: ICD-10-CM

## 2021-07-21 DIAGNOSIS — M25.572 PAIN OF JOINT OF LEFT ANKLE AND FOOT: ICD-10-CM

## 2021-07-21 DIAGNOSIS — M25.572 CHRONIC PAIN OF LEFT ANKLE: ICD-10-CM

## 2021-07-21 PROCEDURE — 99203 OFFICE O/P NEW LOW 30 MIN: CPT | Mod: S$GLB,,, | Performed by: PODIATRIST

## 2021-07-21 PROCEDURE — 99203 PR OFFICE/OUTPT VISIT, NEW, LEVL III, 30-44 MIN: ICD-10-PCS | Mod: S$GLB,,, | Performed by: PODIATRIST

## 2021-07-21 PROCEDURE — 73630 X-RAY EXAM OF FOOT: CPT | Mod: LT,S$GLB,, | Performed by: RADIOLOGY

## 2021-07-21 PROCEDURE — 73630 XR FOOT COMPLETE 3 VIEW LEFT: ICD-10-PCS | Mod: LT,S$GLB,, | Performed by: RADIOLOGY

## 2021-07-22 ENCOUNTER — TELEPHONE (OUTPATIENT)
Dept: GASTROENTEROLOGY | Facility: CLINIC | Age: 51
End: 2021-07-22

## 2021-07-22 ENCOUNTER — TELEPHONE (OUTPATIENT)
Dept: FAMILY MEDICINE | Facility: CLINIC | Age: 51
End: 2021-07-22

## 2021-07-23 ENCOUNTER — OFFICE VISIT (OUTPATIENT)
Dept: FAMILY MEDICINE | Facility: CLINIC | Age: 51
End: 2021-07-23
Payer: COMMERCIAL

## 2021-07-23 VITALS
HEIGHT: 62 IN | HEART RATE: 94 BPM | DIASTOLIC BLOOD PRESSURE: 68 MMHG | WEIGHT: 189.81 LBS | SYSTOLIC BLOOD PRESSURE: 114 MMHG | TEMPERATURE: 98 F | BODY MASS INDEX: 34.93 KG/M2 | OXYGEN SATURATION: 99 %

## 2021-07-23 DIAGNOSIS — R10.2 PELVIC PAIN: Primary | ICD-10-CM

## 2021-07-23 DIAGNOSIS — Z12.4 SCREENING FOR CERVICAL CANCER: ICD-10-CM

## 2021-07-23 DIAGNOSIS — Z01.419 WELL WOMAN EXAM WITH ROUTINE GYNECOLOGICAL EXAM: ICD-10-CM

## 2021-07-23 PROCEDURE — 99396 PREV VISIT EST AGE 40-64: CPT | Mod: S$GLB,,, | Performed by: PHYSICIAN ASSISTANT

## 2021-07-23 PROCEDURE — 99999 PR PBB SHADOW E&M-EST. PATIENT-LVL IV: ICD-10-PCS | Mod: PBBFAC,,, | Performed by: PHYSICIAN ASSISTANT

## 2021-07-23 PROCEDURE — 99396 PR PREVENTIVE VISIT,EST,40-64: ICD-10-PCS | Mod: S$GLB,,, | Performed by: PHYSICIAN ASSISTANT

## 2021-07-23 PROCEDURE — 99999 PR PBB SHADOW E&M-EST. PATIENT-LVL IV: CPT | Mod: PBBFAC,,, | Performed by: PHYSICIAN ASSISTANT

## 2021-07-23 PROCEDURE — 87624 HPV HI-RISK TYP POOLED RSLT: CPT | Performed by: PHYSICIAN ASSISTANT

## 2021-07-23 PROCEDURE — 88175 CYTOPATH C/V AUTO FLUID REDO: CPT | Performed by: PHYSICIAN ASSISTANT

## 2021-07-26 ENCOUNTER — HOSPITAL ENCOUNTER (OUTPATIENT)
Dept: RADIOLOGY | Facility: HOSPITAL | Age: 51
Discharge: HOME | End: 2021-07-26
Attending: PHYSICIAN ASSISTANT
Payer: COMMERCIAL

## 2021-07-26 DIAGNOSIS — R10.2 PELVIC PAIN: ICD-10-CM

## 2021-07-26 PROCEDURE — 76830 US PELVIS COMP WITH TRANSVAG NON-OB (XPD): ICD-10-PCS | Mod: 26,,, | Performed by: RADIOLOGY

## 2021-07-26 PROCEDURE — 76856 US EXAM PELVIC COMPLETE: CPT | Mod: 26,,, | Performed by: RADIOLOGY

## 2021-07-26 PROCEDURE — 76856 US PELVIS COMP WITH TRANSVAG NON-OB (XPD): ICD-10-PCS | Mod: 26,,, | Performed by: RADIOLOGY

## 2021-07-26 PROCEDURE — 76856 US EXAM PELVIC COMPLETE: CPT | Mod: TC

## 2021-07-26 PROCEDURE — 76830 TRANSVAGINAL US NON-OB: CPT | Mod: 26,,, | Performed by: RADIOLOGY

## 2021-07-27 ENCOUNTER — TELEPHONE (OUTPATIENT)
Dept: FAMILY MEDICINE | Facility: CLINIC | Age: 51
End: 2021-07-27

## 2021-07-27 DIAGNOSIS — R19.09 OTHER INTRA-ABDOMINAL AND PELVIC SWELLING, MASS AND LUMP: ICD-10-CM

## 2021-07-28 ENCOUNTER — TELEPHONE (OUTPATIENT)
Dept: FAMILY MEDICINE | Facility: CLINIC | Age: 51
End: 2021-07-28

## 2021-07-29 ENCOUNTER — TELEPHONE (OUTPATIENT)
Dept: FAMILY MEDICINE | Facility: CLINIC | Age: 51
End: 2021-07-29

## 2021-07-29 DIAGNOSIS — Z12.11 COLON CANCER SCREENING: Primary | ICD-10-CM

## 2021-07-30 ENCOUNTER — TELEPHONE (OUTPATIENT)
Dept: FAMILY MEDICINE | Facility: CLINIC | Age: 51
End: 2021-07-30

## 2021-08-02 ENCOUNTER — TELEPHONE (OUTPATIENT)
Dept: PODIATRY | Facility: CLINIC | Age: 51
End: 2021-08-02

## 2021-08-02 ENCOUNTER — TELEPHONE (OUTPATIENT)
Dept: FAMILY MEDICINE | Facility: CLINIC | Age: 51
End: 2021-08-02

## 2021-08-03 ENCOUNTER — PATIENT MESSAGE (OUTPATIENT)
Dept: FAMILY MEDICINE | Facility: CLINIC | Age: 51
End: 2021-08-03

## 2021-08-03 ENCOUNTER — HOSPITAL ENCOUNTER (OUTPATIENT)
Dept: RADIOLOGY | Facility: HOSPITAL | Age: 51
Discharge: HOME | End: 2021-08-03
Attending: PHYSICIAN ASSISTANT
Payer: COMMERCIAL

## 2021-08-03 DIAGNOSIS — R19.09 OTHER INTRA-ABDOMINAL AND PELVIC SWELLING, MASS AND LUMP: ICD-10-CM

## 2021-08-03 DIAGNOSIS — N83.8 OVARIAN MASS: Primary | ICD-10-CM

## 2021-08-03 LAB
CLINICAL INFO: NORMAL
CYTO CVX: NORMAL
CYTOLOGIST CVX/VAG CYTO: NORMAL
CYTOLOGY CMNT CVX/VAG CYTO-IMP: NORMAL
CYTOLOGY PAP THIN PREP EXPLANATION: NORMAL
DATE OF PREVIOUS PAP: NORMAL
DATE PREVIOUS BX: NO
HPV I/H RISK 4 DNA CVX QL NAA+PROBE: NOT DETECTED
LMP START DATE: NORMAL
MICROORGANISM CVX/VAG CYTO: NORMAL
SPECIMEN SOURCE CVX/VAG CYTO: NORMAL
STAT OF ADQ CVX/VAG CYTO-IMP: NORMAL

## 2021-08-03 PROCEDURE — 72194 CT PELVIS WITH AND WITHOUT CONTRAST: ICD-10-PCS | Mod: 26,,, | Performed by: RADIOLOGY

## 2021-08-03 PROCEDURE — 72194 CT PELVIS W/O & W/DYE: CPT | Mod: 26,,, | Performed by: RADIOLOGY

## 2021-08-03 PROCEDURE — 25500020 PHARM REV CODE 255

## 2021-08-03 PROCEDURE — 72194 CT PELVIS W/O & W/DYE: CPT | Mod: TC

## 2021-08-03 PROCEDURE — A9698 NON-RAD CONTRAST MATERIALNOC: HCPCS

## 2021-08-03 RX ADMIN — IOHEXOL 100 ML: 350 INJECTION, SOLUTION INTRAVENOUS at 01:08

## 2021-08-03 RX ADMIN — IOHEXOL 1000 ML: 9 SOLUTION ORAL at 01:08

## 2021-08-05 ENCOUNTER — PATIENT MESSAGE (OUTPATIENT)
Dept: PODIATRY | Facility: CLINIC | Age: 51
End: 2021-08-05

## 2021-08-05 ENCOUNTER — OFFICE VISIT (OUTPATIENT)
Dept: PODIATRY | Facility: CLINIC | Age: 51
End: 2021-08-05
Payer: COMMERCIAL

## 2021-08-05 ENCOUNTER — HOSPITAL ENCOUNTER (OUTPATIENT)
Dept: RADIOLOGY | Facility: HOSPITAL | Age: 51
Discharge: HOME | End: 2021-08-05
Attending: PODIATRIST
Payer: COMMERCIAL

## 2021-08-05 VITALS — WEIGHT: 189 LBS | HEIGHT: 62 IN | OXYGEN SATURATION: 95 % | BODY MASS INDEX: 34.78 KG/M2 | HEART RATE: 97 BPM

## 2021-08-05 DIAGNOSIS — G89.29 CHRONIC PAIN OF LEFT ANKLE: ICD-10-CM

## 2021-08-05 DIAGNOSIS — M67.88 ACHILLES TENDONOSIS OF LEFT LOWER EXTREMITY: ICD-10-CM

## 2021-08-05 DIAGNOSIS — M67.88 ACHILLES TENDINOSIS OF LEFT LOWER EXTREMITY: Primary | ICD-10-CM

## 2021-08-05 DIAGNOSIS — M25.572 CHRONIC PAIN OF LEFT ANKLE: ICD-10-CM

## 2021-08-05 DIAGNOSIS — M25.572 PAIN OF JOINT OF LEFT ANKLE AND FOOT: ICD-10-CM

## 2021-08-05 DIAGNOSIS — M76.62 TENDONITIS, ACHILLES, LEFT: ICD-10-CM

## 2021-08-05 PROCEDURE — 73721 MRI JNT OF LWR EXTRE W/O DYE: CPT | Mod: TC,PO,LT

## 2021-08-05 PROCEDURE — 99214 PR OFFICE/OUTPT VISIT, EST, LEVL IV, 30-39 MIN: ICD-10-PCS | Mod: S$GLB,,, | Performed by: PODIATRIST

## 2021-08-05 PROCEDURE — 99214 OFFICE O/P EST MOD 30 MIN: CPT | Mod: S$GLB,,, | Performed by: PODIATRIST

## 2021-08-05 RX ORDER — NAPROXEN 500 MG/1
500 TABLET ORAL 2 TIMES DAILY
Qty: 30 TABLET | Refills: 1 | Status: SHIPPED | OUTPATIENT
Start: 2021-08-05 | End: 2021-09-29

## 2021-08-09 ENCOUNTER — PATIENT MESSAGE (OUTPATIENT)
Dept: FAMILY MEDICINE | Facility: CLINIC | Age: 51
End: 2021-08-09

## 2021-08-20 ENCOUNTER — OFFICE VISIT (OUTPATIENT)
Dept: FAMILY MEDICINE | Facility: CLINIC | Age: 51
End: 2021-08-20
Payer: COMMERCIAL

## 2021-08-20 VITALS
SYSTOLIC BLOOD PRESSURE: 106 MMHG | HEIGHT: 62 IN | BODY MASS INDEX: 34.85 KG/M2 | WEIGHT: 189.38 LBS | OXYGEN SATURATION: 98 % | RESPIRATION RATE: 16 BRPM | HEART RATE: 93 BPM | DIASTOLIC BLOOD PRESSURE: 74 MMHG | TEMPERATURE: 98 F

## 2021-08-20 DIAGNOSIS — M70.61 TROCHANTERIC BURSITIS, RIGHT HIP: Primary | ICD-10-CM

## 2021-08-20 DIAGNOSIS — M67.88 ACHILLES TENDINOSIS OF LEFT LOWER EXTREMITY: ICD-10-CM

## 2021-08-20 PROCEDURE — 3008F BODY MASS INDEX DOCD: CPT | Mod: CPTII,S$GLB,, | Performed by: INTERNAL MEDICINE

## 2021-08-20 PROCEDURE — 20610 LARGE JOINT ASPIRATION/INJECTION: R GREATER TROCHANTERIC BURSA: ICD-10-PCS | Mod: S$GLB,,, | Performed by: INTERNAL MEDICINE

## 2021-08-20 PROCEDURE — 1125F AMNT PAIN NOTED PAIN PRSNT: CPT | Mod: CPTII,S$GLB,, | Performed by: INTERNAL MEDICINE

## 2021-08-20 PROCEDURE — 99212 OFFICE O/P EST SF 10 MIN: CPT | Mod: 25,S$GLB,, | Performed by: INTERNAL MEDICINE

## 2021-08-20 PROCEDURE — 3074F PR MOST RECENT SYSTOLIC BLOOD PRESSURE < 130 MM HG: ICD-10-PCS | Mod: CPTII,S$GLB,, | Performed by: INTERNAL MEDICINE

## 2021-08-20 PROCEDURE — 3074F SYST BP LT 130 MM HG: CPT | Mod: CPTII,S$GLB,, | Performed by: INTERNAL MEDICINE

## 2021-08-20 PROCEDURE — 3008F PR BODY MASS INDEX (BMI) DOCUMENTED: ICD-10-PCS | Mod: CPTII,S$GLB,, | Performed by: INTERNAL MEDICINE

## 2021-08-20 PROCEDURE — 3044F HG A1C LEVEL LT 7.0%: CPT | Mod: CPTII,S$GLB,, | Performed by: INTERNAL MEDICINE

## 2021-08-20 PROCEDURE — 1159F MED LIST DOCD IN RCRD: CPT | Mod: CPTII,S$GLB,, | Performed by: INTERNAL MEDICINE

## 2021-08-20 PROCEDURE — 1159F PR MEDICATION LIST DOCUMENTED IN MEDICAL RECORD: ICD-10-PCS | Mod: CPTII,S$GLB,, | Performed by: INTERNAL MEDICINE

## 2021-08-20 PROCEDURE — 3078F DIAST BP <80 MM HG: CPT | Mod: CPTII,S$GLB,, | Performed by: INTERNAL MEDICINE

## 2021-08-20 PROCEDURE — 20610 DRAIN/INJ JOINT/BURSA W/O US: CPT | Mod: S$GLB,,, | Performed by: INTERNAL MEDICINE

## 2021-08-20 PROCEDURE — 3078F PR MOST RECENT DIASTOLIC BLOOD PRESSURE < 80 MM HG: ICD-10-PCS | Mod: CPTII,S$GLB,, | Performed by: INTERNAL MEDICINE

## 2021-08-20 PROCEDURE — 99212 PR OFFICE/OUTPT VISIT, EST, LEVL II, 10-19 MIN: ICD-10-PCS | Mod: 25,S$GLB,, | Performed by: INTERNAL MEDICINE

## 2021-08-20 PROCEDURE — 1160F PR REVIEW ALL MEDS BY PRESCRIBER/CLIN PHARMACIST DOCUMENTED: ICD-10-PCS | Mod: CPTII,S$GLB,, | Performed by: INTERNAL MEDICINE

## 2021-08-20 PROCEDURE — 3044F PR MOST RECENT HEMOGLOBIN A1C LEVEL <7.0%: ICD-10-PCS | Mod: CPTII,S$GLB,, | Performed by: INTERNAL MEDICINE

## 2021-08-20 PROCEDURE — 1125F PR PAIN SEVERITY QUANTIFIED, PAIN PRESENT: ICD-10-PCS | Mod: CPTII,S$GLB,, | Performed by: INTERNAL MEDICINE

## 2021-08-20 PROCEDURE — 1160F RVW MEDS BY RX/DR IN RCRD: CPT | Mod: CPTII,S$GLB,, | Performed by: INTERNAL MEDICINE

## 2021-08-20 RX ORDER — CELECOXIB 200 MG/1
200 CAPSULE ORAL
COMMUNITY
End: 2022-09-07 | Stop reason: CLARIF

## 2021-08-20 RX ORDER — METHYLPREDNISOLONE ACETATE 40 MG/ML
40 INJECTION, SUSPENSION INTRA-ARTICULAR; INTRALESIONAL; INTRAMUSCULAR; SOFT TISSUE
Status: DISCONTINUED | OUTPATIENT
Start: 2021-08-20 | End: 2021-08-20 | Stop reason: HOSPADM

## 2021-08-20 RX ADMIN — METHYLPREDNISOLONE ACETATE 40 MG: 40 INJECTION, SUSPENSION INTRA-ARTICULAR; INTRALESIONAL; INTRAMUSCULAR; SOFT TISSUE at 09:08

## 2021-08-23 ENCOUNTER — OFFICE VISIT (OUTPATIENT)
Dept: GASTROENTEROLOGY | Facility: CLINIC | Age: 51
End: 2021-08-23
Payer: COMMERCIAL

## 2021-08-23 ENCOUNTER — PATIENT MESSAGE (OUTPATIENT)
Dept: FAMILY MEDICINE | Facility: CLINIC | Age: 51
End: 2021-08-23

## 2021-08-23 ENCOUNTER — TELEPHONE (OUTPATIENT)
Dept: FAMILY MEDICINE | Facility: CLINIC | Age: 51
End: 2021-08-23

## 2021-08-23 VITALS — WEIGHT: 190.69 LBS | HEIGHT: 62 IN | BODY MASS INDEX: 35.09 KG/M2

## 2021-08-23 DIAGNOSIS — R12 HEARTBURN: Primary | ICD-10-CM

## 2021-08-23 DIAGNOSIS — Z01.818 PRE-OP TESTING: ICD-10-CM

## 2021-08-23 DIAGNOSIS — Z12.11 COLON CANCER SCREENING: ICD-10-CM

## 2021-08-23 PROCEDURE — 1159F PR MEDICATION LIST DOCUMENTED IN MEDICAL RECORD: ICD-10-PCS | Mod: CPTII,S$GLB,, | Performed by: NURSE PRACTITIONER

## 2021-08-23 PROCEDURE — 99999 PR PBB SHADOW E&M-EST. PATIENT-LVL IV: ICD-10-PCS | Mod: PBBFAC,,, | Performed by: NURSE PRACTITIONER

## 2021-08-23 PROCEDURE — 1159F MED LIST DOCD IN RCRD: CPT | Mod: CPTII,S$GLB,, | Performed by: NURSE PRACTITIONER

## 2021-08-23 PROCEDURE — 3044F PR MOST RECENT HEMOGLOBIN A1C LEVEL <7.0%: ICD-10-PCS | Mod: CPTII,S$GLB,, | Performed by: NURSE PRACTITIONER

## 2021-08-23 PROCEDURE — 1160F RVW MEDS BY RX/DR IN RCRD: CPT | Mod: CPTII,S$GLB,, | Performed by: NURSE PRACTITIONER

## 2021-08-23 PROCEDURE — 99204 PR OFFICE/OUTPT VISIT, NEW, LEVL IV, 45-59 MIN: ICD-10-PCS | Mod: S$GLB,,, | Performed by: NURSE PRACTITIONER

## 2021-08-23 PROCEDURE — 99204 OFFICE O/P NEW MOD 45 MIN: CPT | Mod: S$GLB,,, | Performed by: NURSE PRACTITIONER

## 2021-08-23 PROCEDURE — 1126F PR PAIN SEVERITY QUANTIFIED, NO PAIN PRESENT: ICD-10-PCS | Mod: CPTII,S$GLB,, | Performed by: NURSE PRACTITIONER

## 2021-08-23 PROCEDURE — 3008F PR BODY MASS INDEX (BMI) DOCUMENTED: ICD-10-PCS | Mod: CPTII,S$GLB,, | Performed by: NURSE PRACTITIONER

## 2021-08-23 PROCEDURE — 3008F BODY MASS INDEX DOCD: CPT | Mod: CPTII,S$GLB,, | Performed by: NURSE PRACTITIONER

## 2021-08-23 PROCEDURE — 1126F AMNT PAIN NOTED NONE PRSNT: CPT | Mod: CPTII,S$GLB,, | Performed by: NURSE PRACTITIONER

## 2021-08-23 PROCEDURE — 3044F HG A1C LEVEL LT 7.0%: CPT | Mod: CPTII,S$GLB,, | Performed by: NURSE PRACTITIONER

## 2021-08-23 PROCEDURE — 99999 PR PBB SHADOW E&M-EST. PATIENT-LVL IV: CPT | Mod: PBBFAC,,, | Performed by: NURSE PRACTITIONER

## 2021-08-23 PROCEDURE — 1160F PR REVIEW ALL MEDS BY PRESCRIBER/CLIN PHARMACIST DOCUMENTED: ICD-10-PCS | Mod: CPTII,S$GLB,, | Performed by: NURSE PRACTITIONER

## 2021-08-23 RX ORDER — OMEPRAZOLE 40 MG/1
40 CAPSULE, DELAYED RELEASE ORAL DAILY
Qty: 30 CAPSULE | Refills: 2 | Status: SHIPPED | OUTPATIENT
Start: 2021-08-23 | End: 2023-02-28

## 2021-08-24 ENCOUNTER — LAB VISIT (OUTPATIENT)
Dept: LAB | Facility: HOSPITAL | Age: 51
End: 2021-08-24
Attending: OBSTETRICS & GYNECOLOGY
Payer: COMMERCIAL

## 2021-08-24 ENCOUNTER — OFFICE VISIT (OUTPATIENT)
Dept: OBSTETRICS AND GYNECOLOGY | Facility: CLINIC | Age: 51
End: 2021-08-24
Payer: COMMERCIAL

## 2021-08-24 VITALS — BODY MASS INDEX: 33.38 KG/M2 | DIASTOLIC BLOOD PRESSURE: 80 MMHG | SYSTOLIC BLOOD PRESSURE: 128 MMHG | WEIGHT: 182.5 LBS

## 2021-08-24 DIAGNOSIS — N83.8 OVARIAN MASS: ICD-10-CM

## 2021-08-24 DIAGNOSIS — R19.00 PELVIC MASS: Primary | ICD-10-CM

## 2021-08-24 PROCEDURE — 99999 PR PBB SHADOW E&M-EST. PATIENT-LVL III: ICD-10-PCS | Mod: PBBFAC,,, | Performed by: OBSTETRICS & GYNECOLOGY

## 2021-08-24 PROCEDURE — 99204 PR OFFICE/OUTPT VISIT, NEW, LEVL IV, 45-59 MIN: ICD-10-PCS | Mod: S$GLB,,, | Performed by: OBSTETRICS & GYNECOLOGY

## 2021-08-24 PROCEDURE — 99999 PR PBB SHADOW E&M-EST. PATIENT-LVL III: CPT | Mod: PBBFAC,,, | Performed by: OBSTETRICS & GYNECOLOGY

## 2021-08-24 PROCEDURE — 36415 COLL VENOUS BLD VENIPUNCTURE: CPT | Performed by: OBSTETRICS & GYNECOLOGY

## 2021-08-24 PROCEDURE — 86304 IMMUNOASSAY TUMOR CA 125: CPT | Performed by: OBSTETRICS & GYNECOLOGY

## 2021-08-24 PROCEDURE — 99204 OFFICE O/P NEW MOD 45 MIN: CPT | Mod: S$GLB,,, | Performed by: OBSTETRICS & GYNECOLOGY

## 2021-08-25 ENCOUNTER — PATIENT MESSAGE (OUTPATIENT)
Dept: OBSTETRICS AND GYNECOLOGY | Facility: CLINIC | Age: 51
End: 2021-08-25

## 2021-08-25 LAB — CANCER AG125 SERPL-ACNC: 16 U/ML (ref 0–30)

## 2021-09-07 ENCOUNTER — PATIENT MESSAGE (OUTPATIENT)
Dept: OBSTETRICS AND GYNECOLOGY | Facility: CLINIC | Age: 51
End: 2021-09-07

## 2021-09-16 ENCOUNTER — PATIENT MESSAGE (OUTPATIENT)
Dept: OBSTETRICS AND GYNECOLOGY | Facility: CLINIC | Age: 51
End: 2021-09-16

## 2021-09-16 DIAGNOSIS — R19.00 PELVIC MASS: ICD-10-CM

## 2021-09-16 DIAGNOSIS — R10.2 PELVIC PAIN: ICD-10-CM

## 2021-09-16 DIAGNOSIS — N83.8 OVARIAN MASS: Primary | ICD-10-CM

## 2021-09-18 ENCOUNTER — LAB VISIT (OUTPATIENT)
Dept: PRIMARY CARE CLINIC | Facility: CLINIC | Age: 51
End: 2021-09-18
Payer: COMMERCIAL

## 2021-09-18 DIAGNOSIS — Z01.818 PRE-OP TESTING: ICD-10-CM

## 2021-09-18 PROCEDURE — U0003 INFECTIOUS AGENT DETECTION BY NUCLEIC ACID (DNA OR RNA); SEVERE ACUTE RESPIRATORY SYNDROME CORONAVIRUS 2 (SARS-COV-2) (CORONAVIRUS DISEASE [COVID-19]), AMPLIFIED PROBE TECHNIQUE, MAKING USE OF HIGH THROUGHPUT TECHNOLOGIES AS DESCRIBED BY CMS-2020-01-R: HCPCS | Performed by: INTERNAL MEDICINE

## 2021-09-18 PROCEDURE — U0005 INFEC AGEN DETEC AMPLI PROBE: HCPCS | Performed by: INTERNAL MEDICINE

## 2021-09-19 LAB
SARS-COV-2 RNA RESP QL NAA+PROBE: NOT DETECTED
SARS-COV-2- CYCLE NUMBER: NORMAL

## 2021-09-21 ENCOUNTER — ANESTHESIA EVENT (OUTPATIENT)
Dept: ENDOSCOPY | Facility: HOSPITAL | Age: 51
End: 2021-09-21
Payer: COMMERCIAL

## 2021-09-21 ENCOUNTER — HOSPITAL ENCOUNTER (OUTPATIENT)
Facility: HOSPITAL | Age: 51
Discharge: HOME | End: 2021-09-21
Attending: INTERNAL MEDICINE | Admitting: INTERNAL MEDICINE
Payer: COMMERCIAL

## 2021-09-21 ENCOUNTER — ANESTHESIA (OUTPATIENT)
Dept: ENDOSCOPY | Facility: HOSPITAL | Age: 51
End: 2021-09-21
Payer: COMMERCIAL

## 2021-09-21 VITALS
TEMPERATURE: 98 F | RESPIRATION RATE: 14 BRPM | SYSTOLIC BLOOD PRESSURE: 117 MMHG | DIASTOLIC BLOOD PRESSURE: 61 MMHG | HEART RATE: 102 BPM | OXYGEN SATURATION: 98 %

## 2021-09-21 DIAGNOSIS — Z12.11 SCREENING FOR COLON CANCER: Primary | ICD-10-CM

## 2021-09-21 PROCEDURE — D9220A PRA ANESTHESIA: Mod: 33,ANES,, | Performed by: ANESTHESIOLOGY

## 2021-09-21 PROCEDURE — 45380 COLONOSCOPY AND BIOPSY: CPT | Performed by: INTERNAL MEDICINE

## 2021-09-21 PROCEDURE — 45380 COLONOSCOPY AND BIOPSY: CPT | Mod: 59,,, | Performed by: INTERNAL MEDICINE

## 2021-09-21 PROCEDURE — 45385 COLONOSCOPY W/LESION REMOVAL: CPT | Performed by: INTERNAL MEDICINE

## 2021-09-21 PROCEDURE — 45380 PR COLONOSCOPY,BIOPSY: ICD-10-PCS | Mod: 59,,, | Performed by: INTERNAL MEDICINE

## 2021-09-21 PROCEDURE — 88305 TISSUE EXAM BY PATHOLOGIST: CPT | Mod: 59 | Performed by: PATHOLOGY

## 2021-09-21 PROCEDURE — 27201089 HC SNARE, DISP (ANY): Performed by: INTERNAL MEDICINE

## 2021-09-21 PROCEDURE — 37000009 HC ANESTHESIA EA ADD 15 MINS: Performed by: INTERNAL MEDICINE

## 2021-09-21 PROCEDURE — 37000008 HC ANESTHESIA 1ST 15 MINUTES: Performed by: INTERNAL MEDICINE

## 2021-09-21 PROCEDURE — 88305 TISSUE EXAM BY PATHOLOGIST: CPT | Mod: 26,,, | Performed by: PATHOLOGY

## 2021-09-21 PROCEDURE — 45385 COLONOSCOPY W/LESION REMOVAL: CPT | Mod: 33,,, | Performed by: INTERNAL MEDICINE

## 2021-09-21 PROCEDURE — 25000003 PHARM REV CODE 250: Performed by: INTERNAL MEDICINE

## 2021-09-21 PROCEDURE — D9220A PRA ANESTHESIA: ICD-10-PCS | Mod: 33,ANES,, | Performed by: ANESTHESIOLOGY

## 2021-09-21 PROCEDURE — 88305 TISSUE EXAM BY PATHOLOGIST: ICD-10-PCS | Mod: 26,,, | Performed by: PATHOLOGY

## 2021-09-21 PROCEDURE — 63600175 PHARM REV CODE 636 W HCPCS: Performed by: NURSE ANESTHETIST, CERTIFIED REGISTERED

## 2021-09-21 PROCEDURE — D9220A PRA ANESTHESIA: ICD-10-PCS | Mod: 33,CRNA,, | Performed by: NURSE ANESTHETIST, CERTIFIED REGISTERED

## 2021-09-21 PROCEDURE — 25000003 PHARM REV CODE 250: Performed by: NURSE ANESTHETIST, CERTIFIED REGISTERED

## 2021-09-21 PROCEDURE — D9220A PRA ANESTHESIA: Mod: 33,CRNA,, | Performed by: NURSE ANESTHETIST, CERTIFIED REGISTERED

## 2021-09-21 PROCEDURE — 27201012 HC FORCEPS, HOT/COLD, DISP: Performed by: INTERNAL MEDICINE

## 2021-09-21 PROCEDURE — 45385 PR COLONOSCOPY,REMV LESN,SNARE: ICD-10-PCS | Mod: 33,,, | Performed by: INTERNAL MEDICINE

## 2021-09-21 RX ORDER — PROPOFOL 10 MG/ML
VIAL (ML) INTRAVENOUS
Status: DISCONTINUED | OUTPATIENT
Start: 2021-09-21 | End: 2021-09-21

## 2021-09-21 RX ORDER — SODIUM CHLORIDE 9 MG/ML
INJECTION, SOLUTION INTRAVENOUS CONTINUOUS
Status: DISCONTINUED | OUTPATIENT
Start: 2021-09-21 | End: 2021-09-21 | Stop reason: HOSPADM

## 2021-09-21 RX ORDER — LIDOCAINE HYDROCHLORIDE 20 MG/ML
INJECTION INTRAVENOUS
Status: DISCONTINUED | OUTPATIENT
Start: 2021-09-21 | End: 2021-09-21

## 2021-09-21 RX ADMIN — SODIUM CHLORIDE: 0.9 INJECTION, SOLUTION INTRAVENOUS at 08:09

## 2021-09-21 RX ADMIN — PROPOFOL 50 MG: 10 INJECTION, EMULSION INTRAVENOUS at 09:09

## 2021-09-21 RX ADMIN — PROPOFOL 100 MG: 10 INJECTION, EMULSION INTRAVENOUS at 09:09

## 2021-09-21 RX ADMIN — LIDOCAINE HYDROCHLORIDE 50 MG: 20 INJECTION, SOLUTION INTRAVENOUS at 09:09

## 2021-09-24 LAB
FINAL PATHOLOGIC DIAGNOSIS: NORMAL
GROSS: NORMAL
Lab: NORMAL

## 2021-09-28 ENCOUNTER — IMMUNIZATION (OUTPATIENT)
Dept: PRIMARY CARE CLINIC | Facility: CLINIC | Age: 51
End: 2021-09-28
Payer: COMMERCIAL

## 2021-09-28 DIAGNOSIS — Z23 NEED FOR VACCINATION: Primary | ICD-10-CM

## 2021-09-28 PROCEDURE — 0003A COVID-19, MRNA, LNP-S, PF, 30 MCG/0.3 ML DOSE VACCINE: ICD-10-PCS | Mod: S$GLB,,, | Performed by: FAMILY MEDICINE

## 2021-09-28 PROCEDURE — 91300 COVID-19, MRNA, LNP-S, PF, 30 MCG/0.3 ML DOSE VACCINE: CPT | Mod: S$GLB,,, | Performed by: FAMILY MEDICINE

## 2021-09-28 PROCEDURE — 0003A COVID-19, MRNA, LNP-S, PF, 30 MCG/0.3 ML DOSE VACCINE: CPT | Mod: S$GLB,,, | Performed by: FAMILY MEDICINE

## 2021-09-28 PROCEDURE — 91300 COVID-19, MRNA, LNP-S, PF, 30 MCG/0.3 ML DOSE VACCINE: ICD-10-PCS | Mod: S$GLB,,, | Performed by: FAMILY MEDICINE

## 2021-10-04 ENCOUNTER — PATIENT MESSAGE (OUTPATIENT)
Dept: OBSTETRICS AND GYNECOLOGY | Facility: CLINIC | Age: 51
End: 2021-10-04

## 2021-10-18 ENCOUNTER — HOSPITAL ENCOUNTER (EMERGENCY)
Facility: HOSPITAL | Age: 51
Discharge: HOME | End: 2021-10-18
Attending: EMERGENCY MEDICINE
Payer: COMMERCIAL

## 2021-10-18 VITALS
DIASTOLIC BLOOD PRESSURE: 68 MMHG | OXYGEN SATURATION: 99 % | HEART RATE: 95 BPM | HEIGHT: 62 IN | RESPIRATION RATE: 18 BRPM | TEMPERATURE: 98 F | SYSTOLIC BLOOD PRESSURE: 121 MMHG | WEIGHT: 182 LBS | BODY MASS INDEX: 33.49 KG/M2

## 2021-10-18 DIAGNOSIS — M77.9 BONE SPUR: ICD-10-CM

## 2021-10-18 DIAGNOSIS — M72.2 PLANTAR FASCIITIS: ICD-10-CM

## 2021-10-18 DIAGNOSIS — M79.671 FOOT PAIN, RIGHT: Primary | ICD-10-CM

## 2021-10-18 PROCEDURE — 99283 EMERGENCY DEPT VISIT LOW MDM: CPT

## 2021-10-25 ENCOUNTER — CLINICAL SUPPORT (OUTPATIENT)
Dept: URGENT CARE | Facility: CLINIC | Age: 51
End: 2021-10-25
Payer: COMMERCIAL

## 2021-10-25 ENCOUNTER — PATIENT MESSAGE (OUTPATIENT)
Dept: OBSTETRICS AND GYNECOLOGY | Facility: CLINIC | Age: 51
End: 2021-10-25

## 2021-10-25 ENCOUNTER — OFFICE VISIT (OUTPATIENT)
Dept: OBSTETRICS AND GYNECOLOGY | Facility: CLINIC | Age: 51
End: 2021-10-25
Payer: COMMERCIAL

## 2021-10-25 VITALS — WEIGHT: 182 LBS | BODY MASS INDEX: 33.29 KG/M2

## 2021-10-25 DIAGNOSIS — Z11.52 ENCOUNTER FOR SCREENING LABORATORY TESTING FOR COVID-19 VIRUS: Primary | ICD-10-CM

## 2021-10-25 DIAGNOSIS — R19.00 PELVIC MASS: Primary | ICD-10-CM

## 2021-10-25 DIAGNOSIS — D21.9 FIBROID: ICD-10-CM

## 2021-10-25 LAB
CTP QC/QA: YES
SARS-COV-2 RDRP RESP QL NAA+PROBE: NEGATIVE

## 2021-10-25 PROCEDURE — 99499 UNLISTED E&M SERVICE: CPT | Mod: S$GLB,,, | Performed by: OBSTETRICS & GYNECOLOGY

## 2021-10-25 PROCEDURE — 99499 NO LOS: ICD-10-PCS | Mod: S$GLB,,, | Performed by: OBSTETRICS & GYNECOLOGY

## 2021-10-25 PROCEDURE — U0002: ICD-10-PCS | Mod: QW,S$GLB,, | Performed by: FAMILY MEDICINE

## 2021-10-25 PROCEDURE — 99999 PR PBB SHADOW E&M-EST. PATIENT-LVL III: CPT | Mod: PBBFAC,,, | Performed by: OBSTETRICS & GYNECOLOGY

## 2021-10-25 PROCEDURE — 99211 PR OFFICE/OUTPT VISIT, EST, LEVL I: ICD-10-PCS | Mod: S$GLB,CS,, | Performed by: FAMILY MEDICINE

## 2021-10-25 PROCEDURE — U0002 COVID-19 LAB TEST NON-CDC: HCPCS | Mod: QW,S$GLB,, | Performed by: FAMILY MEDICINE

## 2021-10-25 PROCEDURE — 99999 PR PBB SHADOW E&M-EST. PATIENT-LVL III: ICD-10-PCS | Mod: PBBFAC,,, | Performed by: OBSTETRICS & GYNECOLOGY

## 2021-10-25 PROCEDURE — 99211 OFF/OP EST MAY X REQ PHY/QHP: CPT | Mod: S$GLB,CS,, | Performed by: FAMILY MEDICINE

## 2021-10-25 RX ORDER — MUPIROCIN 20 MG/G
OINTMENT TOPICAL
Status: CANCELLED | OUTPATIENT
Start: 2021-10-25

## 2021-10-28 PROBLEM — D21.9 FIBROID: Status: ACTIVE | Noted: 2021-10-28

## 2021-11-03 ENCOUNTER — OFFICE VISIT (OUTPATIENT)
Dept: OBSTETRICS AND GYNECOLOGY | Facility: CLINIC | Age: 51
End: 2021-11-03
Payer: COMMERCIAL

## 2021-11-03 VITALS — WEIGHT: 193 LBS | BODY MASS INDEX: 34.19 KG/M2

## 2021-11-03 DIAGNOSIS — Z09 POSTOP CHECK: Primary | ICD-10-CM

## 2021-11-03 PROCEDURE — 99999 PR PBB SHADOW E&M-EST. PATIENT-LVL III: CPT | Mod: PBBFAC,,, | Performed by: OBSTETRICS & GYNECOLOGY

## 2021-11-03 PROCEDURE — 99024 POSTOP FOLLOW-UP VISIT: CPT | Mod: S$GLB,,, | Performed by: OBSTETRICS & GYNECOLOGY

## 2021-11-03 PROCEDURE — 99024 PR POST-OP FOLLOW-UP VISIT: ICD-10-PCS | Mod: S$GLB,,, | Performed by: OBSTETRICS & GYNECOLOGY

## 2021-11-03 PROCEDURE — 99999 PR PBB SHADOW E&M-EST. PATIENT-LVL III: ICD-10-PCS | Mod: PBBFAC,,, | Performed by: OBSTETRICS & GYNECOLOGY

## 2021-11-09 ENCOUNTER — PATIENT MESSAGE (OUTPATIENT)
Dept: OBSTETRICS AND GYNECOLOGY | Facility: CLINIC | Age: 51
End: 2021-11-09
Payer: COMMERCIAL

## 2021-11-11 ENCOUNTER — PATIENT MESSAGE (OUTPATIENT)
Dept: OBSTETRICS AND GYNECOLOGY | Facility: CLINIC | Age: 51
End: 2021-11-11
Payer: COMMERCIAL

## 2021-12-01 ENCOUNTER — OFFICE VISIT (OUTPATIENT)
Dept: OBSTETRICS AND GYNECOLOGY | Facility: CLINIC | Age: 51
End: 2021-12-01
Payer: COMMERCIAL

## 2021-12-01 VITALS
SYSTOLIC BLOOD PRESSURE: 136 MMHG | HEIGHT: 63 IN | BODY MASS INDEX: 34.1 KG/M2 | WEIGHT: 192.44 LBS | DIASTOLIC BLOOD PRESSURE: 80 MMHG

## 2021-12-01 DIAGNOSIS — Z09 POSTOP CHECK: Primary | ICD-10-CM

## 2021-12-01 DIAGNOSIS — Z12.31 OTHER SCREENING MAMMOGRAM: ICD-10-CM

## 2021-12-01 PROCEDURE — 99999 PR PBB SHADOW E&M-EST. PATIENT-LVL III: CPT | Mod: PBBFAC,,, | Performed by: OBSTETRICS & GYNECOLOGY

## 2021-12-01 PROCEDURE — 99024 PR POST-OP FOLLOW-UP VISIT: ICD-10-PCS | Mod: S$GLB,,, | Performed by: OBSTETRICS & GYNECOLOGY

## 2021-12-01 PROCEDURE — 99024 POSTOP FOLLOW-UP VISIT: CPT | Mod: S$GLB,,, | Performed by: OBSTETRICS & GYNECOLOGY

## 2021-12-01 PROCEDURE — 99999 PR PBB SHADOW E&M-EST. PATIENT-LVL III: ICD-10-PCS | Mod: PBBFAC,,, | Performed by: OBSTETRICS & GYNECOLOGY

## 2021-12-09 ENCOUNTER — PATIENT MESSAGE (OUTPATIENT)
Dept: OBSTETRICS AND GYNECOLOGY | Facility: CLINIC | Age: 51
End: 2021-12-09
Payer: COMMERCIAL

## 2021-12-10 ENCOUNTER — PATIENT MESSAGE (OUTPATIENT)
Dept: OBSTETRICS AND GYNECOLOGY | Facility: CLINIC | Age: 51
End: 2021-12-10
Payer: COMMERCIAL

## 2022-01-03 ENCOUNTER — OFFICE VISIT (OUTPATIENT)
Dept: PODIATRY | Facility: CLINIC | Age: 52
End: 2022-01-03
Payer: COMMERCIAL

## 2022-01-03 VITALS — BODY MASS INDEX: 34.02 KG/M2 | WEIGHT: 192 LBS | HEIGHT: 63 IN | RESPIRATION RATE: 16 BRPM

## 2022-01-03 DIAGNOSIS — M79.671 RIGHT FOOT PAIN: ICD-10-CM

## 2022-01-03 DIAGNOSIS — M25.572 CHRONIC PAIN OF LEFT ANKLE: ICD-10-CM

## 2022-01-03 DIAGNOSIS — M76.62 TENDONITIS, ACHILLES, LEFT: ICD-10-CM

## 2022-01-03 DIAGNOSIS — M67.88 ACHILLES TENDINOSIS OF LEFT LOWER EXTREMITY: ICD-10-CM

## 2022-01-03 DIAGNOSIS — M72.2 PLANTAR FASCIITIS: Primary | ICD-10-CM

## 2022-01-03 DIAGNOSIS — G89.29 CHRONIC PAIN OF LEFT ANKLE: ICD-10-CM

## 2022-01-03 DIAGNOSIS — M25.572 PAIN OF JOINT OF LEFT ANKLE AND FOOT: ICD-10-CM

## 2022-01-03 PROCEDURE — 3008F PR BODY MASS INDEX (BMI) DOCUMENTED: ICD-10-PCS | Mod: CPTII,S$GLB,, | Performed by: PODIATRIST

## 2022-01-03 PROCEDURE — 1159F PR MEDICATION LIST DOCUMENTED IN MEDICAL RECORD: ICD-10-PCS | Mod: CPTII,S$GLB,, | Performed by: PODIATRIST

## 2022-01-03 PROCEDURE — 1160F PR REVIEW ALL MEDS BY PRESCRIBER/CLIN PHARMACIST DOCUMENTED: ICD-10-PCS | Mod: CPTII,S$GLB,, | Performed by: PODIATRIST

## 2022-01-03 PROCEDURE — 99214 OFFICE O/P EST MOD 30 MIN: CPT | Mod: S$GLB,,, | Performed by: PODIATRIST

## 2022-01-03 PROCEDURE — 1159F MED LIST DOCD IN RCRD: CPT | Mod: CPTII,S$GLB,, | Performed by: PODIATRIST

## 2022-01-03 PROCEDURE — 99214 PR OFFICE/OUTPT VISIT, EST, LEVL IV, 30-39 MIN: ICD-10-PCS | Mod: S$GLB,,, | Performed by: PODIATRIST

## 2022-01-03 PROCEDURE — 1160F RVW MEDS BY RX/DR IN RCRD: CPT | Mod: CPTII,S$GLB,, | Performed by: PODIATRIST

## 2022-01-03 PROCEDURE — 3008F BODY MASS INDEX DOCD: CPT | Mod: CPTII,S$GLB,, | Performed by: PODIATRIST

## 2022-01-03 RX ORDER — METHYLPREDNISOLONE 4 MG/1
TABLET ORAL
Qty: 1 EACH | Refills: 0 | Status: SHIPPED | OUTPATIENT
Start: 2022-01-03 | End: 2022-09-07 | Stop reason: CLARIF

## 2022-01-03 NOTE — PROGRESS NOTES
"  [address] [facility] [address]  [place] [address]  Phone: [phone]   [phone]    Patient's PCP:Orlnado Mohan MD  Referring Provider: No ref. provider found    Subjective:      Chief Complaint:: Follow-up (Achilles tendinosis)    HPI  Joselin Phillips is a 51 y.o. female who presents for bilateral foot pain.  C/o right foot pain after prolong walking/standing which causes burning.  C/o left foot then starts to hurt in back of achilles/heel. Went to ER on 10/18/21 for right foot pain, new xrays were taken, was told to continue NSAIDs and follow back up with Podiatry.  Stated she has worn the boot with heel lifts but then experiences pain in other foot.    Stated she has been off of her feet for 2 days and returned today, experienced pain at that point.  Still painful first thing in the AM, prolong walking/standing causing burning underneath her feet.      Vitals:    01/03/22 1344   Resp: 16   Weight: 87.1 kg (192 lb)   Height: 5' 3" (1.6 m)   PainSc:   8      Shoe Size:     Past Surgical History:   Procedure Laterality Date    COLONOSCOPY N/A 9/21/2021    Procedure: COLONOSCOPY;  Surgeon: Zach Hemphill MD;  Location: [facility];  Service: Endoscopy;  Laterality: N/A;    CYSTOSCOPY N/A 10/28/2021    Procedure: CYSTOSCOPY;  Surgeon: Merlin Milligan MD;  Location: [facility] OR;  Service: OB/GYN;  Laterality: N/A;    FOOT SURGERY      ROBOT-ASSISTED LAPAROSCOPIC HYSTERECTOMY N/A 10/28/2021    Procedure: ROBOTIC HYSTERECTOMY;  Surgeon: Merlin Milligan MD;  Location: [facility] OR;  Service: OB/GYN;  Laterality: N/A;    ROBOT-ASSISTED LAPAROSCOPIC SALPINGO-OOPHORECTOMY Bilateral 10/28/2021    Procedure: ROBOTIC SALPINGO-OOPHORECTOMY;  Surgeon: Merlin Milligan MD;  Location: [facility] OR;  Service: OB/GYN;  Laterality: Bilateral;    TUBAL LIGATION Bilateral      Past Medical History:   Diagnosis Date    Asthma     Foot pain     GERD (gastroesophageal reflux disease)     Ovarian mass     Pelvic " mass     Pelvic pain     Plantar fasciitis      Family History   Problem Relation Age of Onset    Arthritis Mother     Heart disease Father     Hypertension Father     Kidney disease Father     Colon cancer Neg Hx     Breast cancer Neg Hx     Ovarian cancer Neg Hx         Social History:   Marital Status:   Alcohol History:  reports previous alcohol use of about 1.0 standard drink of alcohol per week.  Tobacco History:  reports that she has never smoked. She has never used smokeless tobacco.  Drug History:  reports no history of drug use.    Review of patient's allergies indicates:  No Known Allergies    Current Outpatient Medications   Medication Sig Dispense Refill    celecoxib (CELEBREX) 200 MG capsule Take 200 mg by mouth.      naproxen (NAPROSYN) 500 MG tablet TAKE 1 TABLET(500 MG) BY MOUTH TWICE DAILY 30 tablet 1    omeprazole (PRILOSEC) 40 MG capsule Take 1 capsule (40 mg total) by mouth once daily. 30 capsule 2    methylPREDNISolone (MEDROL DOSEPACK) 4 mg tablet use as directed 1 each 0     No current facility-administered medications for this visit.       Review of Systems   Constitutional: Negative for chills, fatigue, fever and unexpected weight change.   HENT: Negative for hearing loss and trouble swallowing.    Eyes: Negative for photophobia and visual disturbance.   Respiratory: Negative for cough, shortness of breath and wheezing.    Cardiovascular: Negative for chest pain, palpitations and leg swelling.   Gastrointestinal: Negative for abdominal pain and nausea.   Genitourinary: Negative for dysuria and frequency.   Musculoskeletal: Positive for arthralgias and gait problem. Negative for back pain, joint swelling and myalgias.   Skin: Negative for rash and wound.   Neurological: Negative for tremors, seizures, weakness, numbness and headaches.   Hematological: Does not bruise/bleed easily.         Objective:        Physical Exam:   Foot Exam    General  General Appearance: appears  stated age and healthy   Orientation: alert and oriented to person, place, and time   Affect: appropriate   Gait: antalgic       Right Foot/Ankle     Inspection and Palpation  Ecchymosis: none  Tenderness: plantar fascia   Swelling: none   Arch: pes planus  Hammertoes: absent  Claw Toes: absent  Skin Exam: skin intact;   Neurovascular  Dorsalis pedis: 2+  Posterior tibial: 2+  Capillary Refill: 2+  Saphenous nerve sensation: normal  Tibial nerve sensation: normal  Superficial peroneal nerve sensation: normal  Deep peroneal nerve sensation: normal  Sural nerve sensation: normal    Edema  Type of edema: non-pitting    Muscle Strength  Ankle dorsiflexion: 5  Ankle plantar flexion: 5  Ankle inversion: 5  Ankle eversion: 5  Great toe extension: 5  Great toe flexion: 5    Range of Motion    Passive  Ankle dorsiflexion: 5      Tests  Anterior drawer: negative   Calcaneal squeeze: negative   Talar tilt: negative   PT Tinel's sign: negative      Comments  Pain on palpation of the infeior medial heel and central plantar heel. No pain present  with side to side compression of the calcaneus. Negative tinnel's sign  at the tarsal tunnel. Negative Clement's nerve pain. Negative Calcaneal nerve pain. No soft tissue masses. Pain absent  with dorsiflexion of the ankle. No edema, erythema, or ecchymosis noted.       Left Foot/Ankle      Inspection and Palpation  Ecchymosis: none  Tenderness: calcaneus tenderness (Distal Achilles tendon just proximal to calcaneal insertion)  Swelling: none   Arch: pes planus  Hammertoes: absent  Claw toes: absent  Skin Exam: skin intact;   Neurovascular  Dorsalis pedis: 2+  Posterior tibial: 2+  Capillary refill: 2+  Saphenous nerve sensation: normal  Tibial nerve sensation: normal  Superficial peroneal nerve sensation: normal  Deep peroneal nerve sensation: normal  Sural nerve sensation: normal    Edema  Type of edema: non-pitting    Muscle Strength  Ankle dorsiflexion: 5  Ankle plantar flexion:  5  Ankle inversion: 5  Ankle eversion: 5  Great toe extension: 5  Great toe flexion: 5    Range of Motion    Passive  Ankle dorsiflexion: 5, pain    Active  Plantar flexion: pain    Tests  Anterior drawer: negative   Calcaneal squeeze: negative   Talar tilt: negative   PT Tinel's sign: negative  Paresthesia: negative  Comments  There is an area of palpable thickening in the distal Achilles tendon.  No palpable void is noted.    Physical Exam  Cardiovascular:      Pulses:           Dorsalis pedis pulses are 2+ on the right side and 2+ on the left side.        Posterior tibial pulses are 2+ on the right side and 2+ on the left side.         Imaging: X-Ray Foot Complete Right  EXAMINATION:  XR FOOT COMPLETE 3 VIEW RIGHT    CLINICAL HISTORY:  . Pain in right foot    TECHNIQUE:  AP, lateral, and oblique views of the right foot were performed.    COMPARISON:  None    FINDINGS:  No acute fracture. Os peroneum, anatomic variant.  Dorsal and plantar calcaneal spurs.  No malalignment.  Preserved joint spaces.    Electronically signed by: Rex Terry  Date:    10/18/2021  Time:    12:17               Assessment:       1. Plantar fasciitis    2. Achilles tendinosis of left lower extremity    3. Tendonitis, Achilles, left    4. Pain of joint of left ankle and foot    5. Chronic pain of left ankle    6. Right foot pain      Plan:   Plantar fasciitis  -     methylPREDNISolone (MEDROL DOSEPACK) 4 mg tablet; use as directed  Dispense: 1 each; Refill: 0    Achilles tendinosis of left lower extremity    Tendonitis, Achilles, left    Pain of joint of left ankle and foot    Chronic pain of left ankle    Right foot pain      Follow up if symptoms worsen or fail to improve.    Procedures        Plantar Fasciitis Level I Treatment:   Anti-inflammatory  No bare feet  Over the counter insert  Restrict activity level   Use running shoes at full time  No impact exercise and stretch gastrocnemius muscle    I discussed with the patient that  if her symptoms are not improving then I recommend immobilization in the cam walker boot on the right foot.    Counseling:     I provided patient education verbally regarding:   Patient diagnosis, treatment options, as well as alternatives, risks, and benefits.     Discussed different treatment options for heel pain. I gave written and verbal instructions on heel cord stretching and this was demonstrated for the patient. Patient expressed understanding. Discussed wearing appropriate shoe gear and avoiding flats, slippers, sandals, barefoot, and sockfeet. Recommended arch supports. My recommendation for OTC supports is Spenco polysorb replacement insoles or patient may elect more aggressive treatment with prescription arch supports. We also discussed cortisone injections and NSAID therapy.     Discussed treatment of Achilles tendonitis with rest, ice, oral NSAID, topical antiinflammatory creams, heel lifts, no barefoot, and cam walker boot if needed. Explained the importance of proper stretching. Discussed possibility of MRI for further evaluation if symptoms do not improve.       This note was created using Dragon voice recognition software that occasionally misinterpreted phrases or words.

## 2022-01-03 NOTE — PATIENT INSTRUCTIONS
Understanding Plantar Fasciitis    Plantar fasciitis is a condition that causes foot and heel pain. The plantar fascia is a tough band of tissue that runs across the bottom of the foot from the heel to the toes. This tissue pulls on the heel bone. It supports the arch of the foot as it pushes off the ground. If the tissue becomes irritated or red and swollen (inflamed), it is called plantar fasciitis.  How to say it  PLAN-[id number] [id number]     What causes plantar fasciitis?  Plantar fasciitis most often occurs from overusing the plantar fascia. The tissue may become damaged from activities that put repeated stress on the heel and foot. Or it may wear down over time with age and ankle stiffness. You are more likely to have plantar fasciitis if you:  · Do activities that require a lot of running, jumping, or dancing  · Have a job that requires being on your feet for long periods  · Are overweight or obese  · Have certain foot problems, such as a tight Achilles tendon, flat feet, or high arches  · Often wear poorly fitting shoes    Symptoms of plantar fasciitis  The condition most often causes pain in the heel and the bottom of the foot. The pain may occur when you take your first steps in the morning. It may get better as you walk throughout the day. But as you continue to put weight on the foot, the pain often returns. Pain may also occur after standing or sitting for long periods.    Treating plantar fasciitis  Treatments for plantar fasciitis include:  · Resting the foot. This involves limiting movements that make your foot hurt. You may also need to avoid certain sports and types of work for a time.  · Using cold packs. Put an ice pack on the heel and foot to help reduce pain and swelling.  · Taking pain medicines. Prescription and over-the-counter pain medicines can help relieve pain and swelling.  · Using heel cups or foot inserts (orthotics). These are placed in the shoes to help support the heel or arch and  cushion the heel. You may also be told to buy proper-fitting shoes with good arch support and cushioned soles.  · Taping the foot. This supports the arch and limits the movement of the plantar fascia to help relieve symptoms.  · Wearing a night splint. This stretches the plantar fascia and leg muscles while you sleep. This may help relieve pain.  · Doing exercises and physical therapy. These stretch and strengthen the plantar fascia and the muscles in the leg that support the heel and foot.  · Getting shots of medicine into the foot. These may help relieve symptoms for a time.  · Having surgery. This may be needed if other treatments fail to relieve symptoms. During surgery, the surgeon may partially cut the plantar fascia to release tension.    Possible complications of plantar fasciitis  Without proper care and treatment, healing may take longer than normal. Also, symptoms may continue or get worse. Over time, the plantar fascia may be damaged. This can make it hard to walk or even stand without pain.    When to call your healthcare provider  Call your healthcare provider right away if you have any of these:  · Fever of 100.4°F (38°C) or higher, or as directed  · Symptoms that dont get better with treatment, or get worse  · New symptoms, such as numbness, tingling, or weakness in the foot     Date Last Reviewed: 3/10/2016  © 3188-6947 [employer]. [address] [place] [address], [place] [address]. All rights reserved. This information is not intended as a substitute for professional medical care. Always follow your healthcare professional's instructions.

## 2022-01-26 ENCOUNTER — DOCUMENTATION ONLY (OUTPATIENT)
Dept: ADMINISTRATIVE | Facility: HOSPITAL | Age: 52
End: 2022-01-26
Payer: COMMERCIAL

## 2022-01-26 LAB
CTP QC/QA: YES
SARS-COV-2 AG RESP QL IA.RAPID: POSITIVE

## 2022-04-25 ENCOUNTER — PATIENT MESSAGE (OUTPATIENT)
Dept: PODIATRY | Facility: CLINIC | Age: 52
End: 2022-04-25
Payer: COMMERCIAL

## 2022-04-25 ENCOUNTER — TELEPHONE (OUTPATIENT)
Dept: PODIATRY | Facility: CLINIC | Age: 52
End: 2022-04-25
Payer: COMMERCIAL

## 2022-05-02 ENCOUNTER — PATIENT MESSAGE (OUTPATIENT)
Dept: OBSTETRICS AND GYNECOLOGY | Facility: CLINIC | Age: 52
End: 2022-05-02
Payer: COMMERCIAL

## 2022-05-02 RX ORDER — ESTRADIOL 0.1 MG/D
1 FILM, EXTENDED RELEASE TRANSDERMAL
Qty: 8 PATCH | Refills: 6 | Status: SHIPPED | OUTPATIENT
Start: 2022-05-02 | End: 2022-08-17 | Stop reason: SDUPTHER

## 2022-05-18 ENCOUNTER — OCCUPATIONAL HEALTH (OUTPATIENT)
Dept: URGENT CARE | Facility: CLINIC | Age: 52
End: 2022-05-18

## 2022-05-18 PROCEDURE — 80305 DRUG TEST PRSMV DIR OPT OBS: CPT | Mod: S$GLB,,, | Performed by: NURSE PRACTITIONER

## 2022-05-18 PROCEDURE — 80305 PR DRUG SCREEN - 1: ICD-10-PCS | Mod: S$GLB,,, | Performed by: NURSE PRACTITIONER

## 2022-07-15 ENCOUNTER — PATIENT MESSAGE (OUTPATIENT)
Dept: PODIATRY | Facility: CLINIC | Age: 52
End: 2022-07-15
Payer: COMMERCIAL

## 2022-07-22 ENCOUNTER — OFFICE VISIT (OUTPATIENT)
Dept: FAMILY MEDICINE | Facility: CLINIC | Age: 52
End: 2022-07-22
Payer: COMMERCIAL

## 2022-07-22 ENCOUNTER — LAB VISIT (OUTPATIENT)
Dept: LAB | Facility: HOSPITAL | Age: 52
End: 2022-07-22
Attending: STUDENT IN AN ORGANIZED HEALTH CARE EDUCATION/TRAINING PROGRAM
Payer: COMMERCIAL

## 2022-07-22 VITALS
RESPIRATION RATE: 14 BRPM | WEIGHT: 191.81 LBS | DIASTOLIC BLOOD PRESSURE: 82 MMHG | OXYGEN SATURATION: 96 % | BODY MASS INDEX: 33.98 KG/M2 | HEART RATE: 98 BPM | SYSTOLIC BLOOD PRESSURE: 114 MMHG | HEIGHT: 63 IN | TEMPERATURE: 103 F

## 2022-07-22 DIAGNOSIS — R79.9 ABNORMAL FINDING OF BLOOD CHEMISTRY, UNSPECIFIED: ICD-10-CM

## 2022-07-22 DIAGNOSIS — E78.2 MIXED HYPERLIPIDEMIA: ICD-10-CM

## 2022-07-22 DIAGNOSIS — M76.62 LEFT ACHILLES TENDINITIS: Primary | ICD-10-CM

## 2022-07-22 DIAGNOSIS — Z00.00 ENCOUNTER FOR PREVENTIVE HEALTH EXAMINATION: ICD-10-CM

## 2022-07-22 DIAGNOSIS — Z12.31 ENCOUNTER FOR SCREENING MAMMOGRAM FOR MALIGNANT NEOPLASM OF BREAST: ICD-10-CM

## 2022-07-22 DIAGNOSIS — M72.2 PLANTAR FASCIITIS: ICD-10-CM

## 2022-07-22 LAB
ALBUMIN SERPL BCP-MCNC: 3.5 G/DL (ref 3.5–5.2)
ALP SERPL-CCNC: 72 U/L (ref 55–135)
ALT SERPL W/O P-5'-P-CCNC: 23 U/L (ref 10–44)
ANION GAP SERPL CALC-SCNC: 9 MMOL/L (ref 8–16)
AST SERPL-CCNC: 22 U/L (ref 10–40)
BASOPHILS # BLD AUTO: 0.06 K/UL (ref 0–0.2)
BASOPHILS NFR BLD: 0.7 % (ref 0–1.9)
BILIRUB SERPL-MCNC: 0.5 MG/DL (ref 0.1–1)
BUN SERPL-MCNC: 10 MG/DL (ref 6–20)
CALCIUM SERPL-MCNC: 10.4 MG/DL (ref 8.7–10.5)
CHLORIDE SERPL-SCNC: 107 MMOL/L (ref 95–110)
CHOLEST SERPL-MCNC: 219 MG/DL (ref 120–199)
CHOLEST/HDLC SERPL: 3.2 {RATIO} (ref 2–5)
CO2 SERPL-SCNC: 26 MMOL/L (ref 23–29)
CREAT SERPL-MCNC: 0.7 MG/DL (ref 0.5–1.4)
DIFFERENTIAL METHOD: ABNORMAL
EOSINOPHIL # BLD AUTO: 0.3 K/UL (ref 0–0.5)
EOSINOPHIL NFR BLD: 3.3 % (ref 0–8)
ERYTHROCYTE [DISTWIDTH] IN BLOOD BY AUTOMATED COUNT: 14.7 % (ref 11.5–14.5)
EST. GFR  (AFRICAN AMERICAN): >60 ML/MIN/1.73 M^2
EST. GFR  (NON AFRICAN AMERICAN): >60 ML/MIN/1.73 M^2
ESTIMATED AVG GLUCOSE: 123 MG/DL (ref 68–131)
FERRITIN SERPL-MCNC: 51 NG/ML (ref 20–300)
FOLATE SERPL-MCNC: 9.7 NG/ML (ref 4–24)
GLUCOSE SERPL-MCNC: 104 MG/DL (ref 70–110)
HBA1C MFR BLD: 5.9 % (ref 4–5.6)
HCT VFR BLD AUTO: 39.3 % (ref 37–48.5)
HDLC SERPL-MCNC: 68 MG/DL (ref 40–75)
HDLC SERPL: 31.1 % (ref 20–50)
HGB BLD-MCNC: 12.6 G/DL (ref 12–16)
IMM GRANULOCYTES # BLD AUTO: 0.02 K/UL (ref 0–0.04)
IMM GRANULOCYTES NFR BLD AUTO: 0.2 % (ref 0–0.5)
IRON SERPL-MCNC: 68 UG/DL (ref 30–160)
LDLC SERPL CALC-MCNC: 126.6 MG/DL (ref 63–159)
LYMPHOCYTES # BLD AUTO: 3.2 K/UL (ref 1–4.8)
LYMPHOCYTES NFR BLD: 37.8 % (ref 18–48)
MCH RBC QN AUTO: 27.7 PG (ref 27–31)
MCHC RBC AUTO-ENTMCNC: 32.1 G/DL (ref 32–36)
MCV RBC AUTO: 86 FL (ref 82–98)
MONOCYTES # BLD AUTO: 0.8 K/UL (ref 0.3–1)
MONOCYTES NFR BLD: 9.9 % (ref 4–15)
NEUTROPHILS # BLD AUTO: 4.1 K/UL (ref 1.8–7.7)
NEUTROPHILS NFR BLD: 48.1 % (ref 38–73)
NONHDLC SERPL-MCNC: 151 MG/DL
NRBC BLD-RTO: 0 /100 WBC
PLATELET # BLD AUTO: 324 K/UL (ref 150–450)
PMV BLD AUTO: 10 FL (ref 9.2–12.9)
POTASSIUM SERPL-SCNC: 4.2 MMOL/L (ref 3.5–5.1)
PROT SERPL-MCNC: 8 G/DL (ref 6–8.4)
RBC # BLD AUTO: 4.55 M/UL (ref 4–5.4)
SATURATED IRON: 15 % (ref 20–50)
SODIUM SERPL-SCNC: 142 MMOL/L (ref 136–145)
TOTAL IRON BINDING CAPACITY: 441 UG/DL (ref 250–450)
TRANSFERRIN SERPL-MCNC: 298 MG/DL (ref 200–375)
TRIGL SERPL-MCNC: 122 MG/DL (ref 30–150)
TSH SERPL DL<=0.005 MIU/L-ACNC: 0.75 UIU/ML (ref 0.4–4)
WBC # BLD AUTO: 8.42 K/UL (ref 3.9–12.7)

## 2022-07-22 PROCEDURE — 3008F PR BODY MASS INDEX (BMI) DOCUMENTED: ICD-10-PCS | Mod: CPTII,S$GLB,, | Performed by: STUDENT IN AN ORGANIZED HEALTH CARE EDUCATION/TRAINING PROGRAM

## 2022-07-22 PROCEDURE — 1159F MED LIST DOCD IN RCRD: CPT | Mod: CPTII,S$GLB,, | Performed by: STUDENT IN AN ORGANIZED HEALTH CARE EDUCATION/TRAINING PROGRAM

## 2022-07-22 PROCEDURE — 3074F PR MOST RECENT SYSTOLIC BLOOD PRESSURE < 130 MM HG: ICD-10-PCS | Mod: CPTII,S$GLB,, | Performed by: STUDENT IN AN ORGANIZED HEALTH CARE EDUCATION/TRAINING PROGRAM

## 2022-07-22 PROCEDURE — 80053 COMPREHEN METABOLIC PANEL: CPT | Performed by: STUDENT IN AN ORGANIZED HEALTH CARE EDUCATION/TRAINING PROGRAM

## 2022-07-22 PROCEDURE — 85025 COMPLETE CBC W/AUTO DIFF WBC: CPT | Performed by: STUDENT IN AN ORGANIZED HEALTH CARE EDUCATION/TRAINING PROGRAM

## 2022-07-22 PROCEDURE — 1159F PR MEDICATION LIST DOCUMENTED IN MEDICAL RECORD: ICD-10-PCS | Mod: CPTII,S$GLB,, | Performed by: STUDENT IN AN ORGANIZED HEALTH CARE EDUCATION/TRAINING PROGRAM

## 2022-07-22 PROCEDURE — 99999 PR PBB SHADOW E&M-EST. PATIENT-LVL IV: ICD-10-PCS | Mod: PBBFAC,,, | Performed by: STUDENT IN AN ORGANIZED HEALTH CARE EDUCATION/TRAINING PROGRAM

## 2022-07-22 PROCEDURE — 3079F PR MOST RECENT DIASTOLIC BLOOD PRESSURE 80-89 MM HG: ICD-10-PCS | Mod: CPTII,S$GLB,, | Performed by: STUDENT IN AN ORGANIZED HEALTH CARE EDUCATION/TRAINING PROGRAM

## 2022-07-22 PROCEDURE — 80061 LIPID PANEL: CPT | Performed by: STUDENT IN AN ORGANIZED HEALTH CARE EDUCATION/TRAINING PROGRAM

## 2022-07-22 PROCEDURE — 99396 PREV VISIT EST AGE 40-64: CPT | Mod: S$GLB,,, | Performed by: STUDENT IN AN ORGANIZED HEALTH CARE EDUCATION/TRAINING PROGRAM

## 2022-07-22 PROCEDURE — 84466 ASSAY OF TRANSFERRIN: CPT | Performed by: STUDENT IN AN ORGANIZED HEALTH CARE EDUCATION/TRAINING PROGRAM

## 2022-07-22 PROCEDURE — 99999 PR PBB SHADOW E&M-EST. PATIENT-LVL IV: CPT | Mod: PBBFAC,,, | Performed by: STUDENT IN AN ORGANIZED HEALTH CARE EDUCATION/TRAINING PROGRAM

## 2022-07-22 PROCEDURE — 83921 ORGANIC ACID SINGLE QUANT: CPT | Performed by: STUDENT IN AN ORGANIZED HEALTH CARE EDUCATION/TRAINING PROGRAM

## 2022-07-22 PROCEDURE — 82728 ASSAY OF FERRITIN: CPT | Performed by: STUDENT IN AN ORGANIZED HEALTH CARE EDUCATION/TRAINING PROGRAM

## 2022-07-22 PROCEDURE — 36415 COLL VENOUS BLD VENIPUNCTURE: CPT | Mod: PO | Performed by: STUDENT IN AN ORGANIZED HEALTH CARE EDUCATION/TRAINING PROGRAM

## 2022-07-22 PROCEDURE — 3079F DIAST BP 80-89 MM HG: CPT | Mod: CPTII,S$GLB,, | Performed by: STUDENT IN AN ORGANIZED HEALTH CARE EDUCATION/TRAINING PROGRAM

## 2022-07-22 PROCEDURE — 3008F BODY MASS INDEX DOCD: CPT | Mod: CPTII,S$GLB,, | Performed by: STUDENT IN AN ORGANIZED HEALTH CARE EDUCATION/TRAINING PROGRAM

## 2022-07-22 PROCEDURE — 3074F SYST BP LT 130 MM HG: CPT | Mod: CPTII,S$GLB,, | Performed by: STUDENT IN AN ORGANIZED HEALTH CARE EDUCATION/TRAINING PROGRAM

## 2022-07-22 PROCEDURE — 84443 ASSAY THYROID STIM HORMONE: CPT | Performed by: STUDENT IN AN ORGANIZED HEALTH CARE EDUCATION/TRAINING PROGRAM

## 2022-07-22 PROCEDURE — 99396 PR PREVENTIVE VISIT,EST,40-64: ICD-10-PCS | Mod: S$GLB,,, | Performed by: STUDENT IN AN ORGANIZED HEALTH CARE EDUCATION/TRAINING PROGRAM

## 2022-07-22 PROCEDURE — 83036 HEMOGLOBIN GLYCOSYLATED A1C: CPT | Performed by: STUDENT IN AN ORGANIZED HEALTH CARE EDUCATION/TRAINING PROGRAM

## 2022-07-22 PROCEDURE — 82607 VITAMIN B-12: CPT | Performed by: STUDENT IN AN ORGANIZED HEALTH CARE EDUCATION/TRAINING PROGRAM

## 2022-07-22 PROCEDURE — 82746 ASSAY OF FOLIC ACID SERUM: CPT | Performed by: STUDENT IN AN ORGANIZED HEALTH CARE EDUCATION/TRAINING PROGRAM

## 2022-07-22 NOTE — PATIENT INSTRUCTIONS
Junior Olivera, Please read below to learn more on healthy choices, screenings, and useful information related to your health.  Most of my patients read it. Most of my healthy patients complete their cancer screenings. Don't lose out on improving your health.     Table of Contents:     Overdue health maintenance   Cancer prevention  Explanation on the different components of your blood work and interpretation  Frequently asked questions   Blood pressure log     If you are due for any health screening(s) below please notify me so we can arrange them to be ordered and scheduled to maintain your health. Most healthy patients at your age complete them.     Tests to Keep You Healthy    Mammogram: ORDERED BUT NOT SCHEDULED  Colon Cancer Screening: Met    Breast Cancer Screening    Breast cancer is the second most common cancer in women after skin cancer, and the second leading cause of death from cancer after lung cancer. Mammograms can detect breast cancer early, which significantly increases the chances of curing the cancer.      A screening mammogram is an x-ray image of the breasts used for early breast cancer detection. It can help reduce the number of deaths from breast cancer among women. To get a clear image, the breast is placed between two plastic plates to make it flat. How often a mammogram is needed depends on your age and your breast cancer risk.    Although you are still overdue for this important screening, due to the COVID-19 pandemic, we recommend scheduling it in the near future.                       Cancer Prevention    Why should you choose to get screened for cancer? One simple reason is because you are important. You matter and deserve to have the best health so you can fulfill your great potential.       Colon Cancer screening - Most colon cancers can be prevented by screening. In most cases a polyp in the colon can grow and is not cancerous at first, but it can become cancerous years later.  "A polyp is like a seed that can grow into a weed if it is left in the soil. If the seed is detected and removed, no weed sprouts. A FIT test/Cologuard test and colonoscopy can detect precancerous polyps and lead to the prevention of cancer. A polyp is just like a seed that can be removed before the roots take hold. A colonoscopy can remove these polyps and eliminate the chance that these polyps turn into cancer.     Cervical Cancer screening- A PAP smear can detect precancerous cells that can become cervical cancer and can lead to procedures to remove them. It is very important to get a PAP smear as investing these few minutes can help prevent a lot of trouble down the road. If you want to do the most you can do to spend more time with your family and friends', cancer screenings can help with that goal.     Breast Cancer screening- Mammograms can detect breast cancer before it has spread and early detection allows the ability to remove the lesion prior to any spread. It is similar to a small rotten spot on fruit. If the spoiled part is removed quickly it can preserve the rest of the fruit, but if it is left alone it will corrupt the whole peach.     Smoking Cessation- "Smoking is like a chimney. The tar builds up and causes a back draft which leads to a cough. Smoking is like sitting in a car with a blocked exhaust system." Smoking can increase your risk for lung, mouth, throat, nose, esophagus, bladder, kidney, ureter, pancreas, stomach, liver, cervix, ovary, bowel, and leukemia [2]. If you have smoked in the past, you might meet the criteria for a CT lung cancer screening.     Lung Cancer Screening - "The U.S. Preventive Services Task Force (USPSTF) recommendsexternal icon yearly lung cancer screening with LDCT for people who--have a 20 pack-year or more smoking history, and smoke now or have quit within the past 15 years, and are between 50 and 80 years old. A pack-year is smoking an average of one pack of " "cigarettes per day for one year. For example, a person could have a 20 pack-year history by smoking one pack a day for 20 years or two packs a day for 10 years." [3]    Hypertension - Common high blood pressure meds may lower colorectal cancer risk-KARLA, July 6, 2020 - Hypertension Journal Report Medications commonly prescribed to treat high blood pressure may also reduce patients' colorectal cancer risk, according to new research published today in Hypertension, an American Heart Association journal." [4].     Low HbA1c - "Higher HbA1c levels (within both the non?diabetic and diabetic ranges) were associated with the risk of colorectal cancer (Model 2; P linear?=?0.009), especially for colon cancer." [5].    A healthy diet, exercise, limitation of alcohol use, certain infections, avoidance of radiation/environmental toxins, and staying lean lowers your cancer risk [6].     I want to empower you to make informed choices for your health. I have a listed below the most common blood components that we check for when you get blood work. I discuss what each component measures along with common reasons for why they can be abnormal. If you are interested in understanding your blood work better, please read the lab explanation attached below.     Explanation of Lab Results    Please note: This information is included as a reference to help you better understand your lab results and is not be used for diagnosis.     Lipid Panel:  Cholesterol is a measure of cardiac risk and stroke risk. A lipid panel measures total cholesterol and provides readings which are broken down into 3 subsections: Triglycerides, HDL and LDL.    Total Cholesterol: Your total blood cholesterol is a measure of LDL cholesterol, HDL cholesterol, and other lipid components.  If your individual lipid level components are in the normal range and you have an elevated total cholesterol level we are generally not to concerned since we primarily look at the " LDL cholesterol which is considered the bad cholesterol which can lead to heart attacks and strokes.  Your calculated cardiac risk is the most important factor in deciding if you would benefit from a cholesterol-lowering medication that the total cholesterol level.    Triglycerides are most diet and weight sensitive. They are affected easily by lifestyle changes. Ideally, this reading should be less than 150, although if the remainder of the cholesterol panel is within normal limits, we will tolerate upwards of 250-300. For the most part, if triglycerides are the only part of your cholesterol panel reading as 'abnormal', it is best to lose weight and modify your diet, including less saturated fat and less simple sugars. We only start medication to lower this is the level is greater than 500 since this can increase ones risk for pancreatitis. This is not the cholesterol type that leads to heart attacks.     HDL is your 'good cholesterol.' Ideally, the reading should be over 40 and is particularly helpful when it is greater than 60. Research indicates that high HDL is extremely protective; and if your HDL level is greater than 60, we tolerate far worse LDL or triglyceride levels. For the most part, HDL is responsive to aerobic activity and ideal weight. We do not have medicines that increase the HDL reading very easily.    LDL is your 'bad cholesterol.' Our goals depend on how many cardiac risk factors you have.     High LDL: If you are diabetic or have had a heart attack, we like the LDL level to be less than 100. If you have 2 cardiac risk factors (such as diabetes, hypertension, family history, a low HDL and smoking), we like your LDL to be less than 130. If you have 0-2 cardiac risk factors, we like your LDL level to be less than 160, but we may not necessarily initiate medications to 190 unless you cannot lower it. The latest guidelines recommend to consider taking a statin only if your ASCVD cardiac risk score  is at least greater than 7.5% and a strong recommendation if your risk score is greater than 10%.     Low LDL: Normal or low LDL is considered good and having an LDL below the normal range is not of a concern.     Complete Blood Count (CBC):    White blood cells: These are infection fighting cells. Mild fluctuations may represent minor illness at the time of blood draw. Marked fluctuations can represent immune diseases. This can also be elevated from smoking.     High WBC: Elevation in wbc can commonly be caused by an infection, steroid use, or any cause of inflammation such as many rheumatologic diseases, surgery, or trauma.      Low WBC: Many different things can cause this such as infections, medications, rheumatologic disorders, malignancies, nutritional deficiencies, and a normal variant in some individuals. If this occurs it is common practice to rule out a few viruses such as HIV, Hep C, B12, folate along with a a peripheral blood smear to look for abnormalities. If you are otherwise healthy with no concerning symptoms and the workup is negative we usually monitor it with yearly blood work.  If there are other abnormal cell line abnormalities sometimes we refer to hematology to further evaluate.    Hemoglobin: A key indicator for anemia. Ranges depend on age. If you are significantly out of this range, we may need to talk with you.    High Hemoglobin: This can be elevated in some blood disorders, sleep apnea, chronic lung disease, kidney disease, testosterone use, dehydration, smoking, along with some other rare causes.     Low Hemoglobin: Many things can lead to this but the most common cause is an iron deficiency in females who lose blood during their periods, decreased absorption due to antacids, poor diet, sickle cell, anemia of chronic disease, malignancy, bleeding from the gut, along with some other less common causes. If you are a young female who has periods it is usually assumed that this is from  that blood loss unless other symptoms or abnormal blood work is present. If you are above 45 and have an iron deficiency it is always recommended to get a colonoscopy to ensure that there is no lesion causing blood loss and to exclude malignancy.     Hematocrit: Another way of looking at anemia, much like your hemoglobin. If you are significantly out of this range, we may need to talk with you.    MCV: This looks at the size of your red blood cells.     High MCV:  A large number may indicate a B-12 deficiency, folate deficiency, alcohol use, liver disease, medication-induced phenomenon, or a need for further workup.    Low MCV: A small number may imply iron anemia or genetic disorder such as alpha-thalassemia. We may check iron studies called to see if you are low.    MCH: Much like MCV above.    Platelets: These are clotting factors. We tolerate a broad range in this. If your numbers are less than 100, we may need to work it up.     High Platelet Count: If your numbers are elevated, this could represent ongoing inflammation within the body which can be caused by any infection, illness, iron deficiency, or other malignancy. We usually recheck it to monitor for improvement and if it does not resolve will work it up with more blood work.     Low Platelet Count: Many things can cause this such as infections, alcohol use, medications, liver disease, vitamin deficiencies, aspleenia, and some other rare blood disorders. If it persists many times we refer to hematology if a cause is not identified.     Iron:  This is not a reliable blood marker since this fluctuates throughout the day and if you are fasting many times your iron level in your blood is low but this does not necessarily mean you have a deficiency.  There are more reliable ways to measure your iron stores and these are discussed below.    Transferrin:  Many things can cause an abnormal result and this is not as important as some other labs mentioned  below.    TIBC:  This is the total iron-binding capacity and this measures the total amount of iron that can be bound by proteins in the blood.  If you have an elevated TIBC this is a good marker that you could be low on iron and this is useful blood marker.  A simple way to think of this is seats in a car. The TIBC is the number of open seats that iron can sit in. If you have a high TIBC (number of open seats) that means you have a low iron level.     Ferritin:  A low ferritin is almost always caused from an iron deficiency.  A normal ferritin level does not need necessarily exclude an iron deficiency since many inflammatory conditions such as kidney disease, diabetes, arthritis, and obesity can raise this level hiding an iron deficiency.  If you are healthy with no inflammatory conditions this is a very reliable test for detecting an iron deficiency since nothing else lowers your ferritin level except a low iron level. Keep in mind that you can have an iron deficiency if your ferritin level is normal but your TIBC is elevated.  If you have a low iron level the next step is always to identify why you have a low iron level.    CMP (Comprehensive Metabolic Panel):  Broadly, this is a test of organ function including the kidneys, liver, and electrolyte levels.    Glucose: This is primarily looking for diabetes. We like your blood glucose level to be less than 100 when fasting. Readings of 100-125 indicate what we call 'pre-diabetes' or 'glucose intolerance.' This does not necessarily indicate diabetes, but we may check another test called a hemoglobin A1C for confirmation. This level puts you at great risk for becoming a true diabetic and we would encourage the reduction of simple sugars and processed white flour as well as appropriate weight loss. If this number is greater than 125, it is likely you are diabetic; we will get an additional hemoglobin A1C test and will likely schedule you for an appointment. If you  notice that your blood glucose is above 125 and we have not scheduled a follow-up appointment, please call us. Patients who are known diabetics can have readings greater than 125.    Urea Nitrogen: This is a kidney function and maybe elevated because of mild dehydration or because of excessive muscle breakdown from aggressive exercise habits.    Creatinine: This also is a kidney function test. It may be mildly elevated if you have particularly large muscle bulk or taking a supplement like creatine. It is related to the GFR. It is a muscle product that we track to look at your kidney function. If this is elevated and new, we may need to talk with you. If you have had this mildly elevated in the past, it is likely that we will just track it to ensure that it does not worsen quickly. Some medications may gently worsen this, namely blood pressure pills called ace inhibitors. To some degree, we will permit levels of up to 1.6.    Sodium: This is essentially the concentration of salt within the body. This may be mildly low because of dehydration, overhydration, diuretics, .    Potassium: This is an electrolyte that can cause muscular cramping or cardiac difficulties. It is sometimes lowered by diuretic medications.    Chloride: For the most part, this is only relevant if the other electrolytes are abnormal.    CO2: This is a function of acid balance within the body. For the most part, mild abnormalities are not important and may represent a starvation or dehydration state when blood was drawn. Many medications can change this level as well such as diuretics, acetazolamide, calcium carbonate, laxatives, aspirin, and many others.    High CO2: Many things can cause this which includes dehydration, sleep apnea, and COPD.     Low CO2: Many things can lead to a low level and if you are generally healthy we are usually  not concerned. Diarrhea, renal disease, diabetes, and many medications can cause this.     Anion Gap: Only  relevant if your CO2 is abnormal.    Calcium: This is not related to dietary intake of calcium. It may fluctuate gently based on the amount of protein within your body. If it is above 10.9, we may need to do additional testing. Many times if low the albumin level is low and once the corrected calcium level is calculated the calcium is in a normal range.     Total protein: This looks at protein within the body. Markedly elevated levels can represent an immune response and may require further workup.    Albumin: This may be elevated because of particularly high level of fitness. If it is markedly suppressed, it may represent organ dysfunction, particularly in the liver or kidneys.    AST and ALT: These are enzymes in the liver and if elevated can indicate liver damage.     Elevated AST/ALT: Many things can caused elevated liver enzymes and the most common reason is viral infections, alcohol use, medications, supplements, autoimmune, along with some other rare causes. One of the most common reasons for a mild elevation in liver enzymes (less than 3 times the upper limit) is fatty liver disease. Many individuals who have extra fat in their diet store this in the liver and this can build up and cause a mild elevation. This is usually diagnosed with a liver ultrasound and exclusion of other causes. The only treatment for this is diet and exercise along with avoidance of liver toxic medications and alcohol. It is standard of care to rule our viral hepatitis and get imaging of the liver if elevated. This is monitored and if I feel concerned will refer to hepatology.     Alk Phos: Part of liver function or bones    High Alk Phos: elevated levels may indicate a liver injury or obstruction of bile flow. Elevated levels can also be seen in vitamin D deficiency, drug induced, or bone disorders.     Low Alk Phos: In most cases having a low Alkaline Phosphatase enzyme activity is not due to any disease and is simply a normal  variant.  Having an elevated Alk Phos is more concerning and associated with many diseases and thus I would not be overly concerned with a low level which is seen in many health individuals. The reasons for a low serum alkaline phosphatase activity were reviewed in a 1-yr retrospective study and in this study it was found that no cause was found in most cases.  Low activity values were recorded in several individuals in the absence of any obvious cause. This would suggest that the definition of the lower limit of the reference range for alkaline phosphatase is arbitrary, thus limiting the use of low serum activity as a marker of disease.  In some cases micronutrients like Zinc (Zn) and Magnesium (Mg) are causes of low ALP activity and you can take zinc or magnesium supplements. Unfortunately, the blood work to measure zinc and magnesium levels are unreliable and not very accurate since it does not test for the intracellular zinc or magnesium levels.     Jenna BUTTD, Silas BAILEY, Johanna NIEVES. Clinical significance of a low serum alkaline phosphatase. Net J Med. 1992 Feb;40(1-2):9-14. PMID: [id number].  Gama C. S, Irineo B, Rosalba I, Behera S, Gama S. Low Alkaline Phosphatase (ALP) In Adult Population an Indicator of Zinc (Zn) and Magnesium (Mg) Deficiency. Curr Res Nutr Food Sci 2017;5(3). doi : http://dx.[email or web address]    Total Bilirubin: This is also part of liver function.    High T Bili: If you have right upper quadrant pain an elevation in total bilirubin can be caused by a gallbladder stone that is blocking the biliary tract that leads to your gastrointestinal tract. If you have fever and right upper quadrant pain this can sometimes be elevated when your gallbladder is infected and most individuals have nausea with vomiting associated with it. If you have no symptoms and are otherwise healthy this can be caused by Gilbert syndrome which is a benign normal variant. There are other causes such as  "some anemias but there would be abnormal blood counts.     Low T Bili: Nothing to be concerned about.     Thyroid Stimulating Hormone (TSH): This reading is an indicator of your thyroid function. The thyroid regulates energy levels throughout the entire body, affecting almost every organ system. This is an inverse relationship so a high number actually presents a low thyroid. If this is abnormal, we will often check an additional lab called a Free T4 to evaluate this more carefully. Borderline elevations, those of 5-10, can be watched or worked up further. Please do not take the supplement Biotin for at least a week prior to getting your TSH checked since this can lead to false measurement levels.     Prostate Specific Antigen (PSA): (Men only.) This is a prostate cancer screening test, and is no longer a routine screening test. Levels are truly a function of age. Being less than 4 is typical for someone more in their 60s. If you are young, it should probably be less than 3. A higher PSA result does not necessarily mean that you have cancer, but may indicate a need for a discussion with your provider. Options include observation to look at rate of rise or prostate biopsy. Not only is the absolute value important, but how much it has changed from previous years. Please ensure that there is not a dramatic rise from previous years.    Please Note: This information is included as a reference to help you better understand your lab results and is not be used for diagnosis.    Frequently asked questions    When should I take my blood pressure medication?    The latest studies show that taking your blood pressure medication at night is the best time. A recent study showed that this prevents more heart attacks and strokes. See the answer below from [employer]     "Q. I've taken blood pressure medicines every morning for many years, and they keep my pressure under control. Recently, my doctor recommended taking them at bedtime, " "instead. Does that make sense?    A. It actually does make sense -- based on recent research. For many years, there have been at least three theoretical reasons for taking blood pressure medicines before bedtime. First, a body system that strongly affects blood pressure, called the renin-angiotensin system, has its peak activity during sleep. Second, circadian rhythms cause differences in the body chemistry at night compared with daytime. Third, most heart attacks occur in the morning, before medicines taken in the morning have a chance to "kick in."" [6].     When should I take my cholesterol medication?    It used to be recommended to take your cholesterol medication at night since the original statins that lowered cholesterol did not last 24 hours and most cholesterol synthesis is done at night. The long acting statins such as atorvastatin and rosuvastatin last 24 hours so they can be taken any time during the day. Simvastatin, pravastatin, fluvastatin, and lovastatin are shorter acting and should be taken at bed time.     Can supplements affect my blood work?    Yes they can. A very important supplement to not take for at least a week prior to your blood work is biotin. "Biotin supplement use is common and can lead to the false measurement of thyroid hormone in commonly used assays." [8.]    What are conditions that should not be addressed during a virtual visit?    There are some conditions that should not be evaluated via a virtual visit since optimal care is impossible. Chest pain, shortness of breath, lung conditions, abdominal pain, and any neurological complaint such as headache, dizziness, numbness ect.         When will I get commentary of my blood work?    I review all blood work that you get and I will send out commentary on this via [employer] within 72 hours. In most cases you will get a message from me sooner, but many times not all of the blood work is completed thus I usually wait until all results " have returned. If there is a critical abnormality you should be contacted the same day you got blood work.     How frequently do I need to have visits to get controlled substances?    It is standard protocol to have a visit every 3 months if you are taking scheduled substances such as ADHD medications, psychiatric medications, and pain medications. This is to ensure your safety and monitor for any side effects.     When should I bring prior to a visit if I want to lose weight?    I recommend that you make a food diary for a week and fill out what you ate each day. You can bring this form in to your visit and I can look over it to suggest changes that you can make.     Which over the counter medications should I avoid if I have decreased kidney function?    NSAIDs which includes ibuprofen (Advil, Motrin, Nuprin), naproxen (Aleve), meloxicam (Mobic) and diclofenac(Zorvolex, Voltaren) and ketorolac (Toradol) can damage your kidneys if you take this long term.Tylenol does not affect your kidney and thus is safe as long as you don't have liver disease.     Is there an [facility]?     Yes! The [facility] is located on the first floor of the [facility]. The address is listed below. You can get curbside pickup if you call their number at [phone]. One of the many benefits of using the [facility] pharmacy is that the pharmacists can contact me directly if a cheaper alternative is available to save you money. They also see your note to know more about what the medication was prescribed for. I recommend this pharmacy since communication with me is quick in case any confusion arises on your medications.     [facility].  [address] [place] [id number]  Phone: [phone]    Hours:  Monday - Friday  8:00 a.m. - 5:30 a.m.    Why is it not in my best interest to call in order to get an antibiotic?    Medicine is a complex field and many times the correct diagnosis is critical in order to provide the correct care.  One of the most important goals of a healthcare provider is to ensure that no dangerous condition is masquerading as a mild illness. Specific questions are very important to obtain during an examination that provide a wealth of information to understand your illness. Health care providers are trained to investigate for signs that can be dangerous to your health. Messaging or calling the office in order to get an antibiotic can be very dangerous.     For example, many urinary tract infections can lead to an infection in the kidney that can result in a serious blood stream infection that can lead to hospitalization if not recognized. A cough can be caused by many different things and not necessarily an infection. It is not uncommon that one assumes a cough is from an infection when it is actually caused by a blood clot in the lungs. This can lead to death. Determining your risk can only be performed after a thorough history and examination. A few sentences through e-mail is not enough.     What are some common symptoms that should be evaluated by the emergency department and not by phone or e-mail?    This does not include every symptom, but common examples of symptoms that should prompt one to go to the emergency department are chest pain, chest pressure, shortness of breath, difficulty breathing, abdominal pain, weakness or numbness or an extremity, sudden weakness or drooping on one side of the body, speaking difficulty, unusual or bad headache (particularly if it started suddenly), head injury, confusion, seizure, passing out, lightheaded, pain in arm or jaw, suddenly not able to speak, see, walk, or move, dizziness, neck stiffness, suicidal thoughts, testicular pain, cuts and wounds, severe pain, along with many others. This is not an inclusive list.     Outside Records    It is common to have an colonoscopy, mammogram, PAP smear, or eye exam done outside the [facility] system. Many times we do not get the records  "automatically sent to us. Please call your provider's office to notify them to fax us your records so that we can have the most up to date information. Your provider will review your outside results in order to provide you with the complete care that you deserve. We appreciate that you decided to choose us to be serving on your healthcare team and the more information we have about your health is essential.     If I have a psychiatric crisis what should I do?    If you ever feel that there is a risk of a harm to yourself we recommend to go to the emergency room. There is a National Suicide Prevention lifeline number 2-767-601-TALK which route you to the nearest crisis center. There is also a suicide hotline 3-910-YGJGXSA (1-449.163.6740).    Patient Education       Checking Your Blood Pressure at Home   The Basics   Written by the doctors and editors at [facility]   How is blood pressure measured? -- Blood pressure is usually measured with a device that goes around your upper arm. This is often done in a doctor's office. But some people also check their blood pressure themselves, at home or at work.  Blood pressure is explained with 2 numbers. For instance, your blood pressure might be "140 over 90." The first (top) number is the pressure inside your arteries when your heart is heidi. The second (bottom) number is the pressure inside your arteries when your heart is relaxed. The table shows how doctors and nurses define high and normal blood pressure (table 1).  If your blood pressure gets too high, it puts you at risk for heart attack, stroke, and kidney disease. High blood pressure does not usually cause symptoms. But it can be serious.  What is a home blood pressure meter? -- A home blood pressure meter (or "monitor") is a device you can use to check your blood pressure yourself. It has a cuff that goes around your upper arm (figure 1). Some devices have a cuff that goes around your wrist instead. But doctors " aren't sure if these work as well. The meter also has a small screen, or dial, that shows your blood pressure numbers.  There are also special meters you can wear for a day or 2. These are different because they automatically check your blood pressure throughout the day and night, even while you are sleeping. If your doctor thinks you should use one of these devices, they will talk to you about how to wear it.  Why do I need to check my blood pressure at home? -- If your doctor knows or suspects that you have high blood pressure, they might want you to check it at home. There are a few reasons for this. Your doctor might want to look at:  Whether your blood pressure measures the same at home as it did in the doctor's office  How well your blood pressure medicines are working  Changes in your blood pressure, for example, if it goes up and down  People who check their own blood pressure at home usually do better at keeping it low.  How do I choose a home blood pressure meter? -- When choosing a home blood pressure meter, you will probably want to think about:  Cost - Some devices cost more than others. You should also check to see if your insurance will help pay for your device.  Size - It's important to make sure the cuff fits your arm comfortably. Your doctor or nurse can help you with this.  How easy it is to use - You should make sure you understand how to use the device. You also need to be able to read the numbers on the screen.  You do not need a prescription to buy a home blood pressure meter. You can buy them at most pharmacies or over the internet. Your doctor or nurse can help you choose the right device for you.  How do I check my blood pressure at home? -- Once you have a home blood pressure meter, your doctor or nurse should check it to make sure it fits you and works correctly.  When it's time to check your blood pressure:  Go to the bathroom and empty your bladder first. Having a full bladder can  temporarily increase your blood pressure, making the results inaccurate.  Sit in a chair with your feet flat on the ground.  Try to breathe normally and stay calm.  Attach the cuff to your arm. Place the cuff directly on your skin, not over your clothing. The cuff should be tight enough to not slip down, but not uncomfortably tight.  Sit and relax for about 3 to 5 minutes with the cuff on.  Follow the directions that came with your device to start measuring your blood pressure. This might involve squeezing the bulb at the end of the tube to inflate the cuff (fill it with air). With some monitors, you just need to press a button to inflate the cuff. When the cuff fills with air, it feels like someone is squeezing your arm, but it should not hurt. Then you will slowly deflate the cuff (let the air out of it), or it will deflate by itself. The screen or dial will show your blood pressure numbers.  Stay seated and relax for 1 minute, then measure your blood pressure again.  How often should I check my blood pressure? -- It depends. Different people need to follow different schedules. Your doctor or nurse will tell you how often to check your blood pressure, and when. Some people need to check their blood pressure twice a day, in the morning and evening.  Your doctor or nurse will probably tell you to keep track of your blood pressure for at least a few days (table 2). Then they will look at the numbers. The reason for this is that it's normal for your blood pressure to change a bit from day to day. For example, the numbers might change depending on whether you recently had caffeine, just exercised, or feel stressed. Checking your blood pressure over several days - or longer - will give your doctor or nurse a better idea of what is average for you.  How should I keep track of my blood pressure? -- Some blood pressure meters will record your numbers for you, or send them to your computer or smartphone. If yours does not  "do this, you will need to write them down. Your doctor or nurse can help you figure out the best way to keep track of the numbers.  What if my blood pressure is high? -- Your doctor or nurse will tell you what to do if your blood pressure is high when you check it at home. If you get a number that is higher than normal, measure it again to see if it is still high. If it is very high (above a certain number, which your doctor or nurse will tell you to watch out for), you should call your doctor right away.  If your blood pressure is only a little high, your doctor or nurse might tell you to keep checking it for a few more days or weeks, and then call if it does not go back down. Then they can help you decide what to do next.  All topics are updated as new evidence becomes available and our peer review process is complete.  This topic retrieved from [employer] on: Sep 21, 2021.  Topic 709321 Version 4.0  Release: 29.4.2 - C29.263  © 2021 UpToDate, Inc. and/or its affiliates. All rights reserved.  table 1: Definition of normal and high blood pressure  Level  Top number  Bottom number    High 130 or above 80 or above   Elevated 120 to 129 79 or below   Normal 119 or below 79 or below   These definitions are from the American College of Cardiology/American Heart Association. Other expert groups might use slightly different definitions.  "Elevated blood pressure" is a term doctor or nurses use as a warning. It means you do not yet have high blood pressure, but your blood pressure is not as low as it should be for good health.  Graphic 01490 Version 6.0  figure 1: Using a home blood pressure meter     This is an example of a person using a home blood pressure meter.  Graphic 567339 Version 1.0    Consumer Information Use and Disclaimer   This information is not specific medical advice and does not replace information you receive from your health care provider. This is only a brief summary of general information. It does NOT " include all information about conditions, illnesses, injuries, tests, procedures, treatments, therapies, discharge instructions or life-style choices that may apply to you. You must talk with your health care provider for complete information about your health and treatment options. This information should not be used to decide whether or not to accept your health care provider's advice, instructions or recommendations. Only your health care provider has the knowledge and training to provide advice that is right for you. The use of this information is governed by the [employer] End User License Agreement, available at https://www.[employer] Britannia/en/solutions/Meteor/about/grace.The use of "[employer]" content is governed by the "[employer]" Terms of Use. ©2021 UpToDate, Inc. All rights reserved.  Copyright   © 2021 UpToDate, Inc. and/or its affiliates. All rights reserved.      Daily Blood Pressure Log    Please print this form to assist you in keeping track of your blood pressure at home.      Name:  Date of Birth:       Average Blood Pressure:           Date: Time  (a.m.) Blood  Pressure: Pulse  Rate: Time  (p.m.) Blood  Pressure : Pulse  Rate: Comments:   Sample 8:37 127/83 84    Stressful morning                                                                                                                                                                                                     Wishing you good health,     Orlando Mohan MD     References:  1-https://www.[employer].Storyvine/speakers/darian_nelson  2-https://www.[employer]il.com.au/news/qewuj-ccy-89-cancers-that-can-yq-jjoies-qe-smoking/  3-https://www.cdc.gov/cancer/lung/basic_info/screening.htm  4-https://newsroom.heart.org/news/common-hypertension-medications-may-reduce-colorectal-cancer-risk  5-Goto A, Teddy M, Sawada N, et al. High hemoglobin A1c levels within the non-diabetic range are associated with the risk of all cancers. Int J Cancer. 2016;138(7):1965-2966.  doi:10.1002/ijc.92687  6-https://www.[facility].[place].edu/newsletter_article/the-10-commandments-of-cancer-prevention  7-https://www.[facility].[place].edu/diseases-and-conditions/should-i-take-blood-pressure-medications-at-night  8-Chantel MARTINEZ et al 2018 Prevalence of biotin supplement usage in outpatients and plasma biotin concentrations in patients presenting to the emergency department. Clin Biochem. Epub 2018 Jul 20. PMID: [id number].

## 2022-07-22 NOTE — PROGRESS NOTES
[facility] Note    Subjective:    The HPI and pertinent ROS is included in the Diagnostic Impression Remarks section at the end of the note. Please see below for further details. Chief complaint is at end of note.     Medication List with Changes/Refills   Current Medications    CELECOXIB (CELEBREX) 200 MG CAPSULE    Take 200 mg by mouth.    ESTRADIOL (VIVELLE-DOT) 0.1 MG/24 HR PTSW    Place 1 patch onto the skin twice a week.    METHYLPREDNISOLONE (MEDROL DOSEPACK) 4 MG TABLET    use as directed    NAPROXEN (NAPROSYN) 500 MG TABLET    TAKE 1 TABLET(500 MG) BY MOUTH TWICE DAILY    OMEPRAZOLE (PRILOSEC) 40 MG CAPSULE    Take 1 capsule (40 mg total) by mouth once daily.     Modified Medications    No medications on file       Data reviewed 274}  Previous medical records reviewed and summarized in plan section at end of note.      If you are due for any health screening(s) below please notify me so we can arrange them to be ordered and scheduled to maintain your health.     Tests to Keep You Healthy    Mammogram: ORDERED BUT NOT SCHEDULED  Colon Cancer Screening: Met    The following portions of the patient's history were reviewed and updated as appropriate: allergies, current medications, past family history, past medical history, past social history, past surgical history and problem list.    She  has a past medical history of Asthma, Foot pain, GERD (gastroesophageal reflux disease), Ovarian mass, Pelvic mass, Pelvic pain, and Plantar fasciitis.  She  has a past surgical history that includes Tubal ligation (Bilateral); Foot surgery; Colonoscopy (N/A, 9/21/2021); Robot-assisted laparoscopic hysterectomy (N/A, 10/28/2021); Robot-assisted laparoscopic salpingo-oophorectomy (Bilateral, 10/28/2021); and Cystoscopy (N/A, 10/28/2021).    She  reports that she has never smoked. She has never used smokeless tobacco. She reports previous alcohol use of about 1.0 standard drink of alcohol per week. She  "reports that she does not use drugs.  She family history includes Arthritis in her mother; Heart disease in her father; Hypertension in her father; Kidney disease in her father.    Review of patient's allergies indicates:  No Known Allergies    Tobacco Use: Low Risk     Smoking Tobacco Use: Never Smoker    Smokeless Tobacco Use: Never Used     Physical Examination  General appearance: alert, cooperative, no distress  Neck: no thyromegaly, no neck stiffness  Lungs: clear to auscultation, no wheezes, rales or rhonchi, symmetric air entry  Heart: normal rate, regular rhythm, normal S1, S2, no murmurs, rubs, clicks or gallops  Abdomen: soft, nontender, nondistended, no rigidity, rebound, or guarding.   Back: no point tenderness over spine  Extremities: peripheral pulses normal, no unilateral leg swelling or calf tenderness   Neurological:alert, oriented, normal speech, no new focal findings or movement disorder noted from baseline    BP Readings from Last 3 Encounters:   07/22/22 114/82   12/01/21 136/80   10/28/21 128/64     Wt Readings from Last 3 Encounters:   07/22/22 87 kg (191 lb 12.8 oz)   01/03/22 87.1 kg (192 lb)   12/01/21 87.3 kg (192 lb 7.4 oz)     /82 (BP Location: Right arm, Patient Position: Sitting, BP Method: Large (Manual))   Pulse 98   Temp (!) 103 °F (39.4 °C) (Oral)   Resp 14   Ht 5' 3" (1.6 m)   Wt 87 kg (191 lb 12.8 oz)   LMP 08/01/2021 (Approximate)   SpO2 96%   BMI 33.98 kg/m²    274}  Laboratory: I have reviewed old labs below:    274}    Lab Results   Component Value Date    WBC 14.75 (H) 10/28/2021    HGB 11.7 (L) 10/28/2021    HCT 37.0 10/28/2021    MCV 89 10/28/2021     10/28/2021     07/16/2021    K 4.4 07/16/2021     07/16/2021    CALCIUM 9.7 07/16/2021    CO2 27 07/16/2021    GLU 71 07/16/2021    BUN 12 07/16/2021    CREATININE 0.8 07/16/2021    ANIONGAP 8 07/16/2021    ESTGFRAFRICA >60.0 07/16/2021    EGFRNONAA >60.0 07/16/2021    PROT 7.8 07/16/2021 " "   ALBUMIN 3.6 07/16/2021    BILITOT 0.4 07/16/2021    ALKPHOS 54 (L) 07/16/2021    ALT 18 07/16/2021    AST 19 07/16/2021    CHOL 210 (H) 07/16/2021    TRIG 134 07/16/2021    HDL 66 07/16/2021    LDLCALC 117.2 07/16/2021    TSH 0.887 07/24/2020    HGBA1C 5.6 07/16/2021     Lab reviewed by me: Particular labs of significance that I will monitor, workup, or treat to improve are mentioned below in diagnostic impression remarks.    Imaging/EKG: I have reviewed the pertinent results and my findings are noted in remarks.  274}    CC:   Chief Complaint   Patient presents with    Annual Exam    Allergies        274}    Assessment/Plan  Joselin Phillips is a 51 y.o. female who presents to clinic with:  1. Left Achilles tendinitis    2. Plantar fasciitis    3. Mixed hyperlipidemia    4. Encounter for preventive health examination    5. Abnormal finding of blood chemistry, unspecified    6. Encounter for screening mammogram for malignant neoplasm of breast       274}  Diagnostic Impression Remarks + HPI     Documentation entered by me for this encounter may have been done in part using speech-recognition technology. Although I have made an effort to ensure accuracy, "sound like" errors may exist and should be interpreted in context.      Preventive-will get blood work follow-up on results also needs mammogram she reports she get with work   Normocytic anemia-etiology unclear will get some blood work to evaluate   Achilles tendinitis-needs improvement recommend try Voltaren cream follow-up with Podiatry can also consider nitroglycerin patch but talk with Podiatry about this as well   Hyperlipidemia-control uncertain repeat blood work monitor recommend healthy diet       This is the extent of the patient's concerns at this present time. She did not feel chest pain upon exertion, dyspnea, nausea, vomiting, diaphoresis, or syncope. No pleuritic chest pain, unilateral leg swelling, calf tenderness, or calf pain. " Joselin will return to clinic in a few months for further workup and reassessment or sooner as needed. She was instructed to call the clinic or go to the emergency department if her symptoms do not improve, worsens, or if new symptoms develop. As we discussed that symptoms could worsen over the next 24 hours she was advised that if any increased swelling, pain, or numbness arise to go immediately to the ED. Patient knows to call any time if an emergency arises. Shared decision making occurred and she verbalized understanding in agreement with this plan. I discussed imaging findings, diagnosis, possibilities, treatment options, medications, risks, and benefits. She had many questions regarding the options and long-term effects. All questions were answered. She expressed understanding after counseling regarding the diagnosis and recommendations. She was capable and demonstrated competence with understanding of these options. Shared decision making was performed resulting in her choosing the current treatment plan. Patient handout was given with instructions and recommendations. Advised the patient that if they become pregnant to alert us immediately to assess for medication changes. I also discussed the importance of close follow up to discuss labs, change or modify her medications if needed, monitor side effects, and further evaluation of medical problems.     Additional workup planned: see labs ordered below.    See below for labs and meds ordered with associated diagnosis      1. Left Achilles tendinitis    2. Plantar fasciitis    3. Mixed hyperlipidemia  - Lipid Panel; Future    4. Encounter for preventive health examination  - CBC Auto Differential; Future  - Comprehensive Metabolic Panel; Future  - Hemoglobin A1C; Future  - TSH; Future    5. Abnormal finding of blood chemistry, unspecified  - CBC Auto Differential; Future  - Comprehensive Metabolic Panel; Future  - Hemoglobin A1C; Future  - TSH; Future  -  "Iron and TIBC; Future  - Ferritin; Future  - Folate; Future  - Vitamin B12 Deficiency Panel; Future    6. Encounter for screening mammogram for malignant neoplasm of breast  - Mammo Digital Screening Bilat; Future      Orlando Mohan MD   { FM Notes     Chart Review      :[id number]::"   "}274}    Tests to Keep You Healthy    Mammogram: ORDERED BUT NOT SCHEDULED  Colon Cancer Screening: Met    "

## 2022-07-25 ENCOUNTER — OFFICE VISIT (OUTPATIENT)
Dept: PODIATRY | Facility: CLINIC | Age: 52
End: 2022-07-25
Payer: COMMERCIAL

## 2022-07-25 ENCOUNTER — PATIENT MESSAGE (OUTPATIENT)
Dept: FAMILY MEDICINE | Facility: CLINIC | Age: 52
End: 2022-07-25
Payer: COMMERCIAL

## 2022-07-25 VITALS
OXYGEN SATURATION: 98 % | HEART RATE: 86 BPM | RESPIRATION RATE: 18 BRPM | BODY MASS INDEX: 33.84 KG/M2 | WEIGHT: 191 LBS | HEIGHT: 63 IN

## 2022-07-25 DIAGNOSIS — M25.572 PAIN OF JOINT OF LEFT ANKLE AND FOOT: ICD-10-CM

## 2022-07-25 DIAGNOSIS — M25.572 CHRONIC PAIN OF LEFT ANKLE: ICD-10-CM

## 2022-07-25 DIAGNOSIS — M67.88 ACHILLES TENDINOSIS OF LEFT LOWER EXTREMITY: Primary | ICD-10-CM

## 2022-07-25 DIAGNOSIS — M79.671 RIGHT FOOT PAIN: ICD-10-CM

## 2022-07-25 DIAGNOSIS — G89.29 CHRONIC PAIN OF LEFT ANKLE: ICD-10-CM

## 2022-07-25 PROCEDURE — 1160F RVW MEDS BY RX/DR IN RCRD: CPT | Mod: CPTII,S$GLB,, | Performed by: PODIATRIST

## 2022-07-25 PROCEDURE — 1159F MED LIST DOCD IN RCRD: CPT | Mod: CPTII,S$GLB,, | Performed by: PODIATRIST

## 2022-07-25 PROCEDURE — 3008F BODY MASS INDEX DOCD: CPT | Mod: CPTII,S$GLB,, | Performed by: PODIATRIST

## 2022-07-25 PROCEDURE — 99213 OFFICE O/P EST LOW 20 MIN: CPT | Mod: S$GLB,,, | Performed by: PODIATRIST

## 2022-07-25 PROCEDURE — 99213 PR OFFICE/OUTPT VISIT, EST, LEVL III, 20-29 MIN: ICD-10-PCS | Mod: S$GLB,,, | Performed by: PODIATRIST

## 2022-07-25 PROCEDURE — 3044F HG A1C LEVEL LT 7.0%: CPT | Mod: CPTII,S$GLB,, | Performed by: PODIATRIST

## 2022-07-25 PROCEDURE — 1159F PR MEDICATION LIST DOCUMENTED IN MEDICAL RECORD: ICD-10-PCS | Mod: CPTII,S$GLB,, | Performed by: PODIATRIST

## 2022-07-25 PROCEDURE — 1160F PR REVIEW ALL MEDS BY PRESCRIBER/CLIN PHARMACIST DOCUMENTED: ICD-10-PCS | Mod: CPTII,S$GLB,, | Performed by: PODIATRIST

## 2022-07-25 PROCEDURE — 3008F PR BODY MASS INDEX (BMI) DOCUMENTED: ICD-10-PCS | Mod: CPTII,S$GLB,, | Performed by: PODIATRIST

## 2022-07-25 PROCEDURE — 3044F PR MOST RECENT HEMOGLOBIN A1C LEVEL <7.0%: ICD-10-PCS | Mod: CPTII,S$GLB,, | Performed by: PODIATRIST

## 2022-07-25 NOTE — PATIENT INSTRUCTIONS
Understanding Plantar Fasciitis    Plantar fasciitis is a condition that causes foot and heel pain. The plantar fascia is a tough band of tissue that runs across the bottom of the foot from the heel to the toes. This tissue pulls on the heel bone. It supports the arch of the foot as it pushes off the ground. If the tissue becomes irritated or red and swollen (inflamed), it is called plantar fasciitis.  How to say it  PLAN-tuhr fa-see-IY-tis     What causes plantar fasciitis?  Plantar fasciitis most often occurs from overusing the plantar fascia. The tissue may become damaged from activities that put repeated stress on the heel and foot. Or it may wear down over time with age and ankle stiffness. You are more likely to have plantar fasciitis if you:  Do activities that require a lot of running, jumping, or dancing  Have a job that requires being on your feet for long periods  Are overweight or obese  Have certain foot problems, such as a tight Achilles tendon, flat feet, or high arches  Often wear poorly fitting shoes    Symptoms of plantar fasciitis  The condition most often causes pain in the heel and the bottom of the foot. The pain may occur when you take your first steps in the morning. It may get better as you walk throughout the day. But as you continue to put weight on the foot, the pain often returns. Pain may also occur after standing or sitting for long periods.    Treating plantar fasciitis  Treatments for plantar fasciitis include:  Resting the foot. This involves limiting movements that make your foot hurt. You may also need to avoid certain sports and types of work for a time.  Using cold packs. Put an ice pack on the heel and foot to help reduce pain and swelling.  Taking pain medicines. Prescription and over-the-counter pain medicines can help relieve pain and swelling.  Using heel cups or foot inserts (orthotics). These are placed in the shoes to help support the heel or arch and cushion the heel.  You may also be told to buy proper-fitting shoes with good arch support and cushioned soles.  Taping the foot. This supports the arch and limits the movement of the plantar fascia to help relieve symptoms.  Wearing a night splint. This stretches the plantar fascia and leg muscles while you sleep. This may help relieve pain.  Doing exercises and physical therapy. These stretch and strengthen the plantar fascia and the muscles in the leg that support the heel and foot.  Getting shots of medicine into the foot. These may help relieve symptoms for a time.  Having surgery. This may be needed if other treatments fail to relieve symptoms. During surgery, the surgeon may partially cut the plantar fascia to release tension.    Possible complications of plantar fasciitis  Without proper care and treatment, healing may take longer than normal. Also, symptoms may continue or get worse. Over time, the plantar fascia may be damaged. This can make it hard to walk or even stand without pain.    When to call your healthcare provider  Call your healthcare provider right away if you have any of these:  Fever of 100.4°F (38°C) or higher, or as directed  Symptoms that dont get better with treatment, or get worse  New symptoms, such as numbness, tingling, or weakness in the foot     Date Last Reviewed: 3/10/2016  © 8982-2974 [employer]. [address] [place] [address], [place] [address]. All rights reserved. This information is not intended as a substitute for professional medical care. Always follow your healthcare professional's instructions.       Achilles Tendinitis    Achilles tendinitis is a common condition that occurs when the large tendon that runs down the back of your lower leg becomes irritated and inflamed.  The Achilles tendon is the largest tendon in the body. It connects your calf muscles to your heel bone and is used when you walk, run, climb stairs, jump, and stand on your tip toes. Although the Achilles tendon  can withstand great stresses from running and jumping, it is also prone to tendinitis, a condition associated with overuse and degeneration.       Achilles tendinitis pain can occur within the tendon itself or at the point where it attaches to the heel bone, called the Achilles tendon insertion.    Description  Simply defined, tendinitis is inflammation of a tendon. Inflammation is the body's natural response to injury or disease, and often causes swelling, pain, or irritation. There are two types of Achilles tendinitis, based upon which part of the tendon is inflamed.    Noninsertional Achilles tendinitis    Noninsertional Achilles Tendinitis  In noninsertional Achilles tendinitis, fibers in the middle portion of the tendon have begun to break down with tiny tears (degenerate), swell, and thicken.  Tendinitis of the middle portion of the tendon more commonly affects younger, active people.    Insertional Achilles Tendinitis    Insertional Achilles tendinitis    Insertional Achilles tendinitis involves the lower portion of the heel, where the tendon attaches (inserts) to the heel bone.    In both noninsertional and insertional Achilles tendinitis, damaged tendon fibers may also calcify (harden). Bone spurs (extra bone growth) often form with insertional Achilles tendinitis.    Tendinitis that affects the insertion of the tendon can occur at any time, even in patients who are not active. More often than not, however, it comes from years of overuse (long distance runners, sprinters).    Cause  Achilles tendinitis is typically not related to a specific injury. The problem results from repetitive stress to the tendon. This often happens when we push our bodies to do too much, too soon, but other factors can make it more likely to develop tendinitis, including:  Sudden increase in the amount or intensity of exercise activity--for example, increasing the distance you run every day by a few miles without giving your body a  chance to adjust to the new distance  Tight calf muscles--Having tight calf muscles and suddenly starting an aggressive exercise program can put extra stress on the Achilles tendon  Bone spur--Extra bone growth where the Achilles tendon attaches to the heel bone can rub against the tendon and cause pain

## 2022-07-25 NOTE — PROGRESS NOTES
"  [address] [facility] [address]  [place] [address]  Phone: [phone]   [phone]    Patient's PCP:Orlando Mohan MD  Referring Provider: No ref. provider found    Subjective:      Chief Complaint:: Follow-up (Left foot achilles tendinitis )    SATISH Phillips is a 51 y.o. female who presents today  For follow-up on with complaint of achilles tendinitis. Pain has waxed and weaned from aching to sharp throbbing burning and pulling. States she has been back in the cam walker boot for the past week and taking NSAIDS as needed. She states prior to this she was only using the boot sparingly. Notes increased swelling after prolonged use.    Vitals:    07/25/22 1513   Pulse: 86   Resp: 18   SpO2: 98%   Weight: 86.6 kg (191 lb)   Height: 5' 3" (1.6 m)   PainSc:   6      Shoe Size:     Past Surgical History:   Procedure Laterality Date    COLONOSCOPY N/A 9/21/2021    Procedure: COLONOSCOPY;  Surgeon: Zach Hemphill MD;  Location: [facility] ENDO;  Service: Endoscopy;  Laterality: N/A;    CYSTOSCOPY N/A 10/28/2021    Procedure: CYSTOSCOPY;  Surgeon: Merlin Milligan MD;  Location: [facility] OR;  Service: OB/GYN;  Laterality: N/A;    FOOT SURGERY      ROBOT-ASSISTED LAPAROSCOPIC HYSTERECTOMY N/A 10/28/2021    Procedure: ROBOTIC HYSTERECTOMY;  Surgeon: Merlin Milligan MD;  Location: [facility] OR;  Service: OB/GYN;  Laterality: N/A;    ROBOT-ASSISTED LAPAROSCOPIC SALPINGO-OOPHORECTOMY Bilateral 10/28/2021    Procedure: ROBOTIC SALPINGO-OOPHORECTOMY;  Surgeon: Merlin Milligan MD;  Location: [facility] OR;  Service: OB/GYN;  Laterality: Bilateral;    TUBAL LIGATION Bilateral      Past Medical History:   Diagnosis Date    Asthma     Foot pain     GERD (gastroesophageal reflux disease)     Ovarian mass     Pelvic mass     Pelvic pain     Plantar fasciitis      Family History   Problem Relation Age of Onset    Arthritis Mother     Heart disease Father     Hypertension Father     Kidney disease Father  "    Colon cancer Neg Hx     Breast cancer Neg Hx     Ovarian cancer Neg Hx         Social History:   Marital Status:   Alcohol History:  reports previous alcohol use of about 1.0 standard drink of alcohol per week.  Tobacco History:  reports that she has never smoked. She has never used smokeless tobacco.  Drug History:  reports no history of drug use.    Review of patient's allergies indicates:  No Known Allergies    Current Outpatient Medications   Medication Sig Dispense Refill    celecoxib (CELEBREX) 200 MG capsule Take 200 mg by mouth.      estradioL (VIVELLE-DOT) 0.1 mg/24 hr PTSW Place 1 patch onto the skin twice a week. 8 patch 6    methylPREDNISolone (MEDROL DOSEPACK) 4 mg tablet use as directed (Patient not taking: Reported on 7/22/2022) 1 each 0    naproxen (NAPROSYN) 500 MG tablet TAKE 1 TABLET(500 MG) BY MOUTH TWICE DAILY 30 tablet 1    omeprazole (PRILOSEC) 40 MG capsule Take 1 capsule (40 mg total) by mouth once daily. 30 capsule 2     No current facility-administered medications for this visit.       Review of Systems   Constitutional: Negative for chills, fatigue, fever and unexpected weight change.   HENT: Negative for hearing loss and trouble swallowing.    Eyes: Negative for photophobia and visual disturbance.   Respiratory: Negative for cough, shortness of breath and wheezing.    Cardiovascular: Negative for chest pain, palpitations and leg swelling.   Gastrointestinal: Negative for abdominal pain and nausea.   Genitourinary: Negative for dysuria and frequency.   Musculoskeletal: Positive for arthralgias and gait problem. Negative for back pain, joint swelling and myalgias.   Skin: Negative for rash and wound.   Neurological: Negative for tremors, seizures, weakness, numbness and headaches.   Hematological: Does not bruise/bleed easily.         Objective:        Physical Exam:   Foot Exam    General  General Appearance: appears stated age and healthy   Orientation: alert and  oriented to person, place, and time   Affect: appropriate   Gait: antalgic       Left Foot/Ankle      Inspection and Palpation  Ecchymosis: none  Tenderness: calcaneus tenderness (Distal Achilles tendon just proximal to calcaneal insertion)  Swelling: (Achilles insertion)  Arch: pes planus  Hammertoes: absent  Claw toes: absent  Skin Exam: skin intact;   Neurovascular  Dorsalis pedis: 2+  Posterior tibial: 2+  Capillary refill: 2+  Saphenous nerve sensation: normal  Tibial nerve sensation: normal  Superficial peroneal nerve sensation: normal  Deep peroneal nerve sensation: normal  Sural nerve sensation: normal    Edema  Type of edema: non-pitting    Muscle Strength  Ankle dorsiflexion: 5  Ankle plantar flexion: 5  Ankle inversion: 5  Ankle eversion: 5  Great toe extension: 5  Great toe flexion: 5    Range of Motion    Passive  Ankle dorsiflexion: 5, pain    Active  Plantar flexion: pain    Tests  Anterior drawer: negative   Calcaneal squeeze: negative   Talar tilt: negative   PT Tinel's sign: negative  Paresthesia: negative  Comments  There is an area of palpable thickening in the distal Achilles tendon.  No palpable void is noted.    Physical Exam  Cardiovascular:      Pulses:           Dorsalis pedis pulses are 2+ on the left side.        Posterior tibial pulses are 2+ on the left side.               Left Ankle/Foot Exam     Comments:  There is an area of palpable thickening in the distal Achilles tendon.  No palpable void is noted.      Muscle Strength   Left Lower Extremity   Ankle Dorsiflexion:  5   Plantar flexion:  5/5     Vascular Exam       Left Pulses  Dorsalis Pedis:      2+  Posterior Tibial:      2+             Imaging: none           Assessment:       1. Achilles tendinosis of left lower extremity    2. Pain of joint of left ankle and foot    3. Chronic pain of left ankle    4. Right foot pain      Plan:   Achilles tendinosis of left lower extremity    Pain of joint of left ankle and foot    Chronic  pain of left ankle    Right foot pain      Follow up if symptoms worsen or fail to improve.    Procedures        CAM walker boot with weight bearing with heel lift  Ice to painful area, 15 minutes at a time  Ace-wrap to help with swelling  No barefoot   Keep affected extremity elevated while seated    I discussed with the patient that she needs to be wearing the CAM boot full time in order for it to be effective. I discussed with her that if her symptoms are not improving over the next 4 weeks then we will consider a new MRI as her last one was approximately 1 year ago.     Counseling:     I provided patient education verbally regarding:   Patient diagnosis, treatment options, as well as alternatives, risks, and benefits.     Discussed treatment of Achilles tendonitis with rest, ice, oral NSAID, topical antiinflammatory creams, heel lifts, no barefoot, and cam walker boot if needed. Explained the importance of proper stretching. Discussed possibility of MRI for further evaluation if symptoms do not improve. \      This note was created using Dragon voice recognition software that occasionally misinterpreted phrases or words.

## 2022-07-26 LAB — VIT B12 SERPL-MCNC: 369 NG/L (ref 180–914)

## 2022-07-28 LAB — METHYLMALONATE SERPL-SCNC: 0.16 NMOL/ML

## 2022-08-18 RX ORDER — ESTRADIOL 0.1 MG/D
1 FILM, EXTENDED RELEASE TRANSDERMAL
Qty: 8 PATCH | Refills: 4 | Status: SHIPPED | OUTPATIENT
Start: 2022-08-18 | End: 2022-09-07 | Stop reason: CLARIF

## 2022-08-30 NOTE — ED NOTES
"C/o left foot pain. "pulling pain". Denies trauma. Pedal pulse palpable.  " [FreeTextEntry1] : 58 y/o postmenopausal female with locally advanced poorly differentiated left breast IDC,  ER>90%, LA>90%, HER2 negative.  Breast MRI with multicentric disease in left breast, largest abnormal enhancement measures 6.7 cm and extends towards 2.4 cm retroareolar mass.  Completed NACT with DD AC followed by Taxol on 2/8/19.\par S/p bilateral mastectomy with SUNNI reconstruction on 3/27/19. She had residual ypT1c ypN1 disease (2/7 LN positive with extracapsular extension + LVI).  There were no clinically abnormal nodes on presurgery MRIs or exam. Completed PMRT given residual kevin disease post NACT in  August 2019.   Received Letrozole ~ 4/26/19 - 12/2020, drug holiday due to severe joint pain 12/2020- 5/20/2021, though holiday was only intended for 4-6 weeks. \par \par Started Anastrazole 5/20/21, tolerating that much better than Letrozole. Pt is now experiencing increased sweating and RUQ pain. Unfortunately, she lost her insurance and did not get blood work done today due to cost.  \par \par Plan:\par -new RUQ pain - CT abd/pelvis ordered, also advised to get CT chest done as previously ordered. \par -Continue Anastrazole\par -CIPN/ Anxiety/depression- Duloxetine to 90mg daily \par -Constant sweating- increase Gabapentin from 300 mg qd to 400 mg BID  \par -Osteopenia- defers Prolia/Zometa. Continue calcium and vitamin D. Vitamin D level ordered.\par -Lung nodules- surveillance CT Chest prev. ordered, 4/23/22, pt has not yet completed\par -referred to to see if she can receive sliding scale coverage due to being insured. \par -tentative RTO 12/2022. \par

## 2022-09-07 ENCOUNTER — HOSPITAL ENCOUNTER (EMERGENCY)
Facility: HOSPITAL | Age: 52
Discharge: HOME | End: 2022-09-07
Attending: EMERGENCY MEDICINE
Payer: COMMERCIAL

## 2022-09-07 ENCOUNTER — PATIENT MESSAGE (OUTPATIENT)
Dept: PODIATRY | Facility: CLINIC | Age: 52
End: 2022-09-07
Payer: COMMERCIAL

## 2022-09-07 VITALS
HEART RATE: 80 BPM | DIASTOLIC BLOOD PRESSURE: 64 MMHG | TEMPERATURE: 98 F | WEIGHT: 194.88 LBS | HEIGHT: 63 IN | RESPIRATION RATE: 18 BRPM | SYSTOLIC BLOOD PRESSURE: 138 MMHG | OXYGEN SATURATION: 100 % | BODY MASS INDEX: 34.53 KG/M2

## 2022-09-07 DIAGNOSIS — M77.52 RETROCALCANEAL BURSITIS (BACK OF HEEL), LEFT: Primary | ICD-10-CM

## 2022-09-07 PROCEDURE — 63600175 PHARM REV CODE 636 W HCPCS: Performed by: EMERGENCY MEDICINE

## 2022-09-07 PROCEDURE — 99283 EMERGENCY DEPT VISIT LOW MDM: CPT

## 2022-09-07 PROCEDURE — 25000003 PHARM REV CODE 250: Performed by: EMERGENCY MEDICINE

## 2022-09-07 RX ORDER — TRIAMCINOLONE ACETONIDE 40 MG/ML
40 INJECTION, SUSPENSION INTRA-ARTICULAR; INTRAMUSCULAR
Status: COMPLETED | OUTPATIENT
Start: 2022-09-07 | End: 2022-09-07

## 2022-09-07 RX ORDER — DICLOFENAC SODIUM 50 MG/1
50 TABLET, DELAYED RELEASE ORAL 3 TIMES DAILY
Qty: 15 TABLET | Refills: 0 | Status: SHIPPED | OUTPATIENT
Start: 2022-09-07 | End: 2023-02-28

## 2022-09-07 RX ORDER — BUPIVACAINE HYDROCHLORIDE 5 MG/ML
10 INJECTION, SOLUTION EPIDURAL; INTRACAUDAL
Status: COMPLETED | OUTPATIENT
Start: 2022-09-07 | End: 2022-09-07

## 2022-09-07 RX ADMIN — TRIAMCINOLONE ACETONIDE 40 MG: 40 INJECTION, SUSPENSION INTRA-ARTICULAR; INTRAMUSCULAR at 01:09

## 2022-09-07 RX ADMIN — BUPIVACAINE HYDROCHLORIDE 50 MG: 5 INJECTION, SOLUTION EPIDURAL; INTRACAUDAL; PERINEURAL at 01:09

## 2022-09-07 NOTE — ED PROVIDER NOTES
"Encounter Date: 9/7/2022    SCRIBE #1 NOTE: I, Amy Rhodes, am scribing for, and in the presence of,  Giovanny Cartagena MD.     History     Chief Complaint   Patient presents with    Foot Pain     Pain to left foot. She reports history of plantar fasciitis.       Time seen by provider: 1:17 PM on 09/07/2022    Joselin Phillips is a 51 y.o. female with a PMHx of previous left foot surgery, left sided plantar fasciitis and left sided Achilles tendinitis followed by Dr. Lauren with podiatry presents to the ED with left foot and ankle pain that's been going on "for years now". The pain is persistent with fluctuating levels of severity. She describes the pain as a "pulling" sensation. Patient last saw Dr. Lauren on 7/15/22 for this. She was given a CAM walker boot to wear and NSAIDs for symptomatic relief. Due to no improvement, patient stopped wearing the boot 3-4 weeks ago. Patient was instructed to follow up with podiatry for an MRI if her Sx did not improve within 4 weeks.    The history is provided by the patient.   Review of patient's allergies indicates:  No Known Allergies  Past Medical History:   Diagnosis Date    Asthma     Foot pain     GERD (gastroesophageal reflux disease)     Ovarian mass     Pelvic mass     Pelvic pain     Plantar fasciitis      Past Surgical History:   Procedure Laterality Date    COLONOSCOPY N/A 9/21/2021    Procedure: COLONOSCOPY;  Surgeon: Zach Hemphill MD;  Location: [facility];  Service: Endoscopy;  Laterality: N/A;    CYSTOSCOPY N/A 10/28/2021    Procedure: CYSTOSCOPY;  Surgeon: Merlin Milligan MD;  Location: [facility] OR;  Service: OB/GYN;  Laterality: N/A;    FOOT SURGERY      ROBOT-ASSISTED LAPAROSCOPIC HYSTERECTOMY N/A 10/28/2021    Procedure: ROBOTIC HYSTERECTOMY;  Surgeon: Merlin Milligan MD;  Location: [facility] OR;  Service: OB/GYN;  Laterality: N/A;    ROBOT-ASSISTED LAPAROSCOPIC SALPINGO-OOPHORECTOMY Bilateral 10/28/2021    Procedure: ROBOTIC SALPINGO-OOPHORECTOMY;  " Surgeon: Merlin Milligan MD;  Location: [facility];  Service: OB/GYN;  Laterality: Bilateral;    TUBAL LIGATION Bilateral      Family History   Problem Relation Age of Onset    Arthritis Mother     Heart disease Father     Hypertension Father     Kidney disease Father     Colon cancer Neg Hx     Breast cancer Neg Hx     Ovarian cancer Neg Hx      Social History     Tobacco Use    Smoking status: Never    Smokeless tobacco: Never   Substance Use Topics    Alcohol use: Not Currently     Alcohol/week: 1.0 standard drink     Types: 1 Glasses of wine per week     Comment: social    Drug use: Never     Review of Systems   Constitutional:  Negative for fever.   Respiratory:  Negative for shortness of breath.    Genitourinary:  Negative for flank pain.   Musculoskeletal:  Positive for arthralgias (left ankle) and joint swelling (left ankle).        Positive for left foot pain.   Neurological:  Negative for weakness.   Psychiatric/Behavioral:  Negative for confusion.    All other systems reviewed and are negative.    Physical Exam     Initial Vitals [09/07/22 1305]   BP Pulse Resp Temp SpO2   138/64 88 18 97.9 °F (36.6 °C) 100 %      MAP       --         Physical Exam    Nursing note and vitals reviewed.  Constitutional: She appears well-developed and well-nourished.   HENT:   Head: Normocephalic and atraumatic.   Eyes: Conjunctivae are normal.   Neck: Neck supple.   Normal range of motion.  Cardiovascular:  Normal rate, regular rhythm and normal heart sounds.     Exam reveals no gallop and no friction rub.       No murmur heard.  Pulses:       Dorsalis pedis pulses are 2+ on the left side.        Posterior tibial pulses are 2+ on the left side.   Pulmonary/Chest: Effort normal and breath sounds normal. No respiratory distress. She has no wheezes. She has no rhonchi. She has no rales.   Musculoskeletal:         General: Normal range of motion.      Cervical back: Normal range of motion and neck supple.      Comments: Left  foot: Joint effusion with tenderness to the Achilles tendon and left posterolateral ankle mortise.     Neurological: She is alert and oriented to person, place, and time.   Skin: Skin is warm and dry. No erythema.   Psychiatric: She has a normal mood and affect.       ED Course   Procedures  Labs Reviewed - No data to display       Imaging Results    None          Medications   triamcinolone acetonide injection 40 mg (40 mg INTRABURSAL Given by Provider 9/7/22 1343)   BUPivacaine (PF) 0.5% (5 mg/mL) injection 50 mg (50 mg Subcutaneous Given by Provider 9/7/22 1330)     Medical Decision Making:   History:   Old Medical Records: I decided to obtain old medical records.  ED Management:  51-year-old female presents with persistent right posterior ankle pain.  She has swelling in the region of the retrocalcaneal bursa.  On exam in of her shoe she does have a rubbing point with shoes and most likely precipitated the bursitis.  The bursa is injected by me with 40 mg of triamcinolone and 3 cc of bupivacaine 0.5%.  Septic arthritis and gout or unlikely.     APC / Resident Notes:   I, Dr. Giovanny Cartagena III, personally performed the services described in this documentation. All medical record entries made by the scribe were at my direction and in my presence.  I have reviewed the chart and agree that the record reflects my personal performance and is accurate and complete   Scribe Attestation:   Scribe #1: I performed the above scribed service and the documentation accurately describes the services I performed. I attest to the accuracy of the note.               Clinical Impression:   Final diagnoses:  [M77.52] Retrocalcaneal bursitis (back of heel), left (Primary)      ED Disposition Condition    Discharge Stable          ED Prescriptions       Medication Sig Dispense Start Date End Date Auth. Provider    diclofenac (VOLTAREN) 50 MG EC tablet Take 1 tablet (50 mg total) by mouth 3 (three) times daily. 15 tablet 9/7/2022  9/7/2023 Giovanny Cartagena III, MD          Follow-up Information       Follow up With Specialties Details Why Contact Info    Alexey Lauren DPM Podiatry, Surgery, Wound Care In 3 days  [address] [facility]  [address] [place] [id number]  [phone]               Giovanny Cartagena III, MD  09/07/22 9512

## 2022-09-07 NOTE — Clinical Note
"Joselin Phillips (Jackie) was seen and treated in our emergency department on 9/7/2022.  She may return to work on 09/12/2022.       If you have any questions or concerns, please don't hesitate to call.      FILOMENA Trevino RN    "

## 2022-12-05 ENCOUNTER — OFFICE VISIT (OUTPATIENT)
Dept: PODIATRY | Facility: CLINIC | Age: 52
End: 2022-12-05
Payer: COMMERCIAL

## 2022-12-05 VITALS — OXYGEN SATURATION: 98 % | BODY MASS INDEX: 34.02 KG/M2 | WEIGHT: 192 LBS | HEART RATE: 107 BPM | HEIGHT: 63 IN

## 2022-12-05 DIAGNOSIS — M67.88 ACHILLES TENDINOSIS OF LEFT LOWER EXTREMITY: Primary | ICD-10-CM

## 2022-12-05 DIAGNOSIS — M25.572 PAIN OF JOINT OF LEFT ANKLE AND FOOT: ICD-10-CM

## 2022-12-05 DIAGNOSIS — G89.29 CHRONIC PAIN OF LEFT ANKLE: ICD-10-CM

## 2022-12-05 DIAGNOSIS — M25.572 CHRONIC PAIN OF LEFT ANKLE: ICD-10-CM

## 2022-12-05 PROCEDURE — 1159F PR MEDICATION LIST DOCUMENTED IN MEDICAL RECORD: ICD-10-PCS | Mod: CPTII,S$GLB,, | Performed by: PODIATRIST

## 2022-12-05 PROCEDURE — 3008F PR BODY MASS INDEX (BMI) DOCUMENTED: ICD-10-PCS | Mod: CPTII,S$GLB,, | Performed by: PODIATRIST

## 2022-12-05 PROCEDURE — 1159F MED LIST DOCD IN RCRD: CPT | Mod: CPTII,S$GLB,, | Performed by: PODIATRIST

## 2022-12-05 PROCEDURE — 99213 OFFICE O/P EST LOW 20 MIN: CPT | Mod: S$GLB,,, | Performed by: PODIATRIST

## 2022-12-05 PROCEDURE — 3044F HG A1C LEVEL LT 7.0%: CPT | Mod: CPTII,S$GLB,, | Performed by: PODIATRIST

## 2022-12-05 PROCEDURE — 1160F RVW MEDS BY RX/DR IN RCRD: CPT | Mod: CPTII,S$GLB,, | Performed by: PODIATRIST

## 2022-12-05 PROCEDURE — 99213 PR OFFICE/OUTPT VISIT, EST, LEVL III, 20-29 MIN: ICD-10-PCS | Mod: S$GLB,,, | Performed by: PODIATRIST

## 2022-12-05 PROCEDURE — 3044F PR MOST RECENT HEMOGLOBIN A1C LEVEL <7.0%: ICD-10-PCS | Mod: CPTII,S$GLB,, | Performed by: PODIATRIST

## 2022-12-05 PROCEDURE — 3008F BODY MASS INDEX DOCD: CPT | Mod: CPTII,S$GLB,, | Performed by: PODIATRIST

## 2022-12-05 PROCEDURE — 1160F PR REVIEW ALL MEDS BY PRESCRIBER/CLIN PHARMACIST DOCUMENTED: ICD-10-PCS | Mod: CPTII,S$GLB,, | Performed by: PODIATRIST

## 2022-12-05 RX ORDER — NAPROXEN 250 MG/1
250 TABLET ORAL 2 TIMES DAILY
COMMUNITY
End: 2024-02-15

## 2022-12-05 NOTE — PROGRESS NOTES
"  [address]  [place] [address]  Phone: [phone]   [phone]    Patient's PCP:Orlando Mohan MD  Referring Provider: No ref. provider found    Subjective:      Chief Complaint:: Follow-up (Achilles tendinosis of left lower extremity)    SATISH Phillips is a 51 y.o. female who presents today with a follow up complaint of left heel pain.  She states no real change in her symptoms.  She has continued pain in the back of her heel.      Vitals:    12/05/22 1610   Pulse: 107   SpO2: 98%   Weight: 87.1 kg (192 lb)   Height: 5' 3" (1.6 m)   PainSc:   7      Shoe Size: 9    Past Surgical History:   Procedure Laterality Date    COLONOSCOPY N/A 9/21/2021    Procedure: COLONOSCOPY;  Surgeon: Zach Hemphill MD;  Location: [facility];  Service: Endoscopy;  Laterality: N/A;    CYSTOSCOPY N/A 10/28/2021    Procedure: CYSTOSCOPY;  Surgeon: Merlin Milligan MD;  Location: [facility];  Service: OB/GYN;  Laterality: N/A;    FOOT SURGERY      ROBOT-ASSISTED LAPAROSCOPIC HYSTERECTOMY N/A 10/28/2021    Procedure: ROBOTIC HYSTERECTOMY;  Surgeon: Merlin Mililgan MD;  Location: [facility];  Service: OB/GYN;  Laterality: N/A;    ROBOT-ASSISTED LAPAROSCOPIC SALPINGO-OOPHORECTOMY Bilateral 10/28/2021    Procedure: ROBOTIC SALPINGO-OOPHORECTOMY;  Surgeon: Merlin Milligan MD;  Location: [facility];  Service: OB/GYN;  Laterality: Bilateral;    TUBAL LIGATION Bilateral      Past Medical History:   Diagnosis Date    Asthma     Foot pain     GERD (gastroesophageal reflux disease)     Ovarian mass     Pelvic mass     Pelvic pain     Plantar fasciitis      Family History   Problem Relation Age of Onset    Arthritis Mother     Heart disease Father     Hypertension Father     Kidney disease Father     Colon cancer Neg Hx     Breast cancer Neg Hx     Ovarian cancer Neg Hx         Social History:   Marital Status:   Alcohol History:  reports that she does not currently use alcohol after a past usage of " about 1.0 standard drink per week.  Tobacco History:  reports that she has never smoked. She has never used smokeless tobacco.  Drug History:  reports no history of drug use.    Review of patient's allergies indicates:  No Known Allergies    Current Outpatient Medications   Medication Sig Dispense Refill    diclofenac (VOLTAREN) 50 MG EC tablet Take 1 tablet (50 mg total) by mouth 3 (three) times daily. 15 tablet 0    naproxen (NAPROSYN) 250 MG tablet Take 250 mg by mouth 2 (two) times daily.      omeprazole (PRILOSEC) 40 MG capsule Take 1 capsule (40 mg total) by mouth once daily. 30 capsule 2     No current facility-administered medications for this visit.       Review of Systems   Constitutional:  Negative for chills, fatigue, fever and unexpected weight change.   HENT:  Negative for hearing loss and trouble swallowing.    Eyes:  Negative for photophobia and visual disturbance.   Respiratory:  Negative for cough, shortness of breath and wheezing.    Cardiovascular:  Negative for chest pain, palpitations and leg swelling.   Gastrointestinal:  Negative for abdominal pain and nausea.   Genitourinary:  Negative for dysuria and frequency.   Musculoskeletal:  Positive for arthralgias and gait problem. Negative for back pain, joint swelling and myalgias.   Skin:  Negative for rash and wound.   Neurological:  Negative for tremors, seizures, weakness, numbness and headaches.   Hematological:  Does not bruise/bleed easily.       Objective:        Physical Exam:   Foot Exam    General  General Appearance: appears stated age and healthy   Orientation: alert and oriented to person, place, and time   Affect: appropriate   Gait: antalgic       Left Foot/Ankle      Inspection and Palpation  Ecchymosis: none  Tenderness: calcaneus tenderness (Distal Achilles tendon just proximal to calcaneal insertion)  Swelling: (achilles insertion)  Arch: pes planus  Hammertoes: absent  Claw toes: absent  Skin Exam: skin intact;    Neurovascular  Dorsalis pedis: 2+  Posterior tibial: 2+  Capillary refill: 2+  Saphenous nerve sensation: normal  Tibial nerve sensation: normal  Superficial peroneal nerve sensation: normal  Deep peroneal nerve sensation: normal  Sural nerve sensation: normal    Edema  Type of edema: non-pitting    Muscle Strength  Ankle dorsiflexion: 5  Ankle plantar flexion: 5  Ankle inversion: 5  Ankle eversion: 5  Great toe extension: 5  Great toe flexion: 5    Range of Motion    Passive  Ankle dorsiflexion: 5, pain    Active  Plantar flexion: pain    Tests  Anterior drawer: negative   Calcaneal squeeze: negative   Talar tilt: negative   PT Tinel's sign: negative  Paresthesia: negative  Comments  There is an area of palpable thickening in the distal Achilles tendon.  No palpable void is noted.  Physical Exam  Cardiovascular:      Pulses:           Dorsalis pedis pulses are 2+ on the left side.        Posterior tibial pulses are 2+ on the left side.             Left Ankle/Foot Exam     Comments:  There is an area of palpable thickening in the distal Achilles tendon.  No palpable void is noted.      Muscle Strength   Left Lower Extremity   Ankle Dorsiflexion:  5   Plantar flexion:  5/5     Vascular Exam       Left Pulses  Dorsalis Pedis:      2+  Posterior Tibial:      2+         Imaging: none            Assessment:       1. Achilles tendinosis of left lower extremity    2. Pain of joint of left ankle and foot    3. Chronic pain of left ankle      Plan:   Achilles tendinosis of left lower extremity  -     MRI Ankle Without Contrast Left; Future; Expected date: 12/05/2022    Pain of joint of left ankle and foot  -     MRI Ankle Without Contrast Left; Future; Expected date: 12/05/2022    Chronic pain of left ankle  -     MRI Ankle Without Contrast Left; Future; Expected date: 12/05/2022    Follow up Pending MRI results to discuss surgery.    Procedures        I discussed with the patient that given the chronic nature of her issue  as well as no change in her symptoms despite multiple conservative treatments I think surgical intervention will likely be necessary.  As it has been almost a year and a half since her last MRI I am going to order an updated MRI for surgical planning.  Patient states that she will likely consider surgical intervention at the started the new year.  I am going to give her a new long leg Cam Walker boot for weight-bearing.    CAM walker boot with weight bearing  Ice to painful area, 15 minutes at a time  Ace-wrap to help with swelling  No barefoot   Keep affected extremity elevated while seated        Counseling:     I provided patient education verbally regarding:   Patient diagnosis, treatment options, as well as alternatives, risks, and benefits.     Discussed treatment of Achilles tendonitis with rest, ice, oral NSAID, topical antiinflammatory creams, heel lifts, no barefoot, and cam walker boot if needed. Explained the importance of proper stretching. Discussed possibility of MRI for further evaluation if symptoms do not improve.       This note was created using Dragon voice recognition software that occasionally misinterpreted phrases or words.

## 2022-12-05 NOTE — PATIENT INSTRUCTIONS
Achilles Tendinitis    Achilles tendinitis is a common condition that occurs when the large tendon that runs down the back of your lower leg becomes irritated and inflamed.  The Achilles tendon is the largest tendon in the body. It connects your calf muscles to your heel bone and is used when you walk, run, climb stairs, jump, and stand on your tip toes. Although the Achilles tendon can withstand great stresses from running and jumping, it is also prone to tendinitis, a condition associated with overuse and degeneration.       Achilles tendinitis pain can occur within the tendon itself or at the point where it attaches to the heel bone, called the Achilles tendon insertion.    Description  Simply defined, tendinitis is inflammation of a tendon. Inflammation is the body's natural response to injury or disease, and often causes swelling, pain, or irritation. There are two types of Achilles tendinitis, based upon which part of the tendon is inflamed.    Noninsertional Achilles tendinitis    Noninsertional Achilles Tendinitis  In noninsertional Achilles tendinitis, fibers in the middle portion of the tendon have begun to break down with tiny tears (degenerate), swell, and thicken.  Tendinitis of the middle portion of the tendon more commonly affects younger, active people.    Insertional Achilles Tendinitis    Insertional Achilles tendinitis    Insertional Achilles tendinitis involves the lower portion of the heel, where the tendon attaches (inserts) to the heel bone.    In both noninsertional and insertional Achilles tendinitis, damaged tendon fibers may also calcify (harden). Bone spurs (extra bone growth) often form with insertional Achilles tendinitis.    Tendinitis that affects the insertion of the tendon can occur at any time, even in patients who are not active. More often than not, however, it comes from years of overuse (long distance runners, sprinters).    Cause  Achilles tendinitis is typically not related  to a specific injury. The problem results from repetitive stress to the tendon. This often happens when we push our bodies to do too much, too soon, but other factors can make it more likely to develop tendinitis, including:  Sudden increase in the amount or intensity of exercise activity--for example, increasing the distance you run every day by a few miles without giving your body a chance to adjust to the new distance  Tight calf muscles--Having tight calf muscles and suddenly starting an aggressive exercise program can put extra stress on the Achilles tendon  Bone spur--Extra bone growth where the Achilles tendon attaches to the heel bone can rub against the tendon and cause pain           Understanding Plantar Fasciitis    Plantar fasciitis is a condition that causes foot and heel pain. The plantar fascia is a tough band of tissue that runs across the bottom of the foot from the heel to the toes. This tissue pulls on the heel bone. It supports the arch of the foot as it pushes off the ground. If the tissue becomes irritated or red and swollen (inflamed), it is called plantar fasciitis.  How to say it  PLAN-tuhr [id number]     What causes plantar fasciitis?  Plantar fasciitis most often occurs from overusing the plantar fascia. The tissue may become damaged from activities that put repeated stress on the heel and foot. Or it may wear down over time with age and ankle stiffness. You are more likely to have plantar fasciitis if you:  Do activities that require a lot of running, jumping, or dancing  Have a job that requires being on your feet for long periods  Are overweight or obese  Have certain foot problems, such as a tight Achilles tendon, flat feet, or high arches  Often wear poorly fitting shoes    Symptoms of plantar fasciitis  The condition most often causes pain in the heel and the bottom of the foot. The pain may occur when you take your first steps in the morning. It may get better as you walk  throughout the day. But as you continue to put weight on the foot, the pain often returns. Pain may also occur after standing or sitting for long periods.    Treating plantar fasciitis  Treatments for plantar fasciitis include:  Resting the foot. This involves limiting movements that make your foot hurt. You may also need to avoid certain sports and types of work for a time.  Using cold packs. Put an ice pack on the heel and foot to help reduce pain and swelling.  Taking pain medicines. Prescription and over-the-counter pain medicines can help relieve pain and swelling.  Using heel cups or foot inserts (orthotics). These are placed in the shoes to help support the heel or arch and cushion the heel. You may also be told to buy proper-fitting shoes with good arch support and cushioned soles.  Taping the foot. This supports the arch and limits the movement of the plantar fascia to help relieve symptoms.  Wearing a night splint. This stretches the plantar fascia and leg muscles while you sleep. This may help relieve pain.  Doing exercises and physical therapy. These stretch and strengthen the plantar fascia and the muscles in the leg that support the heel and foot.  Getting shots of medicine into the foot. These may help relieve symptoms for a time.  Having surgery. This may be needed if other treatments fail to relieve symptoms. During surgery, the surgeon may partially cut the plantar fascia to release tension.    Possible complications of plantar fasciitis  Without proper care and treatment, healing may take longer than normal. Also, symptoms may continue or get worse. Over time, the plantar fascia may be damaged. This can make it hard to walk or even stand without pain.    When to call your healthcare provider  Call your healthcare provider right away if you have any of these:  Fever of 100.4°F (38°C) or higher, or as directed  Symptoms that dont get better with treatment, or get worse  New symptoms, such as  numbness, tingling, or weakness in the foot     Date Last Reviewed: 3/10/2016  © 8955-7968 [employer]. [address] [place] [address], [place] [address]. All rights reserved. This information is not intended as a substitute for professional medical care. Always follow your healthcare professional's instructions.

## 2022-12-06 ENCOUNTER — PATIENT MESSAGE (OUTPATIENT)
Dept: ADMINISTRATIVE | Facility: HOSPITAL | Age: 52
End: 2022-12-06
Payer: COMMERCIAL

## 2022-12-06 ENCOUNTER — PATIENT OUTREACH (OUTPATIENT)
Dept: ADMINISTRATIVE | Facility: HOSPITAL | Age: 52
End: 2022-12-06
Payer: COMMERCIAL

## 2022-12-06 ENCOUNTER — PATIENT MESSAGE (OUTPATIENT)
Dept: PODIATRY | Facility: CLINIC | Age: 52
End: 2022-12-06
Payer: COMMERCIAL

## 2022-12-06 NOTE — PROGRESS NOTES
BREAST CANCER SCREENING    Outreach to patient in reference to SCHEDULING A MAMMOGRAM EXAM.     Chart review completed:   Care Everywhere and Media reports - updates requested and reviewed.      LEFT MESSAGE TO RETURN CALL  REVIEWED DIS 12/06/2022

## 2022-12-12 ENCOUNTER — PATIENT MESSAGE (OUTPATIENT)
Dept: OBSTETRICS AND GYNECOLOGY | Facility: CLINIC | Age: 52
End: 2022-12-12
Payer: COMMERCIAL

## 2023-01-10 ENCOUNTER — PATIENT OUTREACH (OUTPATIENT)
Dept: ADMINISTRATIVE | Facility: HOSPITAL | Age: 53
End: 2023-01-10
Payer: COMMERCIAL

## 2023-01-10 NOTE — PROGRESS NOTES
Chart review completed. Care Everywhere updated and reviewed.Media,  [facility] and [facility] reviewed. No new HM items found.    Immunizations reconciled and reviewed.     Orders reviewed for  orders.

## 2023-01-30 ENCOUNTER — PATIENT MESSAGE (OUTPATIENT)
Dept: PODIATRY | Facility: CLINIC | Age: 53
End: 2023-01-30
Payer: COMMERCIAL

## 2023-01-31 ENCOUNTER — HOSPITAL ENCOUNTER (OUTPATIENT)
Dept: RADIOLOGY | Facility: HOSPITAL | Age: 53
Discharge: HOME | End: 2023-01-31
Attending: PODIATRIST
Payer: COMMERCIAL

## 2023-01-31 DIAGNOSIS — G89.29 CHRONIC PAIN OF LEFT ANKLE: ICD-10-CM

## 2023-01-31 DIAGNOSIS — M25.572 PAIN OF JOINT OF LEFT ANKLE AND FOOT: ICD-10-CM

## 2023-01-31 DIAGNOSIS — M25.572 CHRONIC PAIN OF LEFT ANKLE: ICD-10-CM

## 2023-01-31 DIAGNOSIS — M67.88 ACHILLES TENDINOSIS OF LEFT LOWER EXTREMITY: ICD-10-CM

## 2023-01-31 PROCEDURE — 73721 MRI JNT OF LWR EXTRE W/O DYE: CPT | Mod: TC,PO,LT

## 2023-02-08 ENCOUNTER — OFFICE VISIT (OUTPATIENT)
Dept: PODIATRY | Facility: CLINIC | Age: 53
End: 2023-02-08
Payer: COMMERCIAL

## 2023-02-08 VITALS — WEIGHT: 192 LBS | BODY MASS INDEX: 34.02 KG/M2 | RESPIRATION RATE: 16 BRPM | HEIGHT: 63 IN

## 2023-02-08 DIAGNOSIS — M77.32 EXOSTOSIS OF LEFT POSTERIOR CALCANEUS: ICD-10-CM

## 2023-02-08 DIAGNOSIS — M24.572 EQUINUS CONTRACTURE OF LEFT ANKLE: ICD-10-CM

## 2023-02-08 DIAGNOSIS — G89.29 CHRONIC PAIN OF LEFT ANKLE: ICD-10-CM

## 2023-02-08 DIAGNOSIS — M67.88 ACHILLES TENDINOSIS OF LEFT LOWER EXTREMITY: Primary | ICD-10-CM

## 2023-02-08 DIAGNOSIS — M25.572 CHRONIC PAIN OF LEFT ANKLE: ICD-10-CM

## 2023-02-08 DIAGNOSIS — M25.572 PAIN OF JOINT OF LEFT ANKLE AND FOOT: ICD-10-CM

## 2023-02-08 PROCEDURE — 1160F RVW MEDS BY RX/DR IN RCRD: CPT | Mod: CPTII,S$GLB,, | Performed by: PODIATRIST

## 2023-02-08 PROCEDURE — 3008F BODY MASS INDEX DOCD: CPT | Mod: CPTII,S$GLB,, | Performed by: PODIATRIST

## 2023-02-08 PROCEDURE — 99214 OFFICE O/P EST MOD 30 MIN: CPT | Mod: S$GLB,,, | Performed by: PODIATRIST

## 2023-02-08 PROCEDURE — 3008F PR BODY MASS INDEX (BMI) DOCUMENTED: ICD-10-PCS | Mod: CPTII,S$GLB,, | Performed by: PODIATRIST

## 2023-02-08 PROCEDURE — 99214 PR OFFICE/OUTPT VISIT, EST, LEVL IV, 30-39 MIN: ICD-10-PCS | Mod: S$GLB,,, | Performed by: PODIATRIST

## 2023-02-08 PROCEDURE — 1160F PR REVIEW ALL MEDS BY PRESCRIBER/CLIN PHARMACIST DOCUMENTED: ICD-10-PCS | Mod: CPTII,S$GLB,, | Performed by: PODIATRIST

## 2023-02-08 PROCEDURE — 1159F PR MEDICATION LIST DOCUMENTED IN MEDICAL RECORD: ICD-10-PCS | Mod: CPTII,S$GLB,, | Performed by: PODIATRIST

## 2023-02-08 PROCEDURE — 1159F MED LIST DOCD IN RCRD: CPT | Mod: CPTII,S$GLB,, | Performed by: PODIATRIST

## 2023-02-08 NOTE — H&P (VIEW-ONLY)
"  [address] [facility] [address] [facility] [address]  [place] [address]  Phone: [phone]   [phone]    Patient's PCP:Orlando Mohan MD  Referring Provider: No ref. provider found    Subjective:      Chief Complaint:: Results (MRI left)    HPI  Joselin Phillips is a 52 y.o. female who presents today to discuss results of left ankle MRI for complaint of achilles tendinitis. Notes continued aching to sharp throbbing burning and pulling. Using cam walker boot cast and taking NSAID as needed. States increased pain and swelling after prolonged use.    Vitals:    02/08/23 1059   Resp: 16   Weight: 87.1 kg (192 lb)   Height: 5' 3" (1.6 m)   PainSc:   7      Shoe Size:     Past Surgical History:   Procedure Laterality Date    COLONOSCOPY N/A 9/21/2021    Procedure: COLONOSCOPY;  Surgeon: Zach Hemphill MD;  Location: [facility];  Service: Endoscopy;  Laterality: N/A;    CYSTOSCOPY N/A 10/28/2021    Procedure: CYSTOSCOPY;  Surgeon: Merlin Milligan MD;  Location: [facility];  Service: OB/GYN;  Laterality: N/A;    FOOT SURGERY      ROBOT-ASSISTED LAPAROSCOPIC HYSTERECTOMY N/A 10/28/2021    Procedure: ROBOTIC HYSTERECTOMY;  Surgeon: Merlin Milligan MD;  Location: [facility];  Service: OB/GYN;  Laterality: N/A;    ROBOT-ASSISTED LAPAROSCOPIC SALPINGO-OOPHORECTOMY Bilateral 10/28/2021    Procedure: ROBOTIC SALPINGO-OOPHORECTOMY;  Surgeon: Merlin Milligan MD;  Location: [facility];  Service: OB/GYN;  Laterality: Bilateral;    TUBAL LIGATION Bilateral      Past Medical History:   Diagnosis Date    Asthma     Foot pain     GERD (gastroesophageal reflux disease)     Ovarian mass     Pelvic mass     Pelvic pain     Plantar fasciitis      Family History   Problem Relation Age of Onset    Arthritis Mother     Heart disease Father     Hypertension Father     Kidney disease Father     Colon cancer Neg Hx     Breast cancer Neg Hx     Ovarian cancer Neg Hx         Social History:   Marital Status:   Alcohol History:  reports that " she does not currently use alcohol after a past usage of about 1.0 standard drink per week.  Tobacco History:  reports that she has never smoked. She has never used smokeless tobacco.  Drug History:  reports no history of drug use.    Review of patient's allergies indicates:  No Known Allergies    Current Outpatient Medications   Medication Sig Dispense Refill    naproxen (NAPROSYN) 250 MG tablet Take 250 mg by mouth 2 (two) times daily.      diclofenac (VOLTAREN) 50 MG EC tablet Take 1 tablet (50 mg total) by mouth 3 (three) times daily. (Patient not taking: Reported on 2/8/2023) 15 tablet 0    omeprazole (PRILOSEC) 40 MG capsule Take 1 capsule (40 mg total) by mouth once daily. 30 capsule 2     No current facility-administered medications for this visit.       Review of Systems   Constitutional:  Negative for chills, fatigue, fever and unexpected weight change.   HENT:  Negative for hearing loss and trouble swallowing.    Eyes:  Negative for photophobia and visual disturbance.   Respiratory:  Negative for cough, shortness of breath and wheezing.    Cardiovascular:  Negative for chest pain, palpitations and leg swelling.   Gastrointestinal:  Negative for abdominal pain and nausea.   Genitourinary:  Negative for dysuria and frequency.   Musculoskeletal:  Positive for arthralgias and gait problem. Negative for back pain, joint swelling and myalgias.   Skin:  Negative for rash and wound.   Neurological:  Negative for tremors, seizures, weakness, numbness and headaches.   Hematological:  Does not bruise/bleed easily.       Objective:        Physical Exam:   Foot Exam    General  General Appearance: appears stated age and healthy   Orientation: alert and oriented to person, place, and time   Affect: appropriate   Gait: antalgic       Left Foot/Ankle      Inspection and Palpation  Ecchymosis: none  Tenderness: calcaneus tenderness (Distal Achilles tendon just proximal to calcaneal insertion)  Swelling: (achilles  insertion)  Arch: pes planus  Hammertoes: absent  Claw toes: absent  Skin Exam: skin intact;   Neurovascular  Dorsalis pedis: 2+  Posterior tibial: 2+  Capillary refill: 2+  Saphenous nerve sensation: normal  Tibial nerve sensation: normal  Superficial peroneal nerve sensation: normal  Deep peroneal nerve sensation: normal  Sural nerve sensation: normal    Edema  Type of edema: non-pitting    Muscle Strength  Ankle dorsiflexion: 5  Ankle plantar flexion: 5  Ankle inversion: 5  Ankle eversion: 5  Great toe extension: 5  Great toe flexion: 5    Range of Motion    Passive  Ankle dorsiflexion: 5, pain    Active  Plantar flexion: pain    Tests  Anterior drawer: negative   Calcaneal squeeze: negative   Talar tilt: negative   PT Tinel's sign: negative  Paresthesia: negative  Comments  There is an area of palpable thickening in the distal Achilles tendon.  No palpable void is noted.  Physical Exam  Cardiovascular:      Pulses:           Dorsalis pedis pulses are 2+ on the left side.        Posterior tibial pulses are 2+ on the left side.             Left Ankle/Foot Exam     Comments:  There is an area of palpable thickening in the distal Achilles tendon.  No palpable void is noted.      Muscle Strength   Left Lower Extremity   Ankle Dorsiflexion:  5   Plantar flexion:  5/5     Vascular Exam       Left Pulses  Dorsalis Pedis:      2+  Posterior Tibial:      2+         Imaging: MRI Ankle Without Contrast Left  EXAMINATION: MRI left ankle.    CLINICAL INFORMATION:  Pain and swelling    PROCEDURE:  Multiplanar multisequence MR images were obtained through the left ankle.    COMPARISON: MRI dated 8/5/2021    FINDINGS:  The anterior and posterior tibiofibular and talofibular, deltoid, and sinus tarsi ligaments are normal in signal intensity and morphology.    There is no evidence of edema in the region of the sinus tarsi.    There is mild tenosynovitis involving the tibialis posterior, flexor digitorum longus and proximal  peroneal tendons. The flexor hallicus longus, and extensor tendons are normal in signal intensity and morphology.    Again seen is a partial-thickness tear involving the distal Achilles tendon with retrocalcaneal bursitis.    IMPRESSION:  1. Partial-thickness tear along the distal Achilles tendon with surrounding retrocalcaneal bursitis is again noted.  2. Tenosynovitis as described above.    Electronically signed by:  Ana María Urias MD  1/31/2023 2:40 PM [facility] Workstation: [id number]               Assessment:       1. Achilles tendinosis of left lower extremity    2. Exostosis of left posterior calcaneus    3. Equinus contracture of left ankle    4. Pain of joint of left ankle and foot    5. Chronic pain of left ankle      Plan:   Achilles tendinosis of left lower extremity    Exostosis of left posterior calcaneus    Equinus contracture of left ankle    Pain of joint of left ankle and foot    Chronic pain of left ankle      Follow up Patient will be scheduled for outpatient surgery.    Procedures        MRI findings and images discussed in detail with the patient.  We discussed different conservative and surgical treatment options.  At this time patient wishes to proceed with surgical intervention.    Patient was educated about the entire pre, caitie and post-operative time period.  They  were educated about the pro's and con's of surgery.  All conservative measures have been exhausted. Patient understands the particular risks involved which may occur in connection with the procedure proposed including pain, swelling, infection, stiffness, decreased ROM, recurrence, rejection, numbness, delayed healing, scar formation.    Patient was educated that their diagnosis may be surgically treated.  The patient's problem will probably advance and usually will not get better without surgery.  All of the pre-operative treatment plans have been exhausted or will no longer be successful at this point in time.  Patient was  told of the possible outcomes and expectations of the surgical procedure.  They  will need to be followed-up post-operatively.  Today, pictures were drawn, questions answered.      We discussed the following surgical procedures:  1.  Repair of Achilles tendon left  2.  Resection of calcaneal exostosis left  3.  Possible gastrocnemius recession left       Counseling:     I provided patient education verbally regarding:   Patient diagnosis, treatment options, as well as alternatives, risks, and benefits.     Discussed treatment of Achilles tendonitis with rest, ice, oral NSAID, topical antiinflammatory creams, heel lifts, no barefoot, and cam walker boot if needed. Explained the importance of proper stretching. Discussed possibility of MRI for further evaluation if symptoms do not improve.     I discussed calcific achilles tendenosis and possible tear of the insertion.  I discussed conservative treatment with open back wedge shoes, heel lifts in running shoes, ROM with gentle stretching, possible long leg CAM walker boot cast with heel lifts, possible physical therapy.  If pain continues or if there is a concern of partial tear of the tendon thenan MRI will be ordered.        This note was created using Dragon voice recognition software that occasionally misinterpreted phrases or words.

## 2023-02-08 NOTE — PROGRESS NOTES
"  [address] [facility] [address]  [place] [address]  Phone: [phone]   [phone]    Patient's PCP:Orlando Mohan MD  Referring Provider: No ref. provider found    Subjective:      Chief Complaint:: Results (MRI left)    HPI  Joselin Phillips is a 52 y.o. female who presents today to discuss results of left ankle MRI for complaint of achilles tendinitis. Notes continued aching to sharp throbbing burning and pulling. Using cam walker boot cast and taking NSAID as needed. States increased pain and swelling after prolonged use.    Vitals:    02/08/23 1059   Resp: 16   Weight: 87.1 kg (192 lb)   Height: 5' 3" (1.6 m)   PainSc:   7      Shoe Size:     Past Surgical History:   Procedure Laterality Date    COLONOSCOPY N/A 9/21/2021    Procedure: COLONOSCOPY;  Surgeon: Zach Hemphill MD;  Location: [facility];  Service: Endoscopy;  Laterality: N/A;    CYSTOSCOPY N/A 10/28/2021    Procedure: CYSTOSCOPY;  Surgeon: Merlin Milligan MD;  Location: [facility];  Service: OB/GYN;  Laterality: N/A;    FOOT SURGERY      ROBOT-ASSISTED LAPAROSCOPIC HYSTERECTOMY N/A 10/28/2021    Procedure: ROBOTIC HYSTERECTOMY;  Surgeon: Merlin Milligan MD;  Location: [facility] OR;  Service: OB/GYN;  Laterality: N/A;    ROBOT-ASSISTED LAPAROSCOPIC SALPINGO-OOPHORECTOMY Bilateral 10/28/2021    Procedure: ROBOTIC SALPINGO-OOPHORECTOMY;  Surgeon: Merlin Milligan MD;  Location: [facility];  Service: OB/GYN;  Laterality: Bilateral;    TUBAL LIGATION Bilateral      Past Medical History:   Diagnosis Date    Asthma     Foot pain     GERD (gastroesophageal reflux disease)     Ovarian mass     Pelvic mass     Pelvic pain     Plantar fasciitis      Family History   Problem Relation Age of Onset    Arthritis Mother     Heart disease Father     Hypertension Father     Kidney disease Father     Colon cancer Neg Hx     Breast cancer Neg Hx     Ovarian cancer Neg Hx         Social History:   Marital Status:   Alcohol History:  reports that " she does not currently use alcohol after a past usage of about 1.0 standard drink per week.  Tobacco History:  reports that she has never smoked. She has never used smokeless tobacco.  Drug History:  reports no history of drug use.    Review of patient's allergies indicates:  No Known Allergies    Current Outpatient Medications   Medication Sig Dispense Refill    naproxen (NAPROSYN) 250 MG tablet Take 250 mg by mouth 2 (two) times daily.      diclofenac (VOLTAREN) 50 MG EC tablet Take 1 tablet (50 mg total) by mouth 3 (three) times daily. (Patient not taking: Reported on 2/8/2023) 15 tablet 0    omeprazole (PRILOSEC) 40 MG capsule Take 1 capsule (40 mg total) by mouth once daily. 30 capsule 2     No current facility-administered medications for this visit.       Review of Systems   Constitutional:  Negative for chills, fatigue, fever and unexpected weight change.   HENT:  Negative for hearing loss and trouble swallowing.    Eyes:  Negative for photophobia and visual disturbance.   Respiratory:  Negative for cough, shortness of breath and wheezing.    Cardiovascular:  Negative for chest pain, palpitations and leg swelling.   Gastrointestinal:  Negative for abdominal pain and nausea.   Genitourinary:  Negative for dysuria and frequency.   Musculoskeletal:  Positive for arthralgias and gait problem. Negative for back pain, joint swelling and myalgias.   Skin:  Negative for rash and wound.   Neurological:  Negative for tremors, seizures, weakness, numbness and headaches.   Hematological:  Does not bruise/bleed easily.       Objective:        Physical Exam:   Foot Exam    General  General Appearance: appears stated age and healthy   Orientation: alert and oriented to person, place, and time   Affect: appropriate   Gait: antalgic       Left Foot/Ankle      Inspection and Palpation  Ecchymosis: none  Tenderness: calcaneus tenderness (Distal Achilles tendon just proximal to calcaneal insertion)  Swelling: (achilles  insertion)  Arch: pes planus  Hammertoes: absent  Claw toes: absent  Skin Exam: skin intact;   Neurovascular  Dorsalis pedis: 2+  Posterior tibial: 2+  Capillary refill: 2+  Saphenous nerve sensation: normal  Tibial nerve sensation: normal  Superficial peroneal nerve sensation: normal  Deep peroneal nerve sensation: normal  Sural nerve sensation: normal    Edema  Type of edema: non-pitting    Muscle Strength  Ankle dorsiflexion: 5  Ankle plantar flexion: 5  Ankle inversion: 5  Ankle eversion: 5  Great toe extension: 5  Great toe flexion: 5    Range of Motion    Passive  Ankle dorsiflexion: 5, pain    Active  Plantar flexion: pain    Tests  Anterior drawer: negative   Calcaneal squeeze: negative   Talar tilt: negative   PT Tinel's sign: negative  Paresthesia: negative  Comments  There is an area of palpable thickening in the distal Achilles tendon.  No palpable void is noted.  Physical Exam  Cardiovascular:      Pulses:           Dorsalis pedis pulses are 2+ on the left side.        Posterior tibial pulses are 2+ on the left side.             Left Ankle/Foot Exam     Comments:  There is an area of palpable thickening in the distal Achilles tendon.  No palpable void is noted.      Muscle Strength   Left Lower Extremity   Ankle Dorsiflexion:  5   Plantar flexion:  5/5     Vascular Exam       Left Pulses  Dorsalis Pedis:      2+  Posterior Tibial:      2+         Imaging: MRI Ankle Without Contrast Left  EXAMINATION: MRI left ankle.    CLINICAL INFORMATION:  Pain and swelling    PROCEDURE:  Multiplanar multisequence MR images were obtained through the left ankle.    COMPARISON: MRI dated 8/5/2021    FINDINGS:  The anterior and posterior tibiofibular and talofibular, deltoid, and sinus tarsi ligaments are normal in signal intensity and morphology.    There is no evidence of edema in the region of the sinus tarsi.    There is mild tenosynovitis involving the tibialis posterior, flexor digitorum longus and proximal  peroneal tendons. The flexor hallicus longus, and extensor tendons are normal in signal intensity and morphology.    Again seen is a partial-thickness tear involving the distal Achilles tendon with retrocalcaneal bursitis.    IMPRESSION:  1. Partial-thickness tear along the distal Achilles tendon with surrounding retrocalcaneal bursitis is again noted.  2. Tenosynovitis as described above.    Electronically signed by:  Ana María Urias MD  1/31/2023 2:40 PM [facility] Workstation: [id number]               Assessment:       1. Achilles tendinosis of left lower extremity    2. Exostosis of left posterior calcaneus    3. Equinus contracture of left ankle    4. Pain of joint of left ankle and foot    5. Chronic pain of left ankle      Plan:   Achilles tendinosis of left lower extremity    Exostosis of left posterior calcaneus    Equinus contracture of left ankle    Pain of joint of left ankle and foot    Chronic pain of left ankle      Follow up Patient will be scheduled for outpatient surgery.    Procedures        MRI findings and images discussed in detail with the patient.  We discussed different conservative and surgical treatment options.  At this time patient wishes to proceed with surgical intervention.    Patient was educated about the entire pre, caitie and post-operative time period.  They  were educated about the pro's and con's of surgery.  All conservative measures have been exhausted. Patient understands the particular risks involved which may occur in connection with the procedure proposed including pain, swelling, infection, stiffness, decreased ROM, recurrence, rejection, numbness, delayed healing, scar formation.    Patient was educated that their diagnosis may be surgically treated.  The patient's problem will probably advance and usually will not get better without surgery.  All of the pre-operative treatment plans have been exhausted or will no longer be successful at this point in time.  Patient was  told of the possible outcomes and expectations of the surgical procedure.  They  will need to be followed-up post-operatively.  Today, pictures were drawn, questions answered.      We discussed the following surgical procedures:  1.  Repair of Achilles tendon left  2.  Resection of calcaneal exostosis left  3.  Possible gastrocnemius recession left       Counseling:     I provided patient education verbally regarding:   Patient diagnosis, treatment options, as well as alternatives, risks, and benefits.     Discussed treatment of Achilles tendonitis with rest, ice, oral NSAID, topical antiinflammatory creams, heel lifts, no barefoot, and cam walker boot if needed. Explained the importance of proper stretching. Discussed possibility of MRI for further evaluation if symptoms do not improve.     I discussed calcific achilles tendenosis and possible tear of the insertion.  I discussed conservative treatment with open back wedge shoes, heel lifts in running shoes, ROM with gentle stretching, possible long leg CAM walker boot cast with heel lifts, possible physical therapy.  If pain continues or if there is a concern of partial tear of the tendon thenan MRI will be ordered.        This note was created using Dragon voice recognition software that occasionally misinterpreted phrases or words.

## 2023-02-17 ENCOUNTER — PATIENT MESSAGE (OUTPATIENT)
Dept: PODIATRY | Facility: CLINIC | Age: 53
End: 2023-02-17
Payer: COMMERCIAL

## 2023-02-20 NOTE — TELEPHONE ENCOUNTER
Spoke to patient to let her know Dr. Leonidas PENA is on Vacation and will not be back until 2/27/2023. Patient stated understanding.

## 2023-02-23 DIAGNOSIS — M67.88 PERONEAL TENDINOSIS, LEFT: ICD-10-CM

## 2023-02-23 DIAGNOSIS — M67.88 ACHILLES TENDINOSIS OF LEFT LOWER EXTREMITY: Primary | ICD-10-CM

## 2023-02-23 DIAGNOSIS — M24.572 EQUINUS CONTRACTURE OF LEFT ANKLE: ICD-10-CM

## 2023-02-23 DIAGNOSIS — M77.32 EXOSTOSIS OF LEFT POSTERIOR CALCANEUS: ICD-10-CM

## 2023-02-28 ENCOUNTER — TELEPHONE (OUTPATIENT)
Dept: PODIATRY | Facility: CLINIC | Age: 53
End: 2023-02-28
Payer: COMMERCIAL

## 2023-02-28 ENCOUNTER — HOSPITAL ENCOUNTER (OUTPATIENT)
Dept: PREADMISSION TESTING | Facility: HOSPITAL | Age: 53
Discharge: HOME | End: 2023-02-28
Attending: PODIATRIST
Payer: COMMERCIAL

## 2023-02-28 ENCOUNTER — HOSPITAL ENCOUNTER (OUTPATIENT)
Dept: RADIOLOGY | Facility: HOSPITAL | Age: 53
Discharge: HOME | End: 2023-02-28
Attending: PODIATRIST
Payer: COMMERCIAL

## 2023-02-28 ENCOUNTER — PATIENT MESSAGE (OUTPATIENT)
Dept: PODIATRY | Facility: CLINIC | Age: 53
End: 2023-02-28
Payer: COMMERCIAL

## 2023-02-28 VITALS
HEIGHT: 63 IN | SYSTOLIC BLOOD PRESSURE: 114 MMHG | RESPIRATION RATE: 18 BRPM | DIASTOLIC BLOOD PRESSURE: 75 MMHG | BODY MASS INDEX: 33.98 KG/M2 | WEIGHT: 191.81 LBS | TEMPERATURE: 98 F | HEART RATE: 99 BPM | OXYGEN SATURATION: 96 %

## 2023-02-28 DIAGNOSIS — Z01.818 PRE-OP EXAMINATION: ICD-10-CM

## 2023-02-28 DIAGNOSIS — M67.88 ACHILLES TENDINOSIS OF LEFT LOWER EXTREMITY: ICD-10-CM

## 2023-02-28 DIAGNOSIS — M77.32 EXOSTOSIS OF LEFT POSTERIOR CALCANEUS: ICD-10-CM

## 2023-02-28 DIAGNOSIS — M24.572 EQUINUS CONTRACTURE OF LEFT ANKLE: ICD-10-CM

## 2023-02-28 LAB
ALBUMIN SERPL BCP-MCNC: 3.9 G/DL (ref 3.5–5.2)
ALP SERPL-CCNC: 70 U/L (ref 55–135)
ALT SERPL W/O P-5'-P-CCNC: 16 U/L (ref 10–44)
ANION GAP SERPL CALC-SCNC: 8 MMOL/L (ref 8–16)
AST SERPL-CCNC: 23 U/L (ref 10–40)
BASOPHILS # BLD AUTO: 0.07 K/UL (ref 0–0.2)
BASOPHILS NFR BLD: 0.8 % (ref 0–1.9)
BILIRUB SERPL-MCNC: 0.8 MG/DL (ref 0.1–1)
BUN SERPL-MCNC: 13 MG/DL (ref 6–20)
CALCIUM SERPL-MCNC: 9.6 MG/DL (ref 8.7–10.5)
CHLORIDE SERPL-SCNC: 101 MMOL/L (ref 95–110)
CO2 SERPL-SCNC: 28 MMOL/L (ref 23–29)
CREAT SERPL-MCNC: 0.7 MG/DL (ref 0.5–1.4)
DIFFERENTIAL METHOD: NORMAL
EOSINOPHIL # BLD AUTO: 0.2 K/UL (ref 0–0.5)
EOSINOPHIL NFR BLD: 2.4 % (ref 0–8)
ERYTHROCYTE [DISTWIDTH] IN BLOOD BY AUTOMATED COUNT: 14.1 % (ref 11.5–14.5)
EST. GFR  (NO RACE VARIABLE): >60 ML/MIN/1.73 M^2
GLUCOSE SERPL-MCNC: 92 MG/DL (ref 70–110)
HCT VFR BLD AUTO: 37.6 % (ref 37–48.5)
HGB BLD-MCNC: 12.1 G/DL (ref 12–16)
IMM GRANULOCYTES # BLD AUTO: 0.03 K/UL (ref 0–0.04)
IMM GRANULOCYTES NFR BLD AUTO: 0.3 % (ref 0–0.5)
LYMPHOCYTES # BLD AUTO: 4 K/UL (ref 1–4.8)
LYMPHOCYTES NFR BLD: 44.3 % (ref 18–48)
MCH RBC QN AUTO: 28.1 PG (ref 27–31)
MCHC RBC AUTO-ENTMCNC: 32.2 G/DL (ref 32–36)
MCV RBC AUTO: 87 FL (ref 82–98)
MONOCYTES # BLD AUTO: 0.8 K/UL (ref 0.3–1)
MONOCYTES NFR BLD: 8.8 % (ref 4–15)
NEUTROPHILS # BLD AUTO: 3.9 K/UL (ref 1.8–7.7)
NEUTROPHILS NFR BLD: 43.4 % (ref 38–73)
NRBC BLD-RTO: 0 /100 WBC
PLATELET # BLD AUTO: 320 K/UL (ref 150–450)
PMV BLD AUTO: 9.5 FL (ref 9.2–12.9)
POTASSIUM SERPL-SCNC: 4 MMOL/L (ref 3.5–5.1)
PROT SERPL-MCNC: 8.1 G/DL (ref 6–8.4)
RBC # BLD AUTO: 4.3 M/UL (ref 4–5.4)
SODIUM SERPL-SCNC: 137 MMOL/L (ref 136–145)
WBC # BLD AUTO: 8.91 K/UL (ref 3.9–12.7)

## 2023-02-28 PROCEDURE — 85025 COMPLETE CBC W/AUTO DIFF WBC: CPT | Performed by: PODIATRIST

## 2023-02-28 PROCEDURE — 93005 ELECTROCARDIOGRAM TRACING: CPT | Performed by: INTERNAL MEDICINE

## 2023-02-28 PROCEDURE — 93010 EKG 12-LEAD: ICD-10-PCS | Mod: ,,, | Performed by: INTERNAL MEDICINE

## 2023-02-28 PROCEDURE — 93010 ELECTROCARDIOGRAM REPORT: CPT | Mod: ,,, | Performed by: INTERNAL MEDICINE

## 2023-02-28 PROCEDURE — 80053 COMPREHEN METABOLIC PANEL: CPT | Performed by: PODIATRIST

## 2023-02-28 PROCEDURE — 71046 X-RAY EXAM CHEST 2 VIEWS: CPT | Mod: TC

## 2023-02-28 PROCEDURE — 36415 COLL VENOUS BLD VENIPUNCTURE: CPT | Performed by: PODIATRIST

## 2023-02-28 NOTE — TELEPHONE ENCOUNTER
----- Message from Alexey Lauren DPM sent at 2/28/2023  5:43 PM CST -----  Regarding: RE: leave of absence paperwork    ----- Message -----  From: Gilma Acosta  Sent: 2/28/2023   3:39 PM CST  To: Alexey Lauren DPM  Subject: leave of absence paperwork                       Pt called asking is her leave of absence paperwork, she is having surgery on 3/2/2023 and was checking on her paperwork.     Ms Phillips can be reached at [phone]    Thank you,   Gilma

## 2023-02-28 NOTE — DISCHARGE INSTRUCTIONS
To confirm, Your doctor has instructed you that surgery is scheduled for:   Thursday, March 2, 2023    Pre-Op will call the afternoon prior to surgery between 4:00 and 6:00 PM with the final arrival time.    Wednesday, March 1, 2023    Please report to [facility] via [facility] entrance. Check in at registration desk.    Do not eat or drink anything after midnight the night before your surgery - THIS INCLUDES  WATER, GUM, MINTS AND CANDY.  YOU MAY BRUSH YOUR TEETH BUT DO NOT SWALLOW       PLEASE NOTE:  The surgery schedule has many variables which may affect the time of your surgery case.  Family members should be available if your surgery time changes.  Plan to be here the day of your procedure between 4-6 hours.      DO NOT TAKE THESE MEDICATIONS 5-7 DAYS PRIOR to your procedure or per your surgeon's request: ASPIRIN, ALEVE, ADVIL, IBUPROFEN,  ELLA SELTZER, BC , FISH OIL , VITAMIN E, HERBALS  (May take Tylenol)                                                        IMPORTANT INSTRUCTIONS      Shower the night before AND the morning of your procedure with a Chlorhexidine wash such as Hibiclens or Dial antibacterial soap from the neck down. Do not apply any deodorants, lotions or powders after each shower.  Do not get it on your face or in your eyes.  You may use your own shampoo and face wash. This helps your skin to be as bacteria free as possible.  DO NOT remove hair from the surgery site.  Do not shave the incision site unless you are given specific instructions to do so.    Sleep in a bed with clean sheets.  Do not sleep with a pet in the bed.   Please leave all jewelry, piercing's and valuables at home.     Make arrangements in advance for transportation home by a responsible adult.    You must make arrangements for transportation, TAXI'S, UBER'S OR LYFTS ARE NOT ALLOWED.      If you have any questions about these instructions, call Pre-Op Admit  Nursing at [phone] or the Pre-Op Day Surgery Unit  at [phone].

## 2023-03-02 ENCOUNTER — HOSPITAL ENCOUNTER (OUTPATIENT)
Facility: HOSPITAL | Age: 53
Discharge: HOME | End: 2023-03-02
Attending: PODIATRIST | Admitting: PODIATRIST
Payer: COMMERCIAL

## 2023-03-02 ENCOUNTER — ANESTHESIA EVENT (OUTPATIENT)
Dept: SURGERY | Facility: HOSPITAL | Age: 53
End: 2023-03-02
Payer: COMMERCIAL

## 2023-03-02 ENCOUNTER — ANESTHESIA (OUTPATIENT)
Dept: SURGERY | Facility: HOSPITAL | Age: 53
End: 2023-03-02
Payer: COMMERCIAL

## 2023-03-02 ENCOUNTER — HOSPITAL ENCOUNTER (OUTPATIENT)
Dept: RADIOLOGY | Facility: HOSPITAL | Age: 53
Discharge: HOME | End: 2023-03-02
Attending: PODIATRIST | Admitting: PODIATRIST
Payer: COMMERCIAL

## 2023-03-02 VITALS
HEIGHT: 63 IN | WEIGHT: 192 LBS | RESPIRATION RATE: 17 BRPM | SYSTOLIC BLOOD PRESSURE: 104 MMHG | DIASTOLIC BLOOD PRESSURE: 66 MMHG | OXYGEN SATURATION: 95 % | BODY MASS INDEX: 34.02 KG/M2 | TEMPERATURE: 97 F | HEART RATE: 76 BPM

## 2023-03-02 DIAGNOSIS — M24.572 EQUINUS CONTRACTURE OF LEFT ANKLE: ICD-10-CM

## 2023-03-02 DIAGNOSIS — M67.88 PERONEAL TENDINOSIS, LEFT: ICD-10-CM

## 2023-03-02 DIAGNOSIS — M76.60 ACHILLES TENDONITIS: ICD-10-CM

## 2023-03-02 DIAGNOSIS — M67.88 ACHILLES TENDINOSIS OF LEFT LOWER EXTREMITY: ICD-10-CM

## 2023-03-02 DIAGNOSIS — M77.32 EXOSTOSIS OF LEFT POSTERIOR CALCANEUS: ICD-10-CM

## 2023-03-02 PROCEDURE — 36000708 HC OR TIME LEV III 1ST 15 MIN: Performed by: PODIATRIST

## 2023-03-02 PROCEDURE — C9290 INJ, BUPIVACAINE LIPOSOME: HCPCS

## 2023-03-02 PROCEDURE — D9220A PRA ANESTHESIA: Mod: CRNA,,, | Performed by: NURSE ANESTHETIST, CERTIFIED REGISTERED

## 2023-03-02 PROCEDURE — D9220A PRA ANESTHESIA: ICD-10-PCS | Mod: CRNA,,, | Performed by: NURSE ANESTHETIST, CERTIFIED REGISTERED

## 2023-03-02 PROCEDURE — 25000003 PHARM REV CODE 250: Performed by: NURSE ANESTHETIST, CERTIFIED REGISTERED

## 2023-03-02 PROCEDURE — D9220A PRA ANESTHESIA: ICD-10-PCS | Mod: ANES,,, | Performed by: ANESTHESIOLOGY

## 2023-03-02 PROCEDURE — 76000 FLUOROSCOPY <1 HR PHYS/QHP: CPT | Mod: TC

## 2023-03-02 PROCEDURE — 63600175 PHARM REV CODE 636 W HCPCS

## 2023-03-02 PROCEDURE — 63600175 PHARM REV CODE 636 W HCPCS: Performed by: ANESTHESIOLOGY

## 2023-03-02 PROCEDURE — 63600175 PHARM REV CODE 636 W HCPCS: Performed by: NURSE ANESTHETIST, CERTIFIED REGISTERED

## 2023-03-02 PROCEDURE — C1713 ANCHOR/SCREW BN/BN,TIS/BN: HCPCS | Performed by: PODIATRIST

## 2023-03-02 PROCEDURE — D9220A PRA ANESTHESIA: Mod: ANES,,, | Performed by: ANESTHESIOLOGY

## 2023-03-02 PROCEDURE — 27201423 OPTIME MED/SURG SUP & DEVICES STERILE SUPPLY: Performed by: PODIATRIST

## 2023-03-02 PROCEDURE — 71000015 HC POSTOP RECOV 1ST HR: Performed by: PODIATRIST

## 2023-03-02 PROCEDURE — 27650 PR REPAIR ACHILLES TENDON,PRIMARY: ICD-10-PCS | Mod: LT,,, | Performed by: PODIATRIST

## 2023-03-02 PROCEDURE — 36000709 HC OR TIME LEV III EA ADD 15 MIN: Performed by: PODIATRIST

## 2023-03-02 PROCEDURE — 37000008 HC ANESTHESIA 1ST 15 MINUTES: Performed by: PODIATRIST

## 2023-03-02 PROCEDURE — 28118 REMOVAL OF HEEL BONE: CPT | Mod: 51,LT,, | Performed by: PODIATRIST

## 2023-03-02 PROCEDURE — 71000039 HC RECOVERY, EACH ADD'L HOUR: Performed by: PODIATRIST

## 2023-03-02 PROCEDURE — 25000003 PHARM REV CODE 250: Performed by: ANESTHESIOLOGY

## 2023-03-02 PROCEDURE — 71000033 HC RECOVERY, INTIAL HOUR: Performed by: PODIATRIST

## 2023-03-02 PROCEDURE — 27650 REPAIR ACHILLES TENDON: CPT | Mod: LT,,, | Performed by: PODIATRIST

## 2023-03-02 PROCEDURE — 28118 PR REMOVAL OF HEEL BONE: ICD-10-PCS | Mod: 51,LT,, | Performed by: PODIATRIST

## 2023-03-02 PROCEDURE — 71000016 HC POSTOP RECOV ADDL HR: Performed by: PODIATRIST

## 2023-03-02 PROCEDURE — 37000009 HC ANESTHESIA EA ADD 15 MINS: Performed by: PODIATRIST

## 2023-03-02 DEVICE — IMPLANTABLE DEVICE: Type: IMPLANTABLE DEVICE | Site: FOOT | Status: FUNCTIONAL

## 2023-03-02 RX ORDER — PROMETHAZINE HYDROCHLORIDE 25 MG/1
25 TABLET ORAL EVERY 6 HOURS PRN
Status: CANCELLED | OUTPATIENT
Start: 2023-03-02

## 2023-03-02 RX ORDER — DIPHENHYDRAMINE HYDROCHLORIDE 50 MG/ML
INJECTION INTRAMUSCULAR; INTRAVENOUS
Status: DISCONTINUED | OUTPATIENT
Start: 2023-03-02 | End: 2023-03-02

## 2023-03-02 RX ORDER — DEXAMETHASONE SODIUM PHOSPHATE 4 MG/ML
INJECTION, SOLUTION INTRA-ARTICULAR; INTRALESIONAL; INTRAMUSCULAR; INTRAVENOUS; SOFT TISSUE
Status: DISCONTINUED | OUTPATIENT
Start: 2023-03-02 | End: 2023-03-02

## 2023-03-02 RX ORDER — DIPHENHYDRAMINE HYDROCHLORIDE 50 MG/ML
12.5 INJECTION INTRAMUSCULAR; INTRAVENOUS
Status: DISCONTINUED | OUTPATIENT
Start: 2023-03-02 | End: 2023-03-02 | Stop reason: HOSPADM

## 2023-03-02 RX ORDER — GABAPENTIN 300 MG/1
300 CAPSULE ORAL
Status: COMPLETED | OUTPATIENT
Start: 2023-03-02 | End: 2023-03-02

## 2023-03-02 RX ORDER — PHENYLEPHRINE HYDROCHLORIDE 10 MG/ML
INJECTION INTRAVENOUS
Status: DISCONTINUED | OUTPATIENT
Start: 2023-03-02 | End: 2023-03-02

## 2023-03-02 RX ORDER — ROCURONIUM BROMIDE 10 MG/ML
INJECTION, SOLUTION INTRAVENOUS
Status: DISCONTINUED | OUTPATIENT
Start: 2023-03-02 | End: 2023-03-02

## 2023-03-02 RX ORDER — SCOLOPAMINE TRANSDERMAL SYSTEM 1 MG/1
1 PATCH, EXTENDED RELEASE TRANSDERMAL
Status: DISCONTINUED | OUTPATIENT
Start: 2023-03-02 | End: 2023-03-02 | Stop reason: HOSPADM

## 2023-03-02 RX ORDER — ONDANSETRON 2 MG/ML
INJECTION INTRAMUSCULAR; INTRAVENOUS
Status: DISCONTINUED | OUTPATIENT
Start: 2023-03-02 | End: 2023-03-02

## 2023-03-02 RX ORDER — HYDROMORPHONE HYDROCHLORIDE 1 MG/ML
0.2 INJECTION, SOLUTION INTRAMUSCULAR; INTRAVENOUS; SUBCUTANEOUS EVERY 5 MIN PRN
Status: DISCONTINUED | OUTPATIENT
Start: 2023-03-02 | End: 2023-03-02 | Stop reason: HOSPADM

## 2023-03-02 RX ORDER — ACETAMINOPHEN 10 MG/ML
INJECTION, SOLUTION INTRAVENOUS
Status: DISCONTINUED | OUTPATIENT
Start: 2023-03-02 | End: 2023-03-02

## 2023-03-02 RX ORDER — KETOROLAC TROMETHAMINE 10 MG/1
10 TABLET, FILM COATED ORAL ONCE
Status: DISCONTINUED | OUTPATIENT
Start: 2023-03-02 | End: 2023-03-02 | Stop reason: HOSPADM

## 2023-03-02 RX ORDER — LIDOCAINE HYDROCHLORIDE 10 MG/ML
INJECTION, SOLUTION INTRAVENOUS
Status: DISCONTINUED | OUTPATIENT
Start: 2023-03-02 | End: 2023-03-02

## 2023-03-02 RX ORDER — ONDANSETRON 4 MG/1
8 TABLET, ORALLY DISINTEGRATING ORAL EVERY 8 HOURS PRN
Status: CANCELLED | OUTPATIENT
Start: 2023-03-02

## 2023-03-02 RX ORDER — HYDROCODONE BITARTRATE AND ACETAMINOPHEN 10; 325 MG/1; MG/1
1 TABLET ORAL EVERY 4 HOURS PRN
Status: CANCELLED | OUTPATIENT
Start: 2023-03-02

## 2023-03-02 RX ORDER — OXYCODONE HYDROCHLORIDE 5 MG/1
5 TABLET ORAL
Status: DISCONTINUED | OUTPATIENT
Start: 2023-03-02 | End: 2023-03-02 | Stop reason: HOSPADM

## 2023-03-02 RX ORDER — MIDAZOLAM HYDROCHLORIDE 1 MG/ML
INJECTION, SOLUTION INTRAMUSCULAR; INTRAVENOUS
Status: DISCONTINUED | OUTPATIENT
Start: 2023-03-02 | End: 2023-03-02

## 2023-03-02 RX ORDER — KETAMINE HYDROCHLORIDE 50 MG/ML
INJECTION, SOLUTION INTRAMUSCULAR; INTRAVENOUS
Status: DISCONTINUED | OUTPATIENT
Start: 2023-03-02 | End: 2023-03-02

## 2023-03-02 RX ORDER — FENTANYL CITRATE 50 UG/ML
INJECTION, SOLUTION INTRAMUSCULAR; INTRAVENOUS
Status: DISCONTINUED | OUTPATIENT
Start: 2023-03-02 | End: 2023-03-02

## 2023-03-02 RX ORDER — HYDROCODONE BITARTRATE AND ACETAMINOPHEN 10; 325 MG/1; MG/1
1 TABLET ORAL EVERY 6 HOURS PRN
Qty: 30 TABLET | Refills: 0 | Status: SHIPPED | OUTPATIENT
Start: 2023-03-02 | End: 2023-03-14 | Stop reason: SDUPTHER

## 2023-03-02 RX ORDER — HYDROCODONE BITARTRATE AND ACETAMINOPHEN 5; 325 MG/1; MG/1
1 TABLET ORAL EVERY 4 HOURS PRN
Status: CANCELLED | OUTPATIENT
Start: 2023-03-02

## 2023-03-02 RX ORDER — KETOROLAC TROMETHAMINE 30 MG/ML
15 INJECTION, SOLUTION INTRAMUSCULAR; INTRAVENOUS ONCE
Status: COMPLETED | OUTPATIENT
Start: 2023-03-02 | End: 2023-03-02

## 2023-03-02 RX ORDER — ONDANSETRON 4 MG/1
8 TABLET, ORALLY DISINTEGRATING ORAL EVERY 8 HOURS PRN
Qty: 30 TABLET | Refills: 0 | Status: SHIPPED | OUTPATIENT
Start: 2023-03-02 | End: 2024-02-15

## 2023-03-02 RX ORDER — DOXYCYCLINE 100 MG/1
100 CAPSULE ORAL 2 TIMES DAILY
Qty: 14 CAPSULE | Refills: 0 | Status: SHIPPED | OUTPATIENT
Start: 2023-03-02 | End: 2023-03-15

## 2023-03-02 RX ORDER — FAMOTIDINE 10 MG/ML
INJECTION INTRAVENOUS
Status: DISCONTINUED | OUTPATIENT
Start: 2023-03-02 | End: 2023-03-02

## 2023-03-02 RX ORDER — ONDANSETRON 2 MG/ML
4 INJECTION INTRAMUSCULAR; INTRAVENOUS DAILY PRN
Status: DISCONTINUED | OUTPATIENT
Start: 2023-03-02 | End: 2023-03-02 | Stop reason: HOSPADM

## 2023-03-02 RX ORDER — PROPOFOL 10 MG/ML
VIAL (ML) INTRAVENOUS
Status: DISCONTINUED | OUTPATIENT
Start: 2023-03-02 | End: 2023-03-02

## 2023-03-02 RX ADMIN — GABAPENTIN 300 MG: 300 CAPSULE ORAL at 11:03

## 2023-03-02 RX ADMIN — PHENYLEPHRINE HYDROCHLORIDE 100 MCG: 10 INJECTION INTRAVENOUS at 12:03

## 2023-03-02 RX ADMIN — DEXAMETHASONE SODIUM PHOSPHATE 8 MG: 4 INJECTION, SOLUTION INTRAMUSCULAR; INTRAVENOUS at 12:03

## 2023-03-02 RX ADMIN — PROPOFOL 100 MG: 10 INJECTION, EMULSION INTRAVENOUS at 11:03

## 2023-03-02 RX ADMIN — FENTANYL CITRATE 100 MCG: 50 INJECTION INTRAMUSCULAR; INTRAVENOUS at 11:03

## 2023-03-02 RX ADMIN — KETOROLAC TROMETHAMINE 15 MG: 30 INJECTION, SOLUTION INTRAMUSCULAR at 12:03

## 2023-03-02 RX ADMIN — DEXTROSE 2 G: 50 INJECTION, SOLUTION INTRAVENOUS at 12:03

## 2023-03-02 RX ADMIN — SODIUM CHLORIDE: 0.9 INJECTION, SOLUTION INTRAVENOUS at 11:03

## 2023-03-02 RX ADMIN — SCOPOLAMINE 1 PATCH: 1 PATCH TRANSDERMAL at 11:03

## 2023-03-02 RX ADMIN — KETAMINE HYDROCHLORIDE 50 MG: 50 INJECTION, SOLUTION INTRAMUSCULAR; INTRAVENOUS at 12:03

## 2023-03-02 RX ADMIN — LIDOCAINE HYDROCHLORIDE 100 MG: 10 INJECTION, SOLUTION INTRAVENOUS at 11:03

## 2023-03-02 RX ADMIN — MIDAZOLAM 2 MG: 1 INJECTION INTRAMUSCULAR; INTRAVENOUS at 11:03

## 2023-03-02 RX ADMIN — FAMOTIDINE 20 MG: 10 INJECTION, SOLUTION INTRAVENOUS at 12:03

## 2023-03-02 RX ADMIN — ACETAMINOPHEN 1000 MG: 10 INJECTION, SOLUTION INTRAVENOUS at 12:03

## 2023-03-02 RX ADMIN — ONDANSETRON 4 MG: 2 INJECTION INTRAMUSCULAR; INTRAVENOUS at 12:03

## 2023-03-02 RX ADMIN — DIPHENHYDRAMINE HYDROCHLORIDE 6.25 MG: 50 INJECTION INTRAMUSCULAR; INTRAVENOUS at 12:03

## 2023-03-02 RX ADMIN — SUGAMMADEX 200 MG: 100 INJECTION, SOLUTION INTRAVENOUS at 01:03

## 2023-03-02 RX ADMIN — ROCURONIUM BROMIDE 50 MG: 10 INJECTION, SOLUTION INTRAVENOUS at 11:03

## 2023-03-02 NOTE — PLAN OF CARE
VSS. Patient resting comfortably in no acute distress. Patient has no complaints of pain at this time.

## 2023-03-02 NOTE — ANESTHESIA POSTPROCEDURE EVALUATION
Anesthesia Post Evaluation    Patient: Joselin Phillips    Procedure(s) Performed: Procedure(s) (LRB):  REPAIR, TENDON, LOWER EXTREMITY (Left)  EXCISION,YNES DEFORMITY,CALCANEUS (Left)  RESECTION, MUSCLE, GASTROCNEMIUS (Left)    Final Anesthesia Type: general      Patient location during evaluation: PACU  Patient participation: Yes- Able to Participate  Level of consciousness: awake and alert  Post-procedure vital signs: reviewed and stable  Pain management: adequate  Airway patency: patent    PONV status at discharge: No PONV  Anesthetic complications: no      Cardiovascular status: stable  Respiratory status: unassisted and spontaneous ventilation  Hydration status: euvolemic  Follow-up not needed.          Vitals Value Taken Time   /68 03/02/23 1500   Temp 36.3 °C (97.4 °F) 03/02/23 1430   Pulse 78 03/02/23 1500   Resp 16 03/02/23 1500   SpO2 95 % 03/02/23 1500         No case tracking events are documented in the log.      Pain/Master Score: Master Score: 9 (3/2/2023  2:33 PM)

## 2023-03-02 NOTE — ANESTHESIA PREPROCEDURE EVALUATION
03/02/2023  Joselin Phillips is a 52 y.o., female.    Tobacco Use:  The patient  reports that she has never smoked. She has never used smokeless tobacco.     Results for orders placed or performed during the hospital encounter of 02/28/23   EKG 12-lead    Collection Time: 02/28/23 12:39 PM    Narrative    Test Reason : M77.32,M67.88,M24.572,    Vent. Rate : 095 BPM     Atrial Rate : 095 BPM     P-R Int : 170 ms          QRS Dur : 080 ms      QT Int : 356 ms       P-R-T Axes : 054 024 033 degrees     QTc Int : 447 ms    Normal sinus rhythm  Normal ECG  No previous ECGs available  Confirmed by Triston Fuller MD ([id number]) on 2/28/2023 8:26:49 PM    Referred By:             Confirmed By:Triston Fuller MD             Lab Results   Component Value Date    WBC 8.91 02/28/2023    HGB 12.1 02/28/2023    HCT 37.6 02/28/2023    MCV 87 02/28/2023     02/28/2023     BMP  Lab Results   Component Value Date     02/28/2023    K 4.0 02/28/2023     02/28/2023    CO2 28 02/28/2023    BUN 13 02/28/2023    CREATININE 0.7 02/28/2023    CALCIUM 9.6 02/28/2023    ANIONGAP 8 02/28/2023    GLU 92 02/28/2023     07/22/2022    GLU 71 07/16/2021       No results found for this or any previous visit.        Pre-op Assessment    I have reviewed the Patient Summary Reports.     I have reviewed the Nursing Notes. I have reviewed the NPO Status.   I have reviewed the Medications.     Review of Systems  Anesthesia Hx:  No problems with previous Anesthesia  Denies Family Hx of Anesthesia complications.   Denies Personal Hx of Anesthesia complications.   Social:  No Alcohol Use, Non-Smoker    Cardiovascular:   hyperlipidemia ECG has been reviewed.    Pulmonary:   Asthma asymptomatic    Hepatic/GI:   GERD, well controlled    Musculoskeletal:   Decreased ROM of lumbar spine    Equinus contracture of ankle Spine  Disorders: lumbar Chronic Pain    Endocrine:  Obesity / BMI > 30      Physical Exam  General: Well nourished, Cooperative, Alert and Oriented    Airway:  Mallampati: III / II  Mouth Opening: Normal  TM Distance: > 6 cm  Tongue: Normal  Neck ROM: Normal ROM    Dental:  Intact    Chest/Lungs:  Clear to auscultation, Normal Respiratory Rate    Heart:  Rate: Normal  Rhythm: Regular Rhythm    Musculoskeletal:Equinus contracture of ankle      Anesthesia Plan  Type of Anesthesia, risks & benefits discussed:    Anesthesia Type: Gen ETT  Intra-op Monitoring Plan: Standard ASA Monitors  Post Op Pain Control Plan: multimodal analgesia and IV/PO Opioids PRN  Induction:  IV  Airway Plan: Direct and Video, Post-Induction  Informed Consent: Informed consent signed with the Patient and all parties understand the risks and agree with anesthesia plan.  All questions answered.   ASA Score: 2  Anesthesia Plan Notes:     GETA  Benadryl 6.25 mg iv, Decadron 8 mg, iv Zofran 4 mg iv, Pepcid 20 mg iv   Ofirmev 1000 mg iv, Toradol 15 mg iv, Neurontin 300 mg po, Scopolamine Patch, Ketamine iv,  Sugammadex     Ready For Surgery From Anesthesia Perspective.     .

## 2023-03-02 NOTE — DISCHARGE INSTRUCTIONS
DO NOT TAKE BOOT OFF FOR ANY REASON UNTIL FIRST POSTOP VISIT. NON-WEIGHTBEARING ON FOOT. YOU MAY TAKE IBUPROFEN 600 MG OVER THE COUNTER FOR BREAKTHROUGH PAIN.

## 2023-03-02 NOTE — TRANSFER OF CARE
"Anesthesia Transfer of Care Note    Patient: Joselin Phillips    Procedure(s) Performed: Procedure(s) (LRB):  REPAIR, TENDON, LOWER EXTREMITY (Left)  EXCISION,YNES DEFORMITY,CALCANEUS (Left)  RESECTION, MUSCLE, GASTROCNEMIUS (Left)    Patient location: PACU    Anesthesia Type: general    Transport from OR: Transported from OR on room air with adequate spontaneous ventilation    Post pain: adequate analgesia    Post assessment: no apparent anesthetic complications    Post vital signs: stable    Level of consciousness: awake    Nausea/Vomiting: no nausea/vomiting    Complications: none    Transfer of care protocol was followed      Last vitals:   Visit Vitals  /78   Pulse 96   Temp 36.7 °C (98.1 °F) (Oral)   Resp 18   Ht 5' 3" (1.6 m)   Wt 87.1 kg (192 lb)   LMP 08/01/2021 (Approximate)   SpO2 98%   Breastfeeding No   BMI 34.01 kg/m²     "

## 2023-03-02 NOTE — OP NOTE
Operative Report     Patient name: Joselin Phillips   MRN: [id number]  Date of surgery: 3/2/2023    Surgeon: Alexey Lauren DPM   Assistant:  None    Preoperative diagnosis:  1.  Achilles tendinosis left 2.  Retrocalcaneal exostosis left   Postoperative diagnosis:  Same as above  Procedure:  1.  Repair of Achilles tendon left 2.  Resection of calcaneal exostosis left  Anesthesia:  General  Hemostasis:  Pneumatic thigh tourniquet at 300 mmHg  Estimated blood loss:  2 mL   Specimen:  None  Complications: None  Condition upon discharge: Stable    Procedure in detail:  Patient was brought the operating room and following induction general anesthesia was placed on the operative table in a prone position.  A well-padded pneumatic thigh tourniquet was placed on the patient's left thigh and set at 300 mmHg.  The left lower extremity was then prepped scrubbed and draped in normal aseptic manner.  Time-out was then called.  An Esmarch bandage was utilized to exsanguinate the left lower extremity in the left pneumatic thigh tourniquet was inflated to 300 mmHg.  At this time attention was directed the posterior left foot and ankle utilizing 15. Blade a linear longitudinal incision was made overlying the distal Achilles tendon to its insertion.  Blunt dissection was carried down through the subcutaneous tissues ensuring to retract all vital neurovascular structures.  Bleeders cauterized necessary.  Incision was made through the paratenon this was freed from around the tendon.  Incision was then made the midportion of the Achilles tendon down to its calcaneal insertion.  Upon dissection into the central Achilles there was noted to be some scattered areas fibrotic scarred tissue.  There was also noted to be a large amount of calcification at the Achilles insertion.  Following adequate soft tissue exposure of the posterior calcaneus was inspected and there was noted to be exostosis formation posteriorly as well as a small  Keren's deformity of the superior calcaneus.  At this time utilizing a sagittal saw the posterior exostosis in the house for was resected.  The bone was then smoothed.  The calcaneus was then inspected utilizing intraoperative fluoroscopy there was noted to be no remaining bony prominence.  At this time the scar fibrotic tissue was excised from the central portion of the Achilles tendon utilizing 15 blade.  A repair the Achilles tendon was performed utilizing 2-0 FiberWire.  Four guide holes were then drilled in the posterior calcaneus for the Arthrex Achilles bridge anchors.  The 2 superior anchors were then inserted per manufacture instructions.  Following insertion the suture from the proximal anchors was passed to the medial lateral aspects of the distal Achilles tendon.  One suture from each of the proximal anchors was then inserted into each of the distal anchors per manufacture instructions.  Once these were fully inserted there was noted to be good coaptation of the Achilles tendon back on the calcaneus.  Patient was noted to have approximately 5° dorsiflexion past neutral on the table.  It was decided not to perform a gastrocnemius recession.  The wound was flushed with copious amounts of sterile saline.  The deep tissues and paratenon were closed utilizing 2-0 Vicryl.  Subcutaneous tissues were closed utilizing 3-0 Vicryl.  The skin was closed utilizing 4-0 Vicryl in a running subcuticular fashion.  20 cc of 0.5% Marcaine plain and Exparel was utilized a block of the surgical site.  The foot was then dressed with Mastisol Steri-Strips Xeroform 4x4s Kerlix and an Ace wrap.  Pneumatic thigh tourniquet was deflated neurovascular status noted be intact to the left lower extremity.  A heel lift was placed under the left heel.  Patient was placed in a long leg Cam Walker boot.  Patient tolerated the procedure well.  She had anesthesia reversed and left the operating Room stable vitals.    1. Keep dressings,  clean, dry, and intact to surgical extremity.  2. Rest, ice, and elevate the surgical extremity.  3. Nonweightbearing to surgical extremity in Cam Walker boot  4. Take all medication as discussed at discharge.  5. Contact the clinic with any postoperative concerns or complications.  6. Follow up in one week for 1st post op.

## 2023-03-02 NOTE — ANESTHESIA PROCEDURE NOTES
Intubation    Date/Time: 3/2/2023 11:59 AM  Performed by: Emmy Howell CRNA  Authorized by: Navin Santos MD     Intubation:     Induction:  Intravenous    Intubated:  Postinduction    Mask Ventilation:  Easy mask    Attempts:  1    Attempted By:  CRNA    Method of Intubation:  Direct    Blade:  Larson 3    Laryngeal View Grade: Grade I - full view of cords      Difficult Airway Encountered?: No      Complications:  None    Airway Device:  Oral endotracheal tube    Airway Device Size:  7.5    Style/Cuff Inflation:  Cuffed (inflated to minimal occlusive pressure)    Tube secured:  21    Secured at:  The lips    Placement Verified By:  Capnometry    Complicating Factors:  None    Findings Post-Intubation:  BS equal bilateral and atraumatic/condition of teeth unchanged

## 2023-03-09 ENCOUNTER — OFFICE VISIT (OUTPATIENT)
Dept: PODIATRY | Facility: CLINIC | Age: 53
End: 2023-03-09
Payer: COMMERCIAL

## 2023-03-09 VITALS
RESPIRATION RATE: 18 BRPM | BODY MASS INDEX: 34.02 KG/M2 | HEIGHT: 63 IN | WEIGHT: 192 LBS | OXYGEN SATURATION: 96 % | HEART RATE: 80 BPM

## 2023-03-09 DIAGNOSIS — M77.32 EXOSTOSIS OF LEFT POSTERIOR CALCANEUS: ICD-10-CM

## 2023-03-09 DIAGNOSIS — M25.572 CHRONIC PAIN OF LEFT ANKLE: ICD-10-CM

## 2023-03-09 DIAGNOSIS — M25.572 PAIN OF JOINT OF LEFT ANKLE AND FOOT: ICD-10-CM

## 2023-03-09 DIAGNOSIS — M67.88 ACHILLES TENDINOSIS OF LEFT LOWER EXTREMITY: Primary | ICD-10-CM

## 2023-03-09 DIAGNOSIS — G89.29 CHRONIC PAIN OF LEFT ANKLE: ICD-10-CM

## 2023-03-09 PROCEDURE — 3008F BODY MASS INDEX DOCD: CPT | Mod: CPTII,S$GLB,, | Performed by: PODIATRIST

## 2023-03-09 PROCEDURE — 1160F PR REVIEW ALL MEDS BY PRESCRIBER/CLIN PHARMACIST DOCUMENTED: ICD-10-PCS | Mod: CPTII,S$GLB,, | Performed by: PODIATRIST

## 2023-03-09 PROCEDURE — 3008F PR BODY MASS INDEX (BMI) DOCUMENTED: ICD-10-PCS | Mod: CPTII,S$GLB,, | Performed by: PODIATRIST

## 2023-03-09 PROCEDURE — 1159F PR MEDICATION LIST DOCUMENTED IN MEDICAL RECORD: ICD-10-PCS | Mod: CPTII,S$GLB,, | Performed by: PODIATRIST

## 2023-03-09 PROCEDURE — 99024 PR POST-OP FOLLOW-UP VISIT: ICD-10-PCS | Mod: S$GLB,,, | Performed by: PODIATRIST

## 2023-03-09 PROCEDURE — 1159F MED LIST DOCD IN RCRD: CPT | Mod: CPTII,S$GLB,, | Performed by: PODIATRIST

## 2023-03-09 PROCEDURE — 99024 POSTOP FOLLOW-UP VISIT: CPT | Mod: S$GLB,,, | Performed by: PODIATRIST

## 2023-03-09 PROCEDURE — 1160F RVW MEDS BY RX/DR IN RCRD: CPT | Mod: CPTII,S$GLB,, | Performed by: PODIATRIST

## 2023-03-09 NOTE — PROGRESS NOTES
"  [address] [facility] [address] [facility] [address] [place] [address]  Phone: [phone]   [phone]    Patient's PCP:Orlando Mohan MD  Referring Provider:No ref. provider found    Subjective:      Chief Complaint: Post-op Evaluation ( Repair of Achilles tendon left 2.  Resection of calcaneal exostosis left)    Date of Surgery: 03/02/2023  Procedure:  Repair of Achilles tendon left 2.  Resection of calcaneal exostosis left    HPI:   Joselin Phillips is a 52 y.o. female who returns to the clinic today for her post-operative visit. Joselin Phillips rates pain a 3/10 on a pain scale. Compliance of Care: nonweightbearing in cam walker boot cast using crutches to ambulate, ace wrap and dressing intact with drainage.     Vitals:    03/09/23 1559   Pulse: 80   Resp: 18   SpO2: 96%   Weight: 87.1 kg (192 lb)   Height: 5' 3" (1.6 m)   PainSc:   3        Past Surgical History:   Procedure Laterality Date    COLONOSCOPY N/A 09/21/2021    Procedure: COLONOSCOPY;  Surgeon: Zach Hemphill MD;  Location: [facility];  Service: Endoscopy;  Laterality: N/A;    CYSTOSCOPY N/A 10/28/2021    Procedure: CYSTOSCOPY;  Surgeon: Merlin Milligan MD;  Location: [facility] OR;  Service: OB/GYN;  Laterality: N/A;    EXCISION,YNES DEFORMITY,CALCANEUS Left 3/2/2023    Procedure: EXCISION,YNES DEFORMITY,CALCANEUS;  Surgeon: Alexey Lauren DPM;  Location: [facility] OR;  Service: Podiatry;  Laterality: Left;    FOOT SURGERY Left 2017    REPAIR OF TENDON OF LOWER EXTREMITY Left 3/2/2023    Procedure: REPAIR, TENDON, LOWER EXTREMITY;  Surgeon: Alexey Lauren DPM;  Location: [facility] OR;  Service: Podiatry;  Laterality: Left;    RESECTION OF GASTROCNEMIUS MUSCLE Left 3/2/2023    Procedure: RESECTION, MUSCLE, GASTROCNEMIUS;  Surgeon: Alexey Lauren DPM;  Location: [facility] OR;  Service: Podiatry;  Laterality: Left;    ROBOT-ASSISTED LAPAROSCOPIC HYSTERECTOMY N/A 10/28/2021    Procedure: ROBOTIC HYSTERECTOMY;  Surgeon: Merlin Milligan MD;  Location: [facility]" OR;  Service: OB/GYN;  Laterality: N/A;    ROBOT-ASSISTED LAPAROSCOPIC SALPINGO-OOPHORECTOMY Bilateral 10/28/2021    Procedure: ROBOTIC SALPINGO-OOPHORECTOMY;  Surgeon: Merlin Milligan MD;  Location: [facility] OR;  Service: OB/GYN;  Laterality: Bilateral;    TUBAL LIGATION Bilateral 10/29/2022     Past Medical History:   Diagnosis Date    Asthma     childhood-since resolved.    Foot pain     GERD (gastroesophageal reflux disease)     Ovarian mass     Pelvic mass     Pelvic pain     Plantar fasciitis      Family History   Problem Relation Age of Onset    Arthritis Mother     Heart disease Father     Hypertension Father     Kidney disease Father     Colon cancer Neg Hx     Breast cancer Neg Hx     Ovarian cancer Neg Hx         Social History:   Marital Status:   Alcohol History:  reports that she does not currently use alcohol after a past usage of about 1.0 standard drink per week.  Tobacco History:  reports that she has never smoked. She has never used smokeless tobacco.  Drug History:  reports no history of drug use.    Review of patient's allergies indicates:  No Known Allergies    Current Outpatient Medications   Medication Sig Dispense Refill    doxycycline (MONODOX) 100 MG capsule Take 1 capsule (100 mg total) by mouth 2 (two) times daily. for 7 days 14 capsule 0    HYDROcodone-acetaminophen (NORCO)  mg per tablet Take 1 tablet by mouth every 6 (six) hours as needed for Pain. 30 tablet 0    naproxen (NAPROSYN) 250 MG tablet Take 250 mg by mouth 2 (two) times daily.      ondansetron (ZOFRAN-ODT) 4 MG TbDL Take 2 tablets (8 mg total) by mouth every 8 (eight) hours as needed (nausea). 30 tablet 0     No current facility-administered medications for this visit.       Review of Systems   Constitutional:  Negative for chills, fatigue, fever and unexpected weight change.   HENT:  Negative for hearing loss and trouble swallowing.    Eyes:  Negative for photophobia and visual disturbance.   Respiratory:   Negative for cough, shortness of breath and wheezing.    Cardiovascular:  Negative for chest pain, palpitations and leg swelling.   Gastrointestinal:  Negative for abdominal pain and nausea.   Genitourinary:  Negative for dysuria and frequency.   Musculoskeletal:  Positive for arthralgias and gait problem. Negative for back pain, joint swelling and myalgias.   Skin:  Negative for rash and wound.   Neurological:  Negative for tremors, seizures, weakness, numbness and headaches.   Hematological:  Does not bruise/bleed easily.       Objective:        Post-op surgery of the left foot and ankle with normal healing, no signs of infection or dehiscence of wound. Normal post op exam today. No redness, no drainage, no increase in local temperature, no significant swelling, sutures.steri-strips are intact.     Imaging:  None    Physical Exam:   Foot Exam  Physical Exam       Assessment:       1. Achilles tendinosis of left lower extremity    2. Exostosis of left posterior calcaneus    3. Pain of joint of left ankle and foot    4. Chronic pain of left ankle      Plan:   Achilles tendinosis of left lower extremity    Exostosis of left posterior calcaneus    Pain of joint of left ankle and foot    Chronic pain of left ankle      Follow up in about 1 week (around 3/16/2023), or if symptoms worsen or fail to improve.    Procedures - None    The surgical dressing was removed showing no signs of infection, excess edema or malalignment. A new dry dressing was applied and patient was instructed to leave dressing intact until next visit or until instructed to remove.     CAM walker boot with non-weight bearing  Ice to painful area, 15 minutes at a time  Ace-wrap to help with swelling  No barefoot   Keep affected extremity elevated while seated      This note was created using Dragon voice recognition software that occasionally misinterpreted phrases or words.

## 2023-03-13 ENCOUNTER — PATIENT MESSAGE (OUTPATIENT)
Dept: PODIATRY | Facility: CLINIC | Age: 53
End: 2023-03-13
Payer: COMMERCIAL

## 2023-03-13 DIAGNOSIS — M67.88 ACHILLES TENDINOSIS OF LEFT LOWER EXTREMITY: Primary | ICD-10-CM

## 2023-03-14 RX ORDER — HYDROCODONE BITARTRATE AND ACETAMINOPHEN 10; 325 MG/1; MG/1
1 TABLET ORAL EVERY 6 HOURS PRN
Qty: 30 TABLET | Refills: 0 | Status: SHIPPED | OUTPATIENT
Start: 2023-03-14 | End: 2024-02-15

## 2023-03-17 NOTE — PATIENT INSTRUCTIONS
Understanding Achilles Tendon Repair Surgery  The Achilles tendon is a strong, fibrous cord in the back of your lower leg. It connects the muscles of your calf to your heel. Its the largest tendon in your body. It helps you walk, run, and jump. Achilles tendon repair surgery is a type of surgery to fix a damaged Achilles tendon.  Why is Achilles tendon repair surgery done?  The surgery is often needed if you have damage from a tear or rupture from a sudden (acute) injury. Or the damage may be from overuse, wear and tear, or from other conditions. This long-term (chronic) injury is also known as tendonitis or tendinopathy.  How is Achilles tendon repair surgery done?  An Achilles tendon repair is done by an orthopedic surgeon. This is a surgeon who treats bone, muscle, joint, and tendon problems. The surgery can be done in several ways. The surgeon will make a cut (incision) through the skin and muscle in the back of your calf. If you have minimally invasive surgery, the surgeon will make several smaller incisions instead of one large one. Your surgeon will make an incision through the sheath or covering of the Achilles tendon.  If the tendon is damaged, the surgeon may remove the damaged part and repair the rest of it. If you have severe damage, the surgeon may use a muscle or tendon from your calf, ankle, or foot for the repair.  What are the risks of Achilles tendon repair surgery?  Every surgery has risks. Risks of Achilles tendon repair include:  Bleeding  Nerve damage  Infection  Blood clots  Wound healing problems  Calf weakness  Complications from anesthesia  Continued pain in your foot and ankle  Risks depend on factors such as your age, your overall health, and the type of surgery. They also depend on the shape of your foot, muscles, and tendons. Ask your healthcare provider which risks apply most to you. Talk with him or her about any concerns you have.  Date Last Reviewed: 2/1/2017  © 9186-3143 [employer]  [employer]. [address] [place] [address]. All rights reserved. This information is not intended as a substitute for professional medical care. Always follow your healthcare professional's instructions.         Foot Surgery: Bone Spurs  A bone spur (an extra bone growth) can make walking and wearing shoes painful. Spurs may grow on top of any of the midfoot joints. These spurs may form a bump on the top of the foot. Bone spurs may also form on your toe. Sometimes a spur can form where the Achilles tendon connects to the heel bone. There are several nonsurgical treatments for bone spurs, but if these are not effective, surgery can be considered.     Spur on the back of the heel  The heel spur is removed, and the Achilles tendon is repositioned. Your foot will be placed in a cast. To keep you from bearing weight on this foot, you will need crutches for several weeks.    Midfoot joint spur  To get rid of the bump, the spur is removed on both sides of the joint. You can bear weight on your foot right after surgery. You will need to wear a surgical shoe for a few weeks.    Toe spur  Removing a toe spur involves making only a small skin incision. A tiny power rasp (similar to a dental mathew) or a special file is inserted to smooth the bone. After surgery, your foot will be bandaged, but you can walk on it right away. In some cases, you may need to wear a surgical shoe for a few weeks.  Date Last Reviewed: 10/15/2015  © 4782-8694 [employer]. [address] [place] [address]. All rights reserved. This information is not intended as a substitute for professional medical care. Always follow your healthcare professional's instructions.

## 2023-03-20 ENCOUNTER — OFFICE VISIT (OUTPATIENT)
Dept: PODIATRY | Facility: CLINIC | Age: 53
End: 2023-03-20
Payer: COMMERCIAL

## 2023-03-20 VITALS — WEIGHT: 192 LBS | HEIGHT: 63 IN | BODY MASS INDEX: 34.02 KG/M2

## 2023-03-20 DIAGNOSIS — G89.29 CHRONIC PAIN OF LEFT ANKLE: ICD-10-CM

## 2023-03-20 DIAGNOSIS — M25.572 PAIN OF JOINT OF LEFT ANKLE AND FOOT: ICD-10-CM

## 2023-03-20 DIAGNOSIS — M67.88 ACHILLES TENDINOSIS OF LEFT LOWER EXTREMITY: Primary | ICD-10-CM

## 2023-03-20 DIAGNOSIS — M77.32 EXOSTOSIS OF LEFT POSTERIOR CALCANEUS: ICD-10-CM

## 2023-03-20 DIAGNOSIS — M25.572 CHRONIC PAIN OF LEFT ANKLE: ICD-10-CM

## 2023-03-20 PROCEDURE — 1160F PR REVIEW ALL MEDS BY PRESCRIBER/CLIN PHARMACIST DOCUMENTED: ICD-10-PCS | Mod: CPTII,S$GLB,, | Performed by: PODIATRIST

## 2023-03-20 PROCEDURE — 99024 PR POST-OP FOLLOW-UP VISIT: ICD-10-PCS | Mod: S$GLB,,, | Performed by: PODIATRIST

## 2023-03-20 PROCEDURE — 3008F PR BODY MASS INDEX (BMI) DOCUMENTED: ICD-10-PCS | Mod: CPTII,S$GLB,, | Performed by: PODIATRIST

## 2023-03-20 PROCEDURE — 1160F RVW MEDS BY RX/DR IN RCRD: CPT | Mod: CPTII,S$GLB,, | Performed by: PODIATRIST

## 2023-03-20 PROCEDURE — 3008F BODY MASS INDEX DOCD: CPT | Mod: CPTII,S$GLB,, | Performed by: PODIATRIST

## 2023-03-20 PROCEDURE — 99024 POSTOP FOLLOW-UP VISIT: CPT | Mod: S$GLB,,, | Performed by: PODIATRIST

## 2023-03-20 PROCEDURE — 1159F PR MEDICATION LIST DOCUMENTED IN MEDICAL RECORD: ICD-10-PCS | Mod: CPTII,S$GLB,, | Performed by: PODIATRIST

## 2023-03-20 PROCEDURE — 1159F MED LIST DOCD IN RCRD: CPT | Mod: CPTII,S$GLB,, | Performed by: PODIATRIST

## 2023-03-20 NOTE — PROGRESS NOTES
"  [address] [facility] [address] [place]  [place] [address]  Phone: [phone]   [phone]    Patient's PCP:Orlando Mohan MD  Referring Provider:No ref. provider found    Subjective:      Chief Complaint: Post-op Evaluation    Date of Surgery: 03/02/2023  Procedure:  Repair of Achilles tendon left 2.  Resection of calcaneal exostosis left    HPI:   Joselin Phillips is a 52 y.o. female who returns to the clinic today for her post-operative visit. Joselin Phillips rates pain a 5/10 on a pain scale. Compliance of Care:  boot cast, crutches, dressing intact.     Vitals:    03/20/23 0848   Weight: 87.1 kg (192 lb)   Height: 5' 3" (1.6 m)   PainSc:   5        Past Surgical History:   Procedure Laterality Date    COLONOSCOPY N/A 09/21/2021    Procedure: COLONOSCOPY;  Surgeon: Zach Hemphill MD;  Location: [facility];  Service: Endoscopy;  Laterality: N/A;    CYSTOSCOPY N/A 10/28/2021    Procedure: CYSTOSCOPY;  Surgeon: Merlin Milligan MD;  Location: [facility] OR;  Service: OB/GYN;  Laterality: N/A;    EXCISION,YNES DEFORMITY,CALCANEUS Left 3/2/2023    Procedure: EXCISION,YNES DEFORMITY,CALCANEUS;  Surgeon: Alexey Lauren DPM;  Location: [facility] OR;  Service: Podiatry;  Laterality: Left;    FOOT SURGERY Left 2017    REPAIR OF TENDON OF LOWER EXTREMITY Left 3/2/2023    Procedure: REPAIR, TENDON, LOWER EXTREMITY;  Surgeon: Alexey Lauren DPM;  Location: [facility] OR;  Service: Podiatry;  Laterality: Left;    RESECTION OF GASTROCNEMIUS MUSCLE Left 3/2/2023    Procedure: RESECTION, MUSCLE, GASTROCNEMIUS;  Surgeon: Alexey Lauren DPM;  Location: [facility] OR;  Service: Podiatry;  Laterality: Left;    ROBOT-ASSISTED LAPAROSCOPIC HYSTERECTOMY N/A 10/28/2021    Procedure: ROBOTIC HYSTERECTOMY;  Surgeon: Merlin Milligan MD;  Location: [facility] OR;  Service: OB/GYN;  Laterality: N/A;    ROBOT-ASSISTED LAPAROSCOPIC SALPINGO-OOPHORECTOMY Bilateral 10/28/2021    Procedure: ROBOTIC SALPINGO-OOPHORECTOMY;  Surgeon: Merlin Milligan, " MD;  Location: [facility];  Service: OB/GYN;  Laterality: Bilateral;    TUBAL LIGATION Bilateral 10/29/2022     Past Medical History:   Diagnosis Date    Asthma     childhood-since resolved.    Foot pain     GERD (gastroesophageal reflux disease)     Ovarian mass     Pelvic mass     Pelvic pain     Plantar fasciitis      Family History   Problem Relation Age of Onset    Arthritis Mother     Heart disease Father     Hypertension Father     Kidney disease Father     Colon cancer Neg Hx     Breast cancer Neg Hx     Ovarian cancer Neg Hx         Social History:   Marital Status:   Alcohol History:  reports that she does not currently use alcohol after a past usage of about 1.0 standard drink per week.  Tobacco History:  reports that she has never smoked. She has never used smokeless tobacco.  Drug History:  reports no history of drug use.    Review of patient's allergies indicates:  No Known Allergies    Current Outpatient Medications   Medication Sig Dispense Refill    HYDROcodone-acetaminophen (NORCO)  mg per tablet Take 1 tablet by mouth every 6 (six) hours as needed for Pain. 30 tablet 0    naproxen (NAPROSYN) 250 MG tablet Take 250 mg by mouth 2 (two) times daily.      ondansetron (ZOFRAN-ODT) 4 MG TbDL Take 2 tablets (8 mg total) by mouth every 8 (eight) hours as needed (nausea). 30 tablet 0     No current facility-administered medications for this visit.       Review of Systems   Constitutional:  Negative for chills, fatigue, fever and unexpected weight change.   HENT:  Negative for hearing loss and trouble swallowing.    Eyes:  Negative for photophobia and visual disturbance.   Respiratory:  Negative for cough, shortness of breath and wheezing.    Cardiovascular:  Negative for chest pain, palpitations and leg swelling.   Gastrointestinal:  Negative for abdominal pain and nausea.   Genitourinary:  Negative for dysuria and frequency.   Musculoskeletal:  Positive for arthralgias, gait problem and joint  swelling. Negative for back pain and myalgias.   Skin:  Negative for rash and wound.   Neurological:  Negative for tremors, seizures, weakness, numbness and headaches.   Hematological:  Does not bruise/bleed easily.       Objective:        Post-op surgery of the left foot and ankle with normal healing, no signs of infection or dehiscence of wound. Normal post op exam today. No redness, no drainage, no increase in local temperature, no significant swelling, sutures.steri-strips are intact.     Imaging:  None    Physical Exam:   Foot Exam  Physical Exam       Assessment:       1. Achilles tendinosis of left lower extremity    2. Exostosis of left posterior calcaneus    3. Pain of joint of left ankle and foot    4. Chronic pain of left ankle      Plan:   Achilles tendinosis of left lower extremity    Exostosis of left posterior calcaneus    Pain of joint of left ankle and foot    Chronic pain of left ankle      Follow up in about 4 weeks (around 4/17/2023), or if symptoms worsen or fail to improve.    Procedures - Steri-Strips removed.  Surgical incision well healed.    The surgical dressing was removed showing no signs of infection, excess edema or malalignment. A new dry dressing was applied and patient was instructed to leave dressing intact until next visit or until instructed to remove.     Patient is okay to begin showering.  Also instructed her that she can remove the cam walker boot while seated and work on non resistance gentle range-of-motion exercises of the foot and ankle.  Ice as needed for pain and swelling.  Patient is still to remain nonweightbearing until following up in 4 weeks.    CAM walker boot with non-weight bearing  Ice to painful area, 15 minutes at a time  Ace-wrap to help with swelling  No barefoot   Keep affected extremity elevated while seated      This note was created using Dragon voice recognition software that occasionally misinterpreted phrases or words.

## 2023-03-23 ENCOUNTER — PATIENT MESSAGE (OUTPATIENT)
Dept: PODIATRY | Facility: CLINIC | Age: 53
End: 2023-03-23
Payer: COMMERCIAL

## 2023-03-24 ENCOUNTER — PATIENT MESSAGE (OUTPATIENT)
Dept: PODIATRY | Facility: CLINIC | Age: 53
End: 2023-03-24
Payer: COMMERCIAL

## 2023-04-17 ENCOUNTER — PATIENT MESSAGE (OUTPATIENT)
Dept: PODIATRY | Facility: CLINIC | Age: 53
End: 2023-04-17

## 2023-04-17 ENCOUNTER — OFFICE VISIT (OUTPATIENT)
Dept: PODIATRY | Facility: CLINIC | Age: 53
End: 2023-04-17
Payer: COMMERCIAL

## 2023-04-17 VITALS — OXYGEN SATURATION: 98 % | HEIGHT: 63 IN | HEART RATE: 120 BPM | BODY MASS INDEX: 34.02 KG/M2 | WEIGHT: 192 LBS

## 2023-04-17 DIAGNOSIS — M25.572 PAIN OF JOINT OF LEFT ANKLE AND FOOT: ICD-10-CM

## 2023-04-17 DIAGNOSIS — G89.29 CHRONIC PAIN OF LEFT ANKLE: ICD-10-CM

## 2023-04-17 DIAGNOSIS — M25.572 CHRONIC PAIN OF LEFT ANKLE: ICD-10-CM

## 2023-04-17 DIAGNOSIS — M67.88 ACHILLES TENDINOSIS OF LEFT LOWER EXTREMITY: Primary | ICD-10-CM

## 2023-04-17 DIAGNOSIS — M77.32 EXOSTOSIS OF LEFT POSTERIOR CALCANEUS: ICD-10-CM

## 2023-04-17 PROCEDURE — 3008F PR BODY MASS INDEX (BMI) DOCUMENTED: ICD-10-PCS | Mod: CPTII,S$GLB,, | Performed by: PODIATRIST

## 2023-04-17 PROCEDURE — 99024 PR POST-OP FOLLOW-UP VISIT: ICD-10-PCS | Mod: S$GLB,,, | Performed by: PODIATRIST

## 2023-04-17 PROCEDURE — 3008F BODY MASS INDEX DOCD: CPT | Mod: CPTII,S$GLB,, | Performed by: PODIATRIST

## 2023-04-17 PROCEDURE — 1160F RVW MEDS BY RX/DR IN RCRD: CPT | Mod: CPTII,S$GLB,, | Performed by: PODIATRIST

## 2023-04-17 PROCEDURE — 99024 POSTOP FOLLOW-UP VISIT: CPT | Mod: S$GLB,,, | Performed by: PODIATRIST

## 2023-04-17 PROCEDURE — 1159F PR MEDICATION LIST DOCUMENTED IN MEDICAL RECORD: ICD-10-PCS | Mod: CPTII,S$GLB,, | Performed by: PODIATRIST

## 2023-04-17 PROCEDURE — 1160F PR REVIEW ALL MEDS BY PRESCRIBER/CLIN PHARMACIST DOCUMENTED: ICD-10-PCS | Mod: CPTII,S$GLB,, | Performed by: PODIATRIST

## 2023-04-17 PROCEDURE — 1159F MED LIST DOCD IN RCRD: CPT | Mod: CPTII,S$GLB,, | Performed by: PODIATRIST

## 2023-04-17 NOTE — PROGRESS NOTES
"  [address] [facility] [address] [place] [address]  Phone: [phone]   [phone]    Patient's PCP:Orlando Mohan MD  Referring Provider:No ref. provider found    Subjective:      Chief Complaint: Post-op Evaluation (On 03/02/203 surgery  Left foot )        Date of Surgery: 03/02/2023  Procedure: Repair of Achilles tendon left 2.  Resection of calcaneal exostosis left    HPI:   Joselin Phillips is a 52 y.o. female who returns to the clinic today for her post-operative visit. Joselin Phlilips rates pain a 4/10 on a pain scale. Compliance of Care: Patient presents today in her boot.  States she has been working on range of motion exercises while seated as previously instructed.    Vitals:    04/17/23 1025   Pulse: (!) 120   SpO2: 98%   Weight: 87.1 kg (192 lb)   Height: 5' 3" (1.6 m)   PainSc:   4   PainLoc: Foot        Past Surgical History:   Procedure Laterality Date    COLONOSCOPY N/A 09/21/2021    Procedure: COLONOSCOPY;  Surgeon: Zach Hemphill MD;  Location: [facility];  Service: Endoscopy;  Laterality: N/A;    CYSTOSCOPY N/A 10/28/2021    Procedure: CYSTOSCOPY;  Surgeon: Merlin Milligan MD;  Location: [facility] OR;  Service: OB/GYN;  Laterality: N/A;    EXCISION,YNES DEFORMITY,CALCANEUS Left 3/2/2023    Procedure: EXCISION,YNES DEFORMITY,CALCANEUS;  Surgeon: Alexey Lauren DPM;  Location: [facility] OR;  Service: Podiatry;  Laterality: Left;    FOOT SURGERY Left 2017    REPAIR OF TENDON OF LOWER EXTREMITY Left 3/2/2023    Procedure: REPAIR, TENDON, LOWER EXTREMITY;  Surgeon: Alexey Lauren DPM;  Location: [facility] OR;  Service: Podiatry;  Laterality: Left;    RESECTION OF GASTROCNEMIUS MUSCLE Left 3/2/2023    Procedure: RESECTION, MUSCLE, GASTROCNEMIUS;  Surgeon: Alexey Lauren DPM;  Location: [facility] OR;  Service: Podiatry;  Laterality: Left;    ROBOT-ASSISTED LAPAROSCOPIC HYSTERECTOMY N/A 10/28/2021    Procedure: ROBOTIC HYSTERECTOMY;  Surgeon: Merlin Milligan MD;  Location: [facility] OR;  " Service: OB/GYN;  Laterality: N/A;    ROBOT-ASSISTED LAPAROSCOPIC SALPINGO-OOPHORECTOMY Bilateral 10/28/2021    Procedure: ROBOTIC SALPINGO-OOPHORECTOMY;  Surgeon: Merlin Milligan MD;  Location: [facility];  Service: OB/GYN;  Laterality: Bilateral;    TUBAL LIGATION Bilateral 10/29/2022     Past Medical History:   Diagnosis Date    Asthma     childhood-since resolved.    Foot pain     GERD (gastroesophageal reflux disease)     Ovarian mass     Pelvic mass     Pelvic pain     Plantar fasciitis      Family History   Problem Relation Age of Onset    Arthritis Mother     Heart disease Father     Hypertension Father     Kidney disease Father     Colon cancer Neg Hx     Breast cancer Neg Hx     Ovarian cancer Neg Hx         Social History:   Marital Status:   Alcohol History:  reports that she does not currently use alcohol after a past usage of about 1.0 standard drink per week.  Tobacco History:  reports that she has never smoked. She has never used smokeless tobacco.  Drug History:  reports no history of drug use.    Review of patient's allergies indicates:  No Known Allergies    Current Outpatient Medications   Medication Sig Dispense Refill    HYDROcodone-acetaminophen (NORCO)  mg per tablet Take 1 tablet by mouth every 6 (six) hours as needed for Pain. 30 tablet 0    naproxen (NAPROSYN) 250 MG tablet Take 250 mg by mouth 2 (two) times daily.      ondansetron (ZOFRAN-ODT) 4 MG TbDL Take 2 tablets (8 mg total) by mouth every 8 (eight) hours as needed (nausea). 30 tablet 0     No current facility-administered medications for this visit.       Review of Systems   Constitutional:  Negative for chills, fatigue, fever and unexpected weight change.   HENT:  Negative for hearing loss and trouble swallowing.    Eyes:  Negative for photophobia and visual disturbance.   Respiratory:  Negative for cough, shortness of breath and wheezing.    Cardiovascular:  Negative for chest pain, palpitations and leg swelling.    Gastrointestinal:  Negative for abdominal pain and nausea.   Genitourinary:  Negative for dysuria and frequency.   Musculoskeletal:  Negative for arthralgias, back pain, gait problem, joint swelling and myalgias.   Skin:  Negative for rash and wound.   Neurological:  Negative for tremors, seizures, weakness and headaches.   Hematological:  Does not bruise/bleed easily.       Objective:        Post-op surgery of the left foot and ankle with normal healing, no signs of infection or dehiscence of wound. Normal post op exam today. No redness, no drainage, no increase in local temperature, no significant swelling, surgical incision fully healed.  Muscle strength 4/5.  Minimal tenderness on palpation of the surgical site.  5° dorsiflexion with minimal pain.    Imaging: none     Physical Exam:   Foot Exam  Physical Exam       Assessment:       1. Achilles tendinosis of left lower extremity    2. Exostosis of left posterior calcaneus    3. Pain of joint of left ankle and foot    4. Chronic pain of left ankle      Plan:   Achilles tendinosis of left lower extremity    Exostosis of left posterior calcaneus    Pain of joint of left ankle and foot    Chronic pain of left ankle      Follow up in about 4 weeks (around 5/15/2023), or if symptoms worsen or fail to improve.    Procedures - None    Patient is progressing well postoperatively.  At this time she is okay for full weight-bearing in the Cam Walker boot.  Continue with icing as needed for pain and swelling.  Also instructed on working on strengthening and range of motion exercises at home.  I did discuss with her that I may refer her to physical therapy following her next examine in 4 weeks depending on the progress she makes over the next month.    This note was created using Dragon voice recognition software that occasionally misinterpreted phrases or words.

## 2023-04-19 ENCOUNTER — PATIENT MESSAGE (OUTPATIENT)
Dept: PODIATRY | Facility: CLINIC | Age: 53
End: 2023-04-19
Payer: COMMERCIAL

## 2023-04-21 ENCOUNTER — PATIENT MESSAGE (OUTPATIENT)
Dept: PODIATRY | Facility: CLINIC | Age: 53
End: 2023-04-21
Payer: COMMERCIAL

## 2023-04-24 ENCOUNTER — PATIENT MESSAGE (OUTPATIENT)
Dept: PODIATRY | Facility: CLINIC | Age: 53
End: 2023-04-24
Payer: COMMERCIAL

## 2023-05-01 ENCOUNTER — PATIENT MESSAGE (OUTPATIENT)
Dept: OBSTETRICS AND GYNECOLOGY | Facility: CLINIC | Age: 53
End: 2023-05-01
Payer: COMMERCIAL

## 2023-05-15 ENCOUNTER — OFFICE VISIT (OUTPATIENT)
Dept: PODIATRY | Facility: CLINIC | Age: 53
End: 2023-05-15
Payer: COMMERCIAL

## 2023-05-15 VITALS — HEIGHT: 63 IN | BODY MASS INDEX: 34.02 KG/M2 | WEIGHT: 192 LBS

## 2023-05-15 DIAGNOSIS — M77.32 EXOSTOSIS OF LEFT POSTERIOR CALCANEUS: ICD-10-CM

## 2023-05-15 DIAGNOSIS — G89.29 CHRONIC PAIN OF LEFT ANKLE: ICD-10-CM

## 2023-05-15 DIAGNOSIS — M67.88 ACHILLES TENDINOSIS OF LEFT LOWER EXTREMITY: Primary | ICD-10-CM

## 2023-05-15 DIAGNOSIS — M25.572 PAIN OF JOINT OF LEFT ANKLE AND FOOT: ICD-10-CM

## 2023-05-15 DIAGNOSIS — M25.572 CHRONIC PAIN OF LEFT ANKLE: ICD-10-CM

## 2023-05-15 PROCEDURE — 1160F PR REVIEW ALL MEDS BY PRESCRIBER/CLIN PHARMACIST DOCUMENTED: ICD-10-PCS | Mod: CPTII,S$GLB,, | Performed by: PODIATRIST

## 2023-05-15 PROCEDURE — 99024 POSTOP FOLLOW-UP VISIT: CPT | Mod: S$GLB,,, | Performed by: PODIATRIST

## 2023-05-15 PROCEDURE — 99999 PR PBB SHADOW E&M-EST. PATIENT-LVL III: ICD-10-PCS | Mod: PBBFAC,,, | Performed by: PODIATRIST

## 2023-05-15 PROCEDURE — 3008F BODY MASS INDEX DOCD: CPT | Mod: CPTII,S$GLB,, | Performed by: PODIATRIST

## 2023-05-15 PROCEDURE — 99024 PR POST-OP FOLLOW-UP VISIT: ICD-10-PCS | Mod: S$GLB,,, | Performed by: PODIATRIST

## 2023-05-15 PROCEDURE — 1159F MED LIST DOCD IN RCRD: CPT | Mod: CPTII,S$GLB,, | Performed by: PODIATRIST

## 2023-05-15 PROCEDURE — 1160F RVW MEDS BY RX/DR IN RCRD: CPT | Mod: CPTII,S$GLB,, | Performed by: PODIATRIST

## 2023-05-15 PROCEDURE — 1159F PR MEDICATION LIST DOCUMENTED IN MEDICAL RECORD: ICD-10-PCS | Mod: CPTII,S$GLB,, | Performed by: PODIATRIST

## 2023-05-15 PROCEDURE — 3008F PR BODY MASS INDEX (BMI) DOCUMENTED: ICD-10-PCS | Mod: CPTII,S$GLB,, | Performed by: PODIATRIST

## 2023-05-15 PROCEDURE — 99999 PR PBB SHADOW E&M-EST. PATIENT-LVL III: CPT | Mod: PBBFAC,,, | Performed by: PODIATRIST

## 2023-05-15 RX ORDER — TRAMADOL HYDROCHLORIDE 50 MG/1
50 TABLET ORAL EVERY 6 HOURS PRN
Qty: 28 TABLET | Refills: 0 | Status: SHIPPED | OUTPATIENT
Start: 2023-05-15 | End: 2024-02-15

## 2023-05-15 NOTE — PROGRESS NOTES
"  [address] [facility] [place] [place] [address]  Phone: [phone]   [phone]    Patient's PCP:Orlando Mohan MD  Referring Provider:No ref. provider found    Subjective:      Chief Complaint: Post-op Evaluation    Date of Surgery: 03/02/2023  Procedure: Repair of Achilles tendon left 2.  Resection of calcaneal exostosis left    HPI:   Joselin Phillips is a 52 y.o. female who returns to the clinic today for her post-operative visit. Joselin Phillips rates pain a 7/10 on a pain scale. Compliance of Care:  boot cast.  C/o swelling and increased pain lately as she has been increasing her activities.      Vitals:    05/15/23 1038   Weight: 87.1 kg (192 lb)   Height: 5' 3" (1.6 m)   PainSc:   7        Past Surgical History:   Procedure Laterality Date    COLONOSCOPY N/A 09/21/2021    Procedure: COLONOSCOPY;  Surgeon: Zach Hemphill MD;  Location: [facility];  Service: Endoscopy;  Laterality: N/A;    CYSTOSCOPY N/A 10/28/2021    Procedure: CYSTOSCOPY;  Surgeon: Merlin Milligan MD;  Location: [facility] OR;  Service: OB/GYN;  Laterality: N/A;    EXCISION,YNES DEFORMITY,CALCANEUS Left 3/2/2023    Procedure: EXCISION,YNES DEFORMITY,CALCANEUS;  Surgeon: Alexey Lauren DPM;  Location: [facility] OR;  Service: Podiatry;  Laterality: Left;    FOOT SURGERY Left 2017    REPAIR OF TENDON OF LOWER EXTREMITY Left 3/2/2023    Procedure: REPAIR, TENDON, LOWER EXTREMITY;  Surgeon: Alexey Lauren DPM;  Location: [facility] OR;  Service: Podiatry;  Laterality: Left;    RESECTION OF GASTROCNEMIUS MUSCLE Left 3/2/2023    Procedure: RESECTION, MUSCLE, GASTROCNEMIUS;  Surgeon: Alexey Lauren DPM;  Location: [facility] OR;  Service: Podiatry;  Laterality: Left;    ROBOT-ASSISTED LAPAROSCOPIC HYSTERECTOMY N/A 10/28/2021    Procedure: ROBOTIC HYSTERECTOMY;  Surgeon: Merlin Milligan MD;  Location: [facility];  Service: OB/GYN;  Laterality: N/A;    ROBOT-ASSISTED LAPAROSCOPIC SALPINGO-OOPHORECTOMY Bilateral 10/28/2021    Procedure: " ROBOTIC SALPINGO-OOPHORECTOMY;  Surgeon: Merlin Milligan MD;  Location: [facility];  Service: OB/GYN;  Laterality: Bilateral;    TUBAL LIGATION Bilateral 10/29/2022     Past Medical History:   Diagnosis Date    Asthma     childhood-since resolved.    Foot pain     GERD (gastroesophageal reflux disease)     Ovarian mass     Pelvic mass     Pelvic pain     Plantar fasciitis      Family History   Problem Relation Age of Onset    Arthritis Mother     Heart disease Father     Hypertension Father     Kidney disease Father     Colon cancer Neg Hx     Breast cancer Neg Hx     Ovarian cancer Neg Hx         Social History:   Marital Status:   Alcohol History:  reports that she does not currently use alcohol after a past usage of about 1.0 standard drink per week.  Tobacco History:  reports that she has never smoked. She has never used smokeless tobacco.  Drug History:  reports no history of drug use.    Review of patient's allergies indicates:  No Known Allergies    Current Outpatient Medications   Medication Sig Dispense Refill    HYDROcodone-acetaminophen (NORCO)  mg per tablet Take 1 tablet by mouth every 6 (six) hours as needed for Pain. 30 tablet 0    naproxen (NAPROSYN) 250 MG tablet Take 250 mg by mouth 2 (two) times daily.      ondansetron (ZOFRAN-ODT) 4 MG TbDL Take 2 tablets (8 mg total) by mouth every 8 (eight) hours as needed (nausea). 30 tablet 0    traMADoL (ULTRAM) 50 mg tablet Take 1 tablet (50 mg total) by mouth every 6 (six) hours as needed for Pain. 28 tablet 0     No current facility-administered medications for this visit.       Review of Systems   Constitutional:  Negative for chills, fatigue, fever and unexpected weight change.   HENT:  Negative for hearing loss and trouble swallowing.    Eyes:  Negative for photophobia and visual disturbance.   Respiratory:  Negative for cough, shortness of breath and wheezing.    Cardiovascular:  Negative for chest pain, palpitations and leg swelling.    Gastrointestinal:  Negative for abdominal pain and nausea.   Genitourinary:  Negative for dysuria and frequency.   Musculoskeletal:  Positive for joint swelling. Negative for arthralgias, back pain, gait problem and myalgias.   Skin:  Negative for rash and wound.   Neurological:  Negative for tremors, seizures, weakness and headaches.   Hematological:  Does not bruise/bleed easily.       Objective:        Post-op surgery of the left foot and ankle with normal healing, no signs of infection or dehiscence of wound. Normal post op exam today. No redness, no drainage, no increase in local temperature, no significant swelling, surgical incision fully healed.  Muscle strength +4/5.  Dorsiflexion above neutral with minimal pain.    Imaging:  None    Physical Exam:   Foot Exam  Physical Exam       Assessment:       1. Achilles tendinosis of left lower extremity    2. Pain of joint of left ankle and foot    3. Exostosis of left posterior calcaneus    4. Chronic pain of left ankle      Plan:   Achilles tendinosis of left lower extremity  -     Ambulatory referral/consult to Physical/Occupational Therapy; Future; Expected date: 05/22/2023  -     traMADoL (ULTRAM) 50 mg tablet; Take 1 tablet (50 mg total) by mouth every 6 (six) hours as needed for Pain.  Dispense: 28 tablet; Refill: 0    Pain of joint of left ankle and foot  -     Ambulatory referral/consult to Physical/Occupational Therapy; Future; Expected date: 05/22/2023  -     traMADoL (ULTRAM) 50 mg tablet; Take 1 tablet (50 mg total) by mouth every 6 (six) hours as needed for Pain.  Dispense: 28 tablet; Refill: 0    Exostosis of left posterior calcaneus    Chronic pain of left ankle      Follow up in about 4 weeks (around 6/12/2023), or if symptoms worsen or fail to improve.    Procedures - None    I discussed with the patient that overall she is progressing well postoperatively.  At this time I am going to refer her to physical therapy to work on strength and range of  motion.  She is to continue stretching and icing at home as instructed.  I also encouraged her to work on increasing her activities as her symptoms allow.  I discussed there that if over the next several weeks her foot is significantly improved she can try transitioning out of the cam walker boot into normal shoe gear.    This note was created using Dragon voice recognition software that occasionally misinterpreted phrases or words.

## 2023-05-17 ENCOUNTER — PATIENT MESSAGE (OUTPATIENT)
Dept: PODIATRY | Facility: CLINIC | Age: 53
End: 2023-05-17
Payer: COMMERCIAL

## 2023-05-23 ENCOUNTER — PATIENT MESSAGE (OUTPATIENT)
Dept: PODIATRY | Facility: CLINIC | Age: 53
End: 2023-05-23
Payer: COMMERCIAL

## 2023-05-25 ENCOUNTER — PATIENT MESSAGE (OUTPATIENT)
Dept: PODIATRY | Facility: CLINIC | Age: 53
End: 2023-05-25
Payer: COMMERCIAL

## 2023-05-26 ENCOUNTER — PATIENT MESSAGE (OUTPATIENT)
Dept: PODIATRY | Facility: CLINIC | Age: 53
End: 2023-05-26
Payer: COMMERCIAL

## 2023-05-30 ENCOUNTER — PATIENT MESSAGE (OUTPATIENT)
Dept: PODIATRY | Facility: CLINIC | Age: 53
End: 2023-05-30
Payer: COMMERCIAL

## 2023-06-05 NOTE — TELEPHONE ENCOUNTER
Spoke with patient to let her know I still do not have the paperwork. Patient states she will bring a copy to the office tomorrow.

## 2023-06-08 ENCOUNTER — PATIENT MESSAGE (OUTPATIENT)
Dept: PODIATRY | Facility: CLINIC | Age: 53
End: 2023-06-08
Payer: COMMERCIAL

## 2023-06-09 ENCOUNTER — CLINICAL SUPPORT (OUTPATIENT)
Dept: REHABILITATION | Facility: HOSPITAL | Age: 53
End: 2023-06-09
Payer: COMMERCIAL

## 2023-06-09 DIAGNOSIS — Z74.09 IMPAIRED FUNCTIONAL MOBILITY, BALANCE, GAIT, AND ENDURANCE: ICD-10-CM

## 2023-06-09 DIAGNOSIS — M25.672 DECREASED RANGE OF MOTION OF LEFT ANKLE: ICD-10-CM

## 2023-06-09 PROBLEM — M53.86 DECREASED ROM OF LUMBAR SPINE: Status: RESOLVED | Noted: 2019-06-03 | Resolved: 2023-06-09

## 2023-06-09 PROCEDURE — 97161 PT EVAL LOW COMPLEX 20 MIN: CPT | Mod: PN

## 2023-06-09 PROCEDURE — 97110 THERAPEUTIC EXERCISES: CPT | Mod: PN

## 2023-06-09 NOTE — PLAN OF CARE
[facility]  Physical Therapy Initial Evaluation    Name: Joselin Phillips  Clinic Number: [id number]    Therapy Diagnosis:   Encounter Diagnoses   Name Primary?    Decreased range of motion of left ankle     Impaired functional mobility, balance, gait, and endurance      Physician: Alexey Lauren DPM    Physician Orders: PT Eval and Treat   Medical Diagnosis from Referral:   M67.88 (ICD-10-CM) - Achilles tendinosis of left lower extremity   M25.572 (ICD-10-CM) - Pain of joint of left ankle and foot   Evaluation Date: 6/9/2023  Authorization Period Expiration: 5/14/2024  Plan of Care Expiration: 9/15/2023 or 20v post eval  Visit # (episodes) / Visits authorized: 1/ eval   (POC 0 / 20)   2nd FOTO visit 5  3rd FOTO visit 10  Progress Note Due: 7/9/2023      Time In: 900  Time Out: 940  Total Billable Time: 40 minutes    Precautions: CAM boot in community but released to transition into shoe; LBP hx  Insurance: Payor: [employer] / Plan: [employer] ([employer]) / Product Type: Commercial /     Subjective   Date of onset: s/p 3/2/2023  History of current condition - Thalia reports: she had a bone spur removed about 7 years ago in the same foot. No complications with current surgery. Was told by surgeon: No running, jumping; use boot if leave house. Pt reports taking off boot at home. Shoe is currently uncomfortable to get into b/c it rubs on incision. Pain mostly with getting up in the morning. Incision bothering her at certain angles while sleeping.     Medical History:   Past Medical History:   Diagnosis Date    Asthma     childhood-since resolved.    Foot pain     GERD (gastroesophageal reflux disease)     Ovarian mass     Pelvic mass     Pelvic pain     Plantar fasciitis        Surgical History:   Joselin Phillips  has a past surgical history that includes Tubal ligation (Bilateral, 10/29/2022); Foot surgery (Left, 2017); Colonoscopy (N/A, 09/21/2021);  Robot-assisted laparoscopic hysterectomy (N/A, 10/28/2021); Robot-assisted laparoscopic salpingo-oophorectomy (Bilateral, 10/28/2021); Cystoscopy (N/A, 10/28/2021); Repair of tendon of lower extremity (Left, 3/2/2023); excision,tracie deformity,calcaneus (Left, 3/2/2023); and Resection of gastrocnemius muscle (Left, 3/2/2023).    Medications:   Joselin has a current medication list which includes the following prescription(s): hydrocodone-acetaminophen, naproxen, ondansetron, and tramadol.    Allergies:   Review of patient's allergies indicates:  No Known Allergies     Imaging, see in EPIC    Prior Therapy: yes  Social History: lives with ; family in area; 1-story home  Current Smoker: no  Cancer Hx: no  Occupation: FT  @ [employer] / on medical leave, TBD by MD, estimate 7/10/23  Prior Level of Function: CAM boot prior to sx  Current Level of Function: difficult/pain with chores; work duties; prolong standing - walking    Pain:  Current 3/10, worst 9/10, best 2/10   Location: Left achilles (@ incision)  Description: sharp; aching; posterior numbness in incision area  Aggravating Factors: chores; work duties; prolong standing - walking  Easing Factors: elevate, rest, ice, tylenol, pn med    Pts goals: get it right; return to plof    Objective     Posture Observation: Arches / GV: high BMI; toe extension; Right IC higher; normal lordosis; Anterior Pelvic Tilt; moderate kyphosis and FHP; Upper Trapezius dominance  Palpation: Incision healing well; normal edema warmth post sx    Range of Motion/Strength:      Ankle ROM: Left Right   Ankle Dorsiflexion (20 norm) 10°  20°   Ankle Plantarflexion   (50 norm) 53° 61°     Ankle Inversion (35 norm) 19° 22°   Ankle Eversion (25 norm) 19° 22°       Ankle MMT: 5/5 norm Left Right   Ankle Dorsiflexion 2/5 5/5   Ankle Plantarflexion NT 4/5   Ankle Inversion 4/5 5/5   Ankle Eversion 4/5 5/5   Knee MMT: Left Extension 3+ / Left Flexion 3+  Hip MMT: Left Hip  Flexion 3+ / Abd Bilateral 4 / Add Bilateral 4       Girth Measurements Left Right   Figure 8 52.8 cm 50.5 cm      Gait With AD.  Device Used -  CAM boot   Analysis None; pt did not have shoes with her       Other:           TREATMENT   Treatment Time In: 920  Treatment Time Out: 940  Total Treatment time separate from Evaluation: 20 minutes    Thalia received therapeutic exercises to develop strength, endurance, ROM, and flexibility for 15 minutes including:  NMRE utilized to activate appropriate mm to improve balance, proprioception, stability and gait skills: 5 minutes     DOS: 3/2/2023  REPAIR, TENDON, LOWER EXTREMITY Left    EXCISION,YNES DEFORMITY,CALCANEUS Left    RESECTION, MUSCLE, GASTROCNEMIUS Left    Surgeon: Able to transition into shoe at this point; supposed to bring tennis shoe  [employer] employee      SEATED: with footstool  Ankle circles, 20x ea direction  Ankle alphabet, 1 set  Ankle 4-way AROM, 30x ea, stabilize leg (add Yellow Theraband or Red Theraband)  Seated HR/TR, 30x ea    STAND:  Med-Lat weight shift, 1 min  A-P weight shift, 1 min        Add NEXT visit:  Gastroc strap stretch, 3x30s  Arch lifts, 20x5s  Toe ext rolls, 5-4-3-2-1, 20x  DF Slideboard, 10x10s  Wobble board, A-P, M-L, Circles, 20x ea  Pre-Gait train: 1 min each (airex optional) - NO BOOT  1. M-L weight shift   2. Stagger: Affected fleg in front: Heel contact -> Flat foot with KE (mid stance)  3. Stagger: Affected leg in back: Roll through -> push off   Manual tx; edema massage; scar mob  FUTURE visit:  Squats  Stand HR / Toe Raises  Soleus Heel Raises   INV/EV 1/2 foam  FSU / FSD  Single heel raises  Toe taps->March  SLB  Ecc HR  Walking  See knee and hip MMT for strengthening Left Lower Extremity   Home Exercises and Patient Education Provided    Education provided:   - Ankle pumps with foot elevated above heart, 5 minutes (HEP)  - ice as needed; 20 min max    Written Home Exercises Provided: yes.  Exercises were reviewed and  Thalia was able to demonstrate them prior to the end of the session.  Thalia demonstrated good  understanding of the education provided.     See EMR under Patient Instructions for exercises provided 6/9/2023.    Assessment   Joselin is a 52 y.o. female referred to outpatient Physical Therapy with a medical diagnosis of s/p left achilles tendon repair. Pt presents with pain in left achilles incision region, decreased left ankle Range of Motion, decreased left ankle, knee and hip strength, and balance, posture and gait deficits. These deficits contribute to patient pain and dysfunction.     Pt prognosis is Good.   Pt will benefit from skilled outpatient Physical Therapy to address the deficits stated above and in the chart below, provide pt/family education, and to maximize pt's level of independence.     Plan of care discussed with patient: Yes  Pt's spiritual, cultural and educational needs considered and patient is agreeable to the plan of care and goals as stated below:     Anticipated Barriers for therapy: chronicity of condition    Medical Necessity is demonstrated by the following  History  Co-morbidities and personal factors that may impact the plan of care Co-morbidities:   GERD; pelvic and LB pain    Personal Factors:   lifestyle     low   Examination  Body Structures and Functions, activity limitations and participation restrictions that may impact the plan of care Body Regions:   lower extremities    Body Systems:    gross symmetry  ROM  strength  balance  gait  motor control  motor learning  posture    Participation Restrictions:   Chores, work, community    Activity limitations:   Learning and applying knowledge  no deficits    General Tasks and Commands  no deficits    Communication  no deficits    Mobility  lifting and carrying objects  walking    Self care  no deficits    Domestic Life  shopping  cooking  doing house work (cleaning house, washing dishes, laundry)  assisting  others    Interactions/Relationships  no deficits    Life Areas  employment    Community and Social Life  community life  recreation and leisure         low   Clinical Presentation stable and uncomplicated low   Decision Making/ Complexity Score: low     Goals:  Short Term Goals: 5 weeks   1. Pt will demonstrate compliance with HEP.  2. Pt will decrease swelling of ankle to 50.5 cm to reduce pain performing daily life functions.  3. Pt will increase DF ROM by 10 degrees to improve gait.      Long Term Goals: 10 weeks   1. Pt will demonstrate independence with HEP.  2. Pt will increase left ankle, knee and hip strength to 1/2 grade in order to increase WB tolerance.  3. Pt will improve FOTO limitation score to </= 27% to show improvement in condition and functional mobility.   4. Pt will be able to return to work with 1/10 pain levels and minimal edema.    Plan   Plan of care Certification: 6/9/2023 to 9/15/2023.    Outpatient Physical Therapy 2 times weekly for 10 weeks to include the following interventions: Electrical Stimulation ,, Gait Training, Manual Therapy, Moist Heat/ Ice, Neuromuscular Re-ed, Orthotic Management and Training, Patient Education, Self Care, Therapeutic Activities, Therapeutic Exercise, and Ultrasound.     Tish Sumner, PT

## 2023-06-09 NOTE — PROGRESS NOTES
[facility]  Physical Therapy Initial Evaluation    Name: Joselin Phillips  Clinic Number: [id number]    Therapy Diagnosis:   Encounter Diagnoses   Name Primary?    Decreased range of motion of left ankle     Impaired functional mobility, balance, gait, and endurance      Physician: Alexey Lauren DPM    Physician Orders: PT Eval and Treat   Medical Diagnosis from Referral:   M67.88 (ICD-10-CM) - Achilles tendinosis of left lower extremity   M25.572 (ICD-10-CM) - Pain of joint of left ankle and foot   Evaluation Date: 6/9/2023  Authorization Period Expiration: 5/14/2024  Plan of Care Expiration: 9/15/2023 or 20v post eval  Visit # (episodes) / Visits authorized: 1/ eval   (POC 0 / 20)   2nd FOTO visit 5  3rd FOTO visit 10  Progress Note Due: 7/9/2023      Time In: 900  Time Out: 940  Total Billable Time: 40 minutes    Precautions: CAM boot in community but released to transition into shoe; LBP hx  Insurance: Payor: [employer] / Plan: [employer] ([facility]) / Product Type: Commercial /     Subjective   Date of onset: s/p 3/2/2023  History of current condition - Thalia reports: she had a bone spur removed about 7 years ago in the same foot. No complications with current surgery. Was told by surgeon: No running, jumping; use boot if leave house. Pt reports taking off boot at home. Shoe is currently uncomfortable to get into b/c it rubs on incision. Pain mostly with getting up in the morning. Incision bothering her at certain angles while sleeping.     Medical History:   Past Medical History:   Diagnosis Date    Asthma     childhood-since resolved.    Foot pain     GERD (gastroesophageal reflux disease)     Ovarian mass     Pelvic mass     Pelvic pain     Plantar fasciitis        Surgical History:   Joselin Phillips  has a past surgical history that includes Tubal ligation (Bilateral, 10/29/2022); Foot surgery (Left, 2017); Colonoscopy (N/A, 09/21/2021);  Robot-assisted laparoscopic hysterectomy (N/A, 10/28/2021); Robot-assisted laparoscopic salpingo-oophorectomy (Bilateral, 10/28/2021); Cystoscopy (N/A, 10/28/2021); Repair of tendon of lower extremity (Left, 3/2/2023); excision,tracie deformity,calcaneus (Left, 3/2/2023); and Resection of gastrocnemius muscle (Left, 3/2/2023).    Medications:   Joselin has a current medication list which includes the following prescription(s): hydrocodone-acetaminophen, naproxen, ondansetron, and tramadol.    Allergies:   Review of patient's allergies indicates:  No Known Allergies     Imaging, see in EPIC    Prior Therapy: yes  Social History: lives with ; family in area; 1-story home  Current Smoker: no  Cancer Hx: no  Occupation: FT  @ [employer] / on medical leave, TBD by MD, estimate 7/10/23  Prior Level of Function: CAM boot prior to sx  Current Level of Function: difficult/pain with chores; work duties; prolong standing - walking    Pain:  Current 3/10, worst 9/10, best 2/10   Location: Left achilles (@ incision)  Description: sharp; aching; posterior numbness in incision area  Aggravating Factors: chores; work duties; prolong standing - walking  Easing Factors: elevate, rest, ice, tylenol, pn med    Pts goals: get it right; return to plof    Objective     Posture Observation: Arches / GV: high BMI; toe extension; Right IC higher; normal lordosis; Anterior Pelvic Tilt; moderate kyphosis and FHP; Upper Trapezius dominance  Palpation: Incision healing well; normal edema warmth post sx    Range of Motion/Strength:      Ankle ROM: Left Right   Ankle Dorsiflexion (20 norm) 10°  20°   Ankle Plantarflexion   (50 norm) 53° 61°     Ankle Inversion (35 norm) 19° 22°   Ankle Eversion (25 norm) 19° 22°       Ankle MMT: 5/5 norm Left Right   Ankle Dorsiflexion 2/5 5/5   Ankle Plantarflexion NT 4/5   Ankle Inversion 4/5 5/5   Ankle Eversion 4/5 5/5   Knee MMT: Left Extension 3+ / Left Flexion 3+  Hip MMT: Left Hip  Flexion 3+ / Abd Bilateral 4 / Add Bilateral 4       Girth Measurements Left Right   Figure 8 52.8 cm 50.5 cm      Gait With AD.  Device Used -  CAM boot   Analysis None; pt did not have shoes with her       Other:           TREATMENT   Treatment Time In: 920  Treatment Time Out: 940  Total Treatment time separate from Evaluation: 20 minutes    Thalia received therapeutic exercises to develop strength, endurance, ROM, and flexibility for 15 minutes including:  NMRE utilized to activate appropriate mm to improve balance, proprioception, stability and gait skills: 5 minutes     DOS: 3/2/2023  REPAIR, TENDON, LOWER EXTREMITY Left    EXCISION,YNES DEFORMITY,CALCANEUS Left    RESECTION, MUSCLE, GASTROCNEMIUS Left    Surgeon: Able to transition into shoe at this point; supposed to bring tennis shoe  [employer] employee      SEATED: with footstool  Ankle circles, 20x ea direction  Ankle alphabet, 1 set  Ankle 4-way AROM, 30x ea, stabilize leg (add Yellow Theraband or Red Theraband)  Seated HR/TR, 30x ea    STAND:  Med-Lat weight shift, 1 min  A-P weight shift, 1 min        Add NEXT visit:  Gastroc strap stretch, 3x30s  Arch lifts, 20x5s  Toe ext rolls, 5-4-3-2-1, 20x  DF Slideboard, 10x10s  Wobble board, A-P, M-L, Circles, 20x ea  Pre-Gait train: 1 min each (airex optional) - NO BOOT  1. M-L weight shift   2. Stagger: Affected fleg in front: Heel contact -> Flat foot with KE (mid stance)  3. Stagger: Affected leg in back: Roll through -> push off   Manual tx; edema massage; scar mob  FUTURE visit:  Squats  Stand HR / Toe Raises  Soleus Heel Raises   INV/EV 1/2 foam  FSU / FSD  Single heel raises  Toe taps->March  SLB  Ecc HR  Walking  See knee and hip MMT for strengthening Left Lower Extremity   Home Exercises and Patient Education Provided    Education provided:   - Ankle pumps with foot elevated above heart, 5 minutes (HEP)  - ice as needed; 20 min max    Written Home Exercises Provided: yes.  Exercises were reviewed and  Thalia was able to demonstrate them prior to the end of the session.  Thalia demonstrated good  understanding of the education provided.     See EMR under Patient Instructions for exercises provided 6/9/2023.    Assessment   Joselin is a 52 y.o. female referred to outpatient Physical Therapy with a medical diagnosis of s/p left achilles tendon repair. Pt presents with pain in left achilles incision region, decreased left ankle Range of Motion, decreased left ankle, knee and hip strength, and balance, posture and gait deficits. These deficits contribute to patient pain and dysfunction.     Pt prognosis is Good.   Pt will benefit from skilled outpatient Physical Therapy to address the deficits stated above and in the chart below, provide pt/family education, and to maximize pt's level of independence.     Plan of care discussed with patient: Yes  Pt's spiritual, cultural and educational needs considered and patient is agreeable to the plan of care and goals as stated below:     Anticipated Barriers for therapy: chronicity of condition    Medical Necessity is demonstrated by the following  History  Co-morbidities and personal factors that may impact the plan of care Co-morbidities:   GERD; pelvic and LB pain    Personal Factors:   lifestyle     low   Examination  Body Structures and Functions, activity limitations and participation restrictions that may impact the plan of care Body Regions:   lower extremities    Body Systems:    gross symmetry  ROM  strength  balance  gait  motor control  motor learning  posture    Participation Restrictions:   Chores, work, community    Activity limitations:   Learning and applying knowledge  no deficits    General Tasks and Commands  no deficits    Communication  no deficits    Mobility  lifting and carrying objects  walking    Self care  no deficits    Domestic Life  shopping  cooking  doing house work (cleaning house, washing dishes, laundry)  assisting  others    Interactions/Relationships  no deficits    Life Areas  employment    Community and Social Life  community life  recreation and leisure         low   Clinical Presentation stable and uncomplicated low   Decision Making/ Complexity Score: low     Goals:  Short Term Goals: 5 weeks   1. Pt will demonstrate compliance with HEP.  2. Pt will decrease swelling of ankle to 50.5 cm to reduce pain performing daily life functions.  3. Pt will increase DF ROM by 10 degrees to improve gait.      Long Term Goals: 10 weeks   1. Pt will demonstrate independence with HEP.  2. Pt will increase left ankle, knee and hip strength to 1/2 grade in order to increase WB tolerance.  3. Pt will improve FOTO limitation score to </= 27% to show improvement in condition and functional mobility.   4. Pt will be able to return to work with 1/10 pain levels and minimal edema.    Plan   Plan of care Certification: 6/9/2023 to 9/15/2023.    Outpatient Physical Therapy 2 times weekly for 10 weeks to include the following interventions: Electrical Stimulation ,, Gait Training, Manual Therapy, Moist Heat/ Ice, Neuromuscular Re-ed, Orthotic Management and Training, Patient Education, Self Care, Therapeutic Activities, Therapeutic Exercise, and Ultrasound.     Tish Sumner, PT

## 2023-06-13 ENCOUNTER — CLINICAL SUPPORT (OUTPATIENT)
Dept: REHABILITATION | Facility: HOSPITAL | Age: 53
End: 2023-06-13
Payer: COMMERCIAL

## 2023-06-13 DIAGNOSIS — Z74.09 IMPAIRED FUNCTIONAL MOBILITY, BALANCE, GAIT, AND ENDURANCE: ICD-10-CM

## 2023-06-13 DIAGNOSIS — M25.672 DECREASED RANGE OF MOTION OF LEFT ANKLE: Primary | ICD-10-CM

## 2023-06-13 PROCEDURE — 97110 THERAPEUTIC EXERCISES: CPT | Mod: PN

## 2023-06-13 PROCEDURE — 97140 MANUAL THERAPY 1/> REGIONS: CPT | Mod: PN

## 2023-06-13 PROCEDURE — 97112 NEUROMUSCULAR REEDUCATION: CPT | Mod: PN

## 2023-06-13 NOTE — PROGRESS NOTES
[facility]   Physical Therapy Treatment Note      Name: Joselin Phillips  Clinic Number: [id number]    Therapy Diagnosis:   Encounter Diagnoses   Name Primary?    Decreased range of motion of left ankle Yes    Impaired functional mobility, balance, gait, and endurance      Physician: Alexey Lauren DPM    Visit Date: 6/13/2023    Physician Orders: PT Eval and Treat   Medical Diagnosis from Referral:   M67.88 (ICD-10-CM) - Achilles tendinosis of left lower extremity   M25.572 (ICD-10-CM) - Pain of joint of left ankle and foot   Evaluation Date: 6/9/2023  Authorization Period Expiration: 5/14/2024  Plan of Care Expiration: 9/15/2023 or 20v post eval  Visit # (episodes) / Visits authorized:  2 / eval + 20  (POC 1 / 20)   2nd FOTO visit 5  3rd FOTO visit 20  Progress Note Due: 7/9/2023        Time In: 900  Time Out: 955  Total Billable Time: 55 minutes     Precautions: CAM boot in community but released to transition into shoe; LBP hx  Insurance: Payor: [employer] / Plan: [id number] ([facility]) / Product Type: Commercial /     Subjective     Pt reports: exercises going well. Pain is low and brought her shoes (arrived without boot). Reports using boot out in community; not at home anymore.    She was compliant with home exercise program.  Response to previous treatment: none  Functional change: none reported    Pain: 2/10  Location: Left achilles; incision region    Objective      Objective Measures updated at progress report unless specified.     Treatment     DOS: 3/2/2023  REPAIR, TENDON, LOWER EXTREMITY Left     EXCISION,YNES DEFORMITY,CALCANEUS Left     RESECTION, MUSCLE, GASTROCNEMIUS Left     Surgeon: Able to transition into shoe at this point; supposed to bring tennis shoe  [employer] employee    Thalia received the treatments listed below:      Thalia received therapeutic exercises to develop strength, endurance, ROM, and flexibility for 30 minutes  including:  NMRE utilized to activate appropriate mm to improve balance, proprioception, stability and gait skills: 12 minutes      SEATED: with footstool  Ankle circles, 20x ea direction  Ankle alphabet, 1 set  Gastroc strap stretch, 3x30s  Red Theraband Ankle 4-way AROM, 30x ea, stabilize leg  Seated HR/TR, 30x ea (remove when progress to stand)  Arch lifts, 30x5s  Toe ext rolls, 5-4-3-2-1, 30x  DF Slideboard, 10x10s  Tennis Ball Massage of medial arch, 2 minutes    Wobble board, A-P, M-L, Circles, 20x ea     STAND:  Pre-Gait train: 1 min each (airex optional) - NO BOOT  1. March with even weight distribution and no leaning   2. Stagger: Affected fleg in front: Heel contact -> Flat foot with KE (mid stance)  3. Stagger: Affected leg in back: Roll through -> push off   4. Ambulate Hallway, MIRROR       Manual tx: 8 minutes  Scar Mobilization: parallel and cross friction; smoothing; lifting  Myofascial Release with ART of gastroc         Add NEXT visit:  Standing Gastroc stretch  FUTURE visit:  Squats  Stand HR / Toe Raises  INV/EV 1/2 foam  Soleus Heel Raises   FSU / FSD  Single heel raises  SLB  Ecc HR  Walking  Red Theraband lateral steps  Red Theraband Abduction       Patient Education and Home Exercises       Education provided:   - daily HEP  - added Red Theraband to ankle AROM    Written Home Exercises Provided: Patient instructed to cont prior HEP. Exercises were reviewed and Thalia was able to demonstrate them prior to the end of the session.  Thalia demonstrated good  understanding of the education provided. See EMR under Patient Instructions for exercises provided during therapy sessions    Assessment     Pt demonstrates low pain levels post start of rehabilitation. Added resistance to ankle strengthening; minimal pn provocation. Utilized manual tx to smooth incision, break up scar tissue and myofascial restrictions; sensitive to touch in achilles region. Pt able to carry over pre-gait training;  hypothesize pt had Bilateral trendelenburg prior to this surgery. Will need additional hip strengthening to reduce gait deficits.    Thalia Is progressing well towards her goals.   Pt prognosis is Good.     Pt will continue to benefit from skilled outpatient physical therapy to address the deficits listed in the problem list box on initial evaluation, provide pt/family education and to maximize pt's level of independence in the home and community environment.     Pt's spiritual, cultural and educational needs considered and pt agreeable to plan of care and goals.     Anticipated barriers to physical therapy: chronicity of condition    Goals:   Short Term Goals: 5 weeks   1. Pt will demonstrate compliance with HEP.  2. Pt will decrease swelling of ankle to 50.5 cm to reduce pain performing daily life functions.  3. Pt will increase DF ROM by 10 degrees to improve gait.      Long Term Goals: 10 weeks   1. Pt will demonstrate independence with HEP.  2. Pt will increase left ankle, knee and hip strength to 1/2 grade in order to increase WB tolerance.  3. Pt will improve FOTO limitation score to </= 27% to show improvement in condition and functional mobility.   4. Pt will be able to return to work with 1/10 pain levels and minimal edema.    Plan     PT as deemed per POC: ankle Range of Motion, strengthening, gait training    Tish Sumner, PT

## 2023-06-15 ENCOUNTER — DOCUMENTATION ONLY (OUTPATIENT)
Dept: REHABILITATION | Facility: HOSPITAL | Age: 53
End: 2023-06-15

## 2023-06-15 ENCOUNTER — CLINICAL SUPPORT (OUTPATIENT)
Dept: REHABILITATION | Facility: HOSPITAL | Age: 53
End: 2023-06-15
Payer: COMMERCIAL

## 2023-06-15 ENCOUNTER — PATIENT MESSAGE (OUTPATIENT)
Dept: PODIATRY | Facility: CLINIC | Age: 53
End: 2023-06-15
Payer: COMMERCIAL

## 2023-06-15 DIAGNOSIS — M25.672 DECREASED RANGE OF MOTION OF LEFT ANKLE: Primary | ICD-10-CM

## 2023-06-15 DIAGNOSIS — Z74.09 IMPAIRED FUNCTIONAL MOBILITY, BALANCE, GAIT, AND ENDURANCE: ICD-10-CM

## 2023-06-15 PROCEDURE — 97530 THERAPEUTIC ACTIVITIES: CPT | Mod: PN,CQ

## 2023-06-15 PROCEDURE — 97110 THERAPEUTIC EXERCISES: CPT | Mod: PN,CQ

## 2023-06-15 PROCEDURE — 97112 NEUROMUSCULAR REEDUCATION: CPT | Mod: PN,CQ

## 2023-06-15 NOTE — PROGRESS NOTES
[facility]   Physical Therapy Treatment Note      Name: Joselin Phillips  Clinic Number: [id number]    Therapy Diagnosis:   Encounter Diagnoses   Name Primary?    Decreased range of motion of left ankle Yes    Impaired functional mobility, balance, gait, and endurance      Physician: Alexey Lauren DPM    Visit Date: 6/15/2023    Physician Orders: PT Eval and Treat   Medical Diagnosis from Referral:   M67.88 (ICD-10-CM) - Achilles tendinosis of left lower extremity   M25.572 (ICD-10-CM) - Pain of joint of left ankle and foot   Evaluation Date: 6/9/2023  Authorization Period Expiration: 5/14/2024  Plan of Care Expiration: 9/15/2023 or 20v post eval  Visit # (episodes) / Visits authorized:  3 / eval + 20  (POC 2 / 20)   2nd FOTO visit 5  3rd FOTO visit 20  Progress Note Due: 7/9/2023        Time In: 0854  Time Out: 0949  Total Billable Time: 55 minutes     Precautions: CAM boot in community but released to transition into shoe; LBP hx  Insurance: Payor: [employer] / Plan: [employer] ([facility]) / Product Type: Commercial /     Subjective     Pt reports: wearing CAM boot when she goes to [employer]    She was compliant with home exercise program.  Response to previous treatment: sore  Functional change: none reported    Pain: 5/10  Location: Left achilles; incision region    Objective      Objective Measures updated at progress report unless specified.     Treatment     DOS: 3/2/2023  REPAIR, TENDON, LOWER EXTREMITY Left     EXCISION,YNES DEFORMITY,CALCANEUS Left     RESECTION, MUSCLE, GASTROCNEMIUS Left     Surgeon: Able to transition into shoe at this point; supposed to bring tennis shoe  [employer] employee    Thalia received the treatments listed below:      Thalia received therapeutic exercises to develop strength, endurance, ROM, and flexibility for 35 minutes including:  NMRE utilized to activate appropriate mm to improve balance, proprioception, stability and  "gait skills: 10 minutes   THERAPEUTIC ACTIVITY x 10 minutes to improve functional activities:     Bike x 10 minutes    SEATED: with footstool  Ankle circles, 20x ea direction  Ankle alphabet, 1 set  Gastroc strap stretch, 3x30s  NOT PERFORMED - standing today  Red Theraband Ankle 4-way AROM, 30x ea, stabilize leg  Arch lifts, 30x5s  NMRE   Toe ext rolls, 5-4-3-2-1, 30x  NMRE  DF Slideboard, 10x10s  Tennis Ball Massage of medial arch, 2 minutes    Wobble board, A-P, M-L, Circles, 20x ea     STAND:  Slant board Gastroc stretch 3 x 30 sec Bilateral  HR/TR x 30  Single Leg Stance 3 x 20 sec  NMRE  Step ups 4" x 30   THERAPEUTIC ACTIVITY   Hip 3 way 2 x 10 Bilateral, standing on green disc  NMRE     Pre-Gait train: 1 min each (airex optional) - NO BOOT  1. March with even weight distribution and no leaning   2. Stagger: Affected fleg in front: Heel contact -> Flat foot with KE (mid stance)  THERAPEUTIC ACTIVITY   3. Stagger: Affected leg in back: Roll through -> push off   THERAPEUTIC ACTIVITY   4. Ambulate Hallway, MIRROR  NOT PERFORMED        Manual tx: 0 minutes  Scar Mobilization: parallel and cross friction; smoothing; lifting  Myofascial Release with ART of gastroc         Add NEXT visit:  Standing Gastroc stretch  FUTURE visit:  Squats  Stand HR / Toe Raises  INV/EV 1/2 foam  Soleus Heel Raises   FSU / FSD  Single heel raises  SLB  Ecc HR  Walking  Red Theraband lateral steps  Red Theraband Abduction       Patient Education and Home Exercises       Education provided:   - daily HEP  - added Red Theraband to ankle AROM    Written Home Exercises Provided: Patient instructed to cont prior HEP. Exercises were reviewed and Thalia was able to demonstrate them prior to the end of the session.  Thalia demonstrated good  understanding of the education provided. See EMR under Patient Instructions for exercises provided during therapy sessions    Assessment     Thalia presented with Bilateral shoes, with decreased stance " time and toe off with her gait pattern.  She tolerated additional PRE''s with good form and no increase in s/s.  She was able to correct her gait pattern to a more equal stepping pattern and decreased lateral shift after Verbal cues.      Thalia Is progressing well towards her goals.   Pt prognosis is Good.     Pt will continue to benefit from skilled outpatient physical therapy to address the deficits listed in the problem list box on initial evaluation, provide pt/family education and to maximize pt's level of independence in the home and community environment.     Pt's spiritual, cultural and educational needs considered and pt agreeable to plan of care and goals.     Anticipated barriers to physical therapy: chronicity of condition    Goals:   Short Term Goals: 5 weeks   6/15/2023  ongoing  1. Pt will demonstrate compliance with HEP.  2. Pt will decrease swelling of ankle to 50.5 cm to reduce pain performing daily life functions.  3. Pt will increase DF ROM by 10 degrees to improve gait.      Long Term Goals: 10 weeks   6/15/2023  ongoing  1. Pt will demonstrate independence with HEP.  2. Pt will increase left ankle, knee and hip strength to 1/2 grade in order to increase WB tolerance.  3. Pt will improve FOTO limitation score to </= 27% to show improvement in condition and functional mobility.   4. Pt will be able to return to work with 1/10 pain levels and minimal edema.    Plan     PT as deemed per POC: ankle Range of Motion, strengthening, gait training    Maria Sebastian, PTA

## 2023-06-15 NOTE — PROGRESS NOTES
PT/PTA met face to face to discuss pt's treatment plan and progress towards established goals. Pt will be seen by a physical therapist minimally every 6th visit or every 30 days.    Maria Sebastian PTA

## 2023-06-20 ENCOUNTER — PATIENT MESSAGE (OUTPATIENT)
Dept: PODIATRY | Facility: CLINIC | Age: 53
End: 2023-06-20
Payer: COMMERCIAL

## 2023-06-20 NOTE — TELEPHONE ENCOUNTER
Spoke to patient who was advised we have a fax confirmation where the paperwork was sent to the Fax number on the bottom of the paperwork. Patient states she will call [email or web address] and let us know tomorrow if we need to resend the paperwork to another fax number.

## 2023-06-21 ENCOUNTER — PATIENT MESSAGE (OUTPATIENT)
Dept: PODIATRY | Facility: CLINIC | Age: 53
End: 2023-06-21
Payer: COMMERCIAL

## 2023-06-22 ENCOUNTER — CLINICAL SUPPORT (OUTPATIENT)
Dept: REHABILITATION | Facility: HOSPITAL | Age: 53
End: 2023-06-22
Payer: COMMERCIAL

## 2023-06-22 DIAGNOSIS — Z74.09 IMPAIRED FUNCTIONAL MOBILITY, BALANCE, GAIT, AND ENDURANCE: ICD-10-CM

## 2023-06-22 DIAGNOSIS — M25.672 DECREASED RANGE OF MOTION OF LEFT ANKLE: Primary | ICD-10-CM

## 2023-06-22 PROCEDURE — 97112 NEUROMUSCULAR REEDUCATION: CPT | Mod: PN

## 2023-06-22 NOTE — PROGRESS NOTES
[facility]   Physical Therapy Treatment Note      Name: Joselin Phillips  Clinic Number: [id number]    Therapy Diagnosis:   Encounter Diagnoses   Name Primary?    Decreased range of motion of left ankle Yes    Impaired functional mobility, balance, gait, and endurance      Physician: Alexey Lauren DPM    Visit Date: 6/22/2023    Physician Orders: PT Eval and Treat   Medical Diagnosis from Referral:   M67.88 (ICD-10-CM) - Achilles tendinosis of left lower extremity   M25.572 (ICD-10-CM) - Pain of joint of left ankle and foot   Evaluation Date: 6/9/2023  Authorization Period Expiration: 5/14/2024  Plan of Care Expiration: 9/15/2023 or 20v post eval  Visit # (episodes) / Visits authorized:  4 / eval + 20  (POC 3 / 20)   2nd FOTO visit 5  3rd FOTO visit 20  Progress Note Due: 7/9/2023        Time In: 200  Time Out: 255  Total Billable Time: 30 minutes     Precautions: CAM boot in community but released to transition into shoe; LBP hx  Insurance: Payor: [employer] / Plan: [email or web address] ([employer]) / Product Type: Commercial /     Subjective     Pt reports: had family over Father's day weekend and was on feet too long; has been in pain since. Did not come on Tuesday b/c she was in too much pain; discussed importance of coming while in pain so we can help her and make modifications as needed.     She was compliant with home exercise program.  Response to previous treatment: sore  Functional change: none reported    Pain: 4-5/10  Location: Left achilles; incision region    Objective      Objective Measures updated at progress report unless specified.     Treatment     DOS: 3/2/2023  REPAIR, TENDON, LOWER EXTREMITY Left     EXCISION,YNES DEFORMITY,CALCANEUS Left     RESECTION, MUSCLE, GASTROCNEMIUS Left     Surgeon: Able to transition into shoe at this point; supposed to bring tennis shoe  [employer] employee    Thalia received the treatments listed below:      Thalia  "received therapeutic exercises to develop strength, endurance, ROM, and flexibility for 20 minutes including:  NMRE utilized to activate appropriate mm to improve balance, proprioception, stability and gait skills: 27 minutes     Bike x 10 minutes    SEATED: with footstool  Ankle circles, 20x ea direction  Ankle alphabet, 1 set  Gastroc strap stretch, 3x30s / DF Mobility 20x  Red Theraband Ankle 4-way AROM, 30x ea, stabilize leg  Arch lifts, 30x5s  NMRE   Toe ext rolls, 5-4-3-2-1, 30x  NMRE  DF Slideboard, 10x10s  Wobble board, A-P, M-L, Circles, 20x ea  Tennis Ball Massage of medial arch, 2 minutes     STAND: all to tolerance  +DF/KF on stairs, 20x  HR/TR x 30  +Mini Squats, 2x10 Bilateral   +INV/EV 1/2 foam, 20-30x  Slant board Gastroc stretch 3 x 30 sec Bilateral - NOT PERFORMED time    Pre-Gait train: (airex optional)   1. March with even weight distribution and no leaning, 1 min  2. Stagger: Affected fleg in front: Heel contact -> Flat foot with KE (mid stance), 30 sec    3. Stagger: Affected leg in back: Roll through -> push off, 30 sec     4. Ambulate Hallway, MIRROR        NOT PERFORMED - HOLD single leg Weight Bearing until pain and swelling reduces  Single Leg Stance 3 x 20 sec    Step ups 4" x 30     Hip 3 way 2 x 10 Bilateral, standing on green disc        Manual tx: 8 minutes  Scar Mobilization: parallel and cross friction; smoothing; lifting  Myofascial Release with ART of gastroc - NOT PERFORMED   Elevated edema massage       Add NEXT visit:  FUTURE visit:  Soleus Heel Raises   FSU / FSD  Single heel raises  Ecc HR  Walking  Red Theraband lateral steps  Red Theraband Abduction       Patient Education and Home Exercises       Education provided:   - daily HEP  - added Red Theraband to ankle AROM    Written Home Exercises Provided: Patient instructed to cont prior HEP. Exercises were reviewed and Thalia was able to demonstrate them prior to the end of the session.  Thalia demonstrated good  " understanding of the education provided. See EMR under Patient Instructions for exercises provided during therapy sessions    Assessment     Thalia demonstrates increased pain and swelling due to overdoing it on her feet with family activity. Did not come to therapy b/c she thought it would be too painful; discussed we make modifications as needed and can address her pain with treatment. Pt able to perform all seated therex and new double leg Weight Bearing. She was able to correct her gait pattern to a more equal stepping pattern and decreased lateral shift after Verbal and visual cues.      Thalia Is progressing well towards her goals.   Pt prognosis is Good.     Pt will continue to benefit from skilled outpatient physical therapy to address the deficits listed in the problem list box on initial evaluation, provide pt/family education and to maximize pt's level of independence in the home and community environment.     Pt's spiritual, cultural and educational needs considered and pt agreeable to plan of care and goals.     Anticipated barriers to physical therapy: chronicity of condition    Goals:   Short Term Goals: 5 weeks   6/22/2023  ongoing  1. Pt will demonstrate compliance with HEP.  2. Pt will decrease swelling of ankle to 50.5 cm to reduce pain performing daily life functions.  3. Pt will increase DF ROM by 10 degrees to improve gait.      Long Term Goals: 10 weeks   6/22/2023  ongoing  1. Pt will demonstrate independence with HEP.  2. Pt will increase left ankle, knee and hip strength to 1/2 grade in order to increase WB tolerance.  3. Pt will improve FOTO limitation score to </= 27% to show improvement in condition and functional mobility.   4. Pt will be able to return to work with 1/10 pain levels and minimal edema.    Plan     PT as deemed per POC: ankle Range of Motion, strengthening, gait training    Tish Sumner, PT

## 2023-06-26 ENCOUNTER — OFFICE VISIT (OUTPATIENT)
Dept: PODIATRY | Facility: CLINIC | Age: 53
End: 2023-06-26
Payer: COMMERCIAL

## 2023-06-26 DIAGNOSIS — M77.32 EXOSTOSIS OF LEFT POSTERIOR CALCANEUS: ICD-10-CM

## 2023-06-26 DIAGNOSIS — M25.572 PAIN OF JOINT OF LEFT ANKLE AND FOOT: ICD-10-CM

## 2023-06-26 DIAGNOSIS — M25.572 CHRONIC PAIN OF LEFT ANKLE: ICD-10-CM

## 2023-06-26 DIAGNOSIS — G89.29 CHRONIC PAIN OF LEFT ANKLE: ICD-10-CM

## 2023-06-26 DIAGNOSIS — M67.88 ACHILLES TENDINOSIS OF LEFT LOWER EXTREMITY: Primary | ICD-10-CM

## 2023-06-26 PROCEDURE — 1159F PR MEDICATION LIST DOCUMENTED IN MEDICAL RECORD: ICD-10-PCS | Mod: CPTII,S$GLB,, | Performed by: PODIATRIST

## 2023-06-26 PROCEDURE — 99213 OFFICE O/P EST LOW 20 MIN: CPT | Mod: S$GLB,,, | Performed by: PODIATRIST

## 2023-06-26 PROCEDURE — 99999 PR PBB SHADOW E&M-EST. PATIENT-LVL III: CPT | Mod: PBBFAC,,, | Performed by: PODIATRIST

## 2023-06-26 PROCEDURE — 1160F RVW MEDS BY RX/DR IN RCRD: CPT | Mod: CPTII,S$GLB,, | Performed by: PODIATRIST

## 2023-06-26 PROCEDURE — 1160F PR REVIEW ALL MEDS BY PRESCRIBER/CLIN PHARMACIST DOCUMENTED: ICD-10-PCS | Mod: CPTII,S$GLB,, | Performed by: PODIATRIST

## 2023-06-26 PROCEDURE — 99999 PR PBB SHADOW E&M-EST. PATIENT-LVL III: ICD-10-PCS | Mod: PBBFAC,,, | Performed by: PODIATRIST

## 2023-06-26 PROCEDURE — 99213 PR OFFICE/OUTPT VISIT, EST, LEVL III, 20-29 MIN: ICD-10-PCS | Mod: S$GLB,,, | Performed by: PODIATRIST

## 2023-06-26 PROCEDURE — 1159F MED LIST DOCD IN RCRD: CPT | Mod: CPTII,S$GLB,, | Performed by: PODIATRIST

## 2023-06-26 NOTE — PROGRESS NOTES
[address]  [place] [address]  Phone: [phone]   [phone]    Patient's PCP:Orlando Mohan MD  Referring Provider: No ref. provider found    Subjective:      Chief Complaint:: Follow-up (Repair of Achilles tendon left 2.  Resection of calcaneal exostosis left 03/02/2023)    SATISH Phillips is a 52 y.o. female who presents today for follow-up on  Repair of Achilles tendon left 2.  Resection of calcaneal exostosis left preformed 03/02/2023. The symptoms include Swelling and intermittent sharp pain. The symptoms are aggravated by walking, weightbearing prolonged use. The problem has waxed and weaned with activity. Treatment to date have included twice weekly physical therapy visits, pain management medications, ice and currently in supportive running shoes does use boot if pain is too much.  She states that overall her symptoms are continuing to improve.      Past Surgical History:   Procedure Laterality Date    COLONOSCOPY N/A 09/21/2021    Procedure: COLONOSCOPY;  Surgeon: Zach Hemphill MD;  Location: [facility];  Service: Endoscopy;  Laterality: N/A;    CYSTOSCOPY N/A 10/28/2021    Procedure: CYSTOSCOPY;  Surgeon: Merlin Milligan MD;  Location: [facility];  Service: OB/GYN;  Laterality: N/A;    EXCISION,YNES DEFORMITY,CALCANEUS Left 3/2/2023    Procedure: EXCISION,YNES DEFORMITY,CALCANEUS;  Surgeon: Alexey Lauren DPM;  Location: [facility] OR;  Service: Podiatry;  Laterality: Left;    FOOT SURGERY Left 2017    REPAIR OF TENDON OF LOWER EXTREMITY Left 3/2/2023    Procedure: REPAIR, TENDON, LOWER EXTREMITY;  Surgeon: Alexey Lauren DPM;  Location: [facility] OR;  Service: Podiatry;  Laterality: Left;    RESECTION OF GASTROCNEMIUS MUSCLE Left 3/2/2023    Procedure: RESECTION, MUSCLE, GASTROCNEMIUS;  Surgeon: Alexey Lauren DPM;  Location: [facility] OR;  Service: Podiatry;  Laterality: Left;    ROBOT-ASSISTED LAPAROSCOPIC HYSTERECTOMY N/A 10/28/2021    Procedure: ROBOTIC  HYSTERECTOMY;  Surgeon: Merlin Milligan MD;  Location: [facility];  Service: OB/GYN;  Laterality: N/A;    ROBOT-ASSISTED LAPAROSCOPIC SALPINGO-OOPHORECTOMY Bilateral 10/28/2021    Procedure: ROBOTIC SALPINGO-OOPHORECTOMY;  Surgeon: Merlin Milligan MD;  Location: [facility];  Service: OB/GYN;  Laterality: Bilateral;    TUBAL LIGATION Bilateral 10/29/2022     Past Medical History:   Diagnosis Date    Asthma     childhood-since resolved.    Foot pain     GERD (gastroesophageal reflux disease)     Ovarian mass     Pelvic mass     Pelvic pain     Plantar fasciitis      Family History   Problem Relation Age of Onset    Arthritis Mother     Heart disease Father     Hypertension Father     Kidney disease Father     Colon cancer Neg Hx     Breast cancer Neg Hx     Ovarian cancer Neg Hx         Social History:   Marital Status:   Alcohol History:  reports that she does not currently use alcohol after a past usage of about 1.0 standard drink per week.  Tobacco History:  reports that she has never smoked. She has never used smokeless tobacco.  Drug History:  reports no history of drug use.    Review of patient's allergies indicates:  No Known Allergies    Current Outpatient Medications   Medication Sig Dispense Refill    HYDROcodone-acetaminophen (NORCO)  mg per tablet Take 1 tablet by mouth every 6 (six) hours as needed for Pain. 30 tablet 0    naproxen (NAPROSYN) 250 MG tablet Take 250 mg by mouth 2 (two) times daily.      ondansetron (ZOFRAN-ODT) 4 MG TbDL Take 2 tablets (8 mg total) by mouth every 8 (eight) hours as needed (nausea). 30 tablet 0    traMADoL (ULTRAM) 50 mg tablet Take 1 tablet (50 mg total) by mouth every 6 (six) hours as needed for Pain. 28 tablet 0     No current facility-administered medications for this visit.       Review of Systems   Constitutional:  Negative for chills, fatigue, fever and unexpected weight change.   HENT:  Negative for hearing loss and trouble swallowing.    Eyes:   Negative for photophobia and visual disturbance.   Respiratory:  Negative for cough, shortness of breath and wheezing.    Cardiovascular:  Negative for chest pain, palpitations and leg swelling.   Gastrointestinal:  Negative for abdominal pain and nausea.   Genitourinary:  Negative for dysuria and frequency.   Musculoskeletal:  Positive for joint swelling. Negative for arthralgias, back pain, gait problem and myalgias.   Skin:  Negative for rash and wound.   Neurological:  Negative for tremors, seizures, weakness and headaches.   Hematological:  Does not bruise/bleed easily.       Objective:        Physical Exam:   Foot Exam    General  General Appearance: appears stated age and healthy   Orientation: alert and oriented to person, place, and time   Affect: appropriate   Gait: antalgic       Left Foot/Ankle      Inspection and Palpation  Ecchymosis: none  Tenderness: calcaneus tenderness (Mild tenderness at distal surgical incision)  Swelling: none   Arch: pes planus  Hammertoes: absent  Claw toes: absent  Skin Exam: skin intact;   Neurovascular  Dorsalis pedis: 2+  Posterior tibial: 2+  Capillary refill: 2+  Saphenous nerve sensation: normal  Tibial nerve sensation: normal  Superficial peroneal nerve sensation: normal  Deep peroneal nerve sensation: normal  Sural nerve sensation: normal    Edema  Type of edema: non-pitting    Muscle Strength  Ankle dorsiflexion: 5  Ankle plantar flexion: 4+  Ankle inversion: 5  Ankle eversion: 5  Great toe extension: 5  Great toe flexion: 5    Range of Motion    Passive  Ankle dorsiflexion: 5      Tests  Anterior drawer: negative   Calcaneal squeeze: negative   Talar tilt: negative   PT Tinel's sign: negative  Paresthesia: negative  Comments  There is an area of palpable thickening in the distal Achilles tendon.  No palpable void is noted.  Physical Exam  Cardiovascular:      Pulses:           Dorsalis pedis pulses are 2+ on the left side.        Posterior tibial pulses are 2+ on  the left side.             Left Ankle/Foot Exam     Comments:  There is an area of palpable thickening in the distal Achilles tendon.  No palpable void is noted.      Muscle Strength   Left Lower Extremity   Ankle Dorsiflexion:  5   Plantar flexion:  4+/5     Vascular Exam       Left Pulses  Dorsalis Pedis:      2+  Posterior Tibial:      2+         Imaging: none            Assessment:       1. Achilles tendinosis of left lower extremity    2. Pain of joint of left ankle and foot    3. Exostosis of left posterior calcaneus    4. Chronic pain of left ankle      Plan:   Achilles tendinosis of left lower extremity    Pain of joint of left ankle and foot    Exostosis of left posterior calcaneus    Chronic pain of left ankle      Follow up in about 4 weeks (around 7/24/2023), or if symptoms worsen or fail to improve.    Procedures        Patient is continuing to make progress.  I discussed her that both her strength in her range of motion is improving.  I encouraged her to continue to work on progressing her activities as well as try different shoe gear.  I do recommend that she continue with physical therapy.  Patient asked about getting in a pool and she is fine for this.  Ice as needed for any pain or swelling.    Counseling:     I provided patient education verbally regarding:   Patient diagnosis, treatment options, as well as alternatives, risks, and benefits.     This note was created using Dragon voice recognition software that occasionally misinterpreted phrases or words.

## 2023-06-27 ENCOUNTER — CLINICAL SUPPORT (OUTPATIENT)
Dept: REHABILITATION | Facility: HOSPITAL | Age: 53
End: 2023-06-27
Payer: COMMERCIAL

## 2023-06-27 DIAGNOSIS — M25.672 DECREASED RANGE OF MOTION OF LEFT ANKLE: Primary | ICD-10-CM

## 2023-06-27 DIAGNOSIS — Z74.09 IMPAIRED FUNCTIONAL MOBILITY, BALANCE, GAIT, AND ENDURANCE: ICD-10-CM

## 2023-06-27 PROCEDURE — 97110 THERAPEUTIC EXERCISES: CPT | Mod: PN,CQ

## 2023-06-27 NOTE — PROGRESS NOTES
[facility]   Physical Therapy Treatment Note      Name: Joselin Phillips  Clinic Number: [id number]    Therapy Diagnosis:   Encounter Diagnoses   Name Primary?    Decreased range of motion of left ankle Yes    Impaired functional mobility, balance, gait, and endurance      Physician: Alexey Lauren DPM    Visit Date: 6/27/2023    Physician Orders: PT Eval and Treat   Medical Diagnosis from Referral:   M67.88 (ICD-10-CM) - Achilles tendinosis of left lower extremity   M25.572 (ICD-10-CM) - Pain of joint of left ankle and foot   Evaluation Date: 6/9/2023  Authorization Period Expiration: 5/14/2024  Plan of Care Expiration: 9/15/2023 or 20v post eval  Visit # (episodes) / Visits authorized:  5 / eval + 20  (POC 4 / 20)   2nd FOTO visit 5  3rd FOTO visit 20  Progress Note Due: 7/9/2023        Time In: 0901  Time Out: 0955  Total Billable Time: 54 minutes     Precautions: CAM boot in community but released to transition into shoe; LBP hx  Insurance: Payor: [employer] / Plan: [employer] ([employer]) / Product Type: Commercial /     Subjective     Pt reports: minimal discomfort today    She was compliant with home exercise program.  Response to previous treatment: good  Functional change: no longer wearing the boot    Pain: 2/10  Location: Left achilles; incision region    Objective      Objective Measures updated at progress report unless specified.     Treatment     DOS: 3/2/2023  REPAIR, TENDON, LOWER EXTREMITY Left     EXCISION,YNES DEFORMITY,CALCANEUS Left     RESECTION, MUSCLE, GASTROCNEMIUS Left     Surgeon: Able to transition into shoe at this point; supposed to bring tennis shoe  [employer] employee    Thalia received the treatments listed below:      Thalia received therapeutic exercises to develop strength, endurance, ROM, and flexibility for 25 minutes including:  NMRE utilized to activate appropriate mm to improve balance, proprioception, stability and gait  "skills: 29  minutes     Bike x 10 minutes    SEATED: with footstool  Ankle circles, 20x ea direction  Ankle alphabet, 1 set  Gastroc strap stretch, 3x30s / DF Mobility 30x -  mobility only  Red Theraband Ankle 4-way AROM, 30x ea, stabilize leg  Arch lifts, 30x5s  NMRE   Toe ext rolls, 5-4-3-2-1, 30x  NMRE  DF Slideboard, 10x10s  Wobble board, A-P, M-L, Circles, 20x ea  NOT PERFORMED   Tennis Ball Massage of medial arch, 2 minutes  NOT PERFORMED      STAND: all to tolerance  DF/KF on stairs, 20x  HR/TR x 30  Mini Squats, 2x10 Bilateral   INV/EV 1/2 foam, 20-30x  Slant board Gastroc stretch 3 x 30 sec Bilateral   Single Leg Stance 3 x 20 sec  -  finger tips    Pre-Gait train: (airex optional)   1. March with even weight distribution and no leaning, 1 min  2. Stagger: Affected fleg in front: Heel contact -> Flat foot with KE (mid stance), 30 sec    3. Stagger: Affected leg in back: Roll through -> push off, 30 sec     4. Ambulate Hallway, MIRROR      NOT PERFORMED   Step ups 4" x 30     Hip 3 way 2 x 10 Bilateral, standing on green disc        Manual tx: 0 minutes  Scar Mobilization: parallel and cross friction; smoothing; lifting  Myofascial Release with ART of gastroc - NOT PERFORMED   Elevated edema massage       Add NEXT visit:  FUTURE visit:  Soleus Heel Raises   FSU / FSD  Single heel raises  Ecc HR  Walking  Red Theraband lateral steps  Red Theraband Abduction       Patient Education and Home Exercises       Education provided:   - daily HEP  - added Red Theraband to ankle AROM    Written Home Exercises Provided: Patient instructed to cont prior HEP. Exercises were reviewed and Thalia was able to demonstrate them prior to the end of the session.  Thalia demonstrated good  understanding of the education provided. See EMR under Patient Instructions for exercises provided during therapy sessions    Assessment     Thalia ambulates with decreased roll off of her Left Lower Extremity, but this has improved overall.  " She tolerated all PRE's with good form and minimal pain provocation.  She is making good progress and will benefit from continued therapy to address goals listed on initial evaluation.    Thalia Is progressing well towards her goals.   Pt prognosis is Good.     Pt will continue to benefit from skilled outpatient physical therapy to address the deficits listed in the problem list box on initial evaluation, provide pt/family education and to maximize pt's level of independence in the home and community environment.     Pt's spiritual, cultural and educational needs considered and pt agreeable to plan of care and goals.     Anticipated barriers to physical therapy: chronicity of condition    Goals:   Short Term Goals: 5 weeks   6/27/2023  ongoing  1. Pt will demonstrate compliance with HEP.  2. Pt will decrease swelling of ankle to 50.5 cm to reduce pain performing daily life functions.  3. Pt will increase DF ROM by 10 degrees to improve gait.      Long Term Goals: 10 weeks   6/27/2023  ongoing  1. Pt will demonstrate independence with HEP.  2. Pt will increase left ankle, knee and hip strength to 1/2 grade in order to increase WB tolerance.  3. Pt will improve FOTO limitation score to </= 27% to show improvement in condition and functional mobility.   4. Pt will be able to return to work with 1/10 pain levels and minimal edema.    Plan     PT as deemed per POC: ankle Range of Motion, strengthening, gait training    Maria Sebastian, PTA

## 2023-06-29 ENCOUNTER — CLINICAL SUPPORT (OUTPATIENT)
Dept: REHABILITATION | Facility: HOSPITAL | Age: 53
End: 2023-06-29
Payer: COMMERCIAL

## 2023-06-29 DIAGNOSIS — Z74.09 IMPAIRED FUNCTIONAL MOBILITY, BALANCE, GAIT, AND ENDURANCE: ICD-10-CM

## 2023-06-29 DIAGNOSIS — M25.672 DECREASED RANGE OF MOTION OF LEFT ANKLE: Primary | ICD-10-CM

## 2023-06-29 PROCEDURE — 97112 NEUROMUSCULAR REEDUCATION: CPT | Mod: PN

## 2023-06-29 PROCEDURE — 97110 THERAPEUTIC EXERCISES: CPT | Mod: PN

## 2023-06-29 NOTE — PROGRESS NOTES
[facility]   Physical Therapy Treatment Note      Name: Joselin Phillips  Clinic Number: [id number]    Therapy Diagnosis:   Encounter Diagnoses   Name Primary?    Decreased range of motion of left ankle Yes    Impaired functional mobility, balance, gait, and endurance      Physician: Alexey Lauren DPM    Visit Date: 6/29/2023    Physician Orders: PT Eval and Treat   Medical Diagnosis from Referral:   M67.88 (ICD-10-CM) - Achilles tendinosis of left lower extremity   M25.572 (ICD-10-CM) - Pain of joint of left ankle and foot   Evaluation Date: 6/9/2023  Authorization Period Expiration: 5/14/2024  Plan of Care Expiration: 9/15/2023 or 20v post eval  Visit # (episodes) / Visits authorized:  6 / eval + 20  (POC 5 / 20)   2nd FOTO visit 5 - 6/29/23  3rd FOTO visit 20  Progress Note Due: 7/9/2023        Time In: 200  Time Out: 255  Total Billable Time: 55 minutes     Precautions: CAM boot in community but released to transition into shoe; LBP hx  Insurance: Payor: [employer] / Plan: [employer] ([facility]) / Product Type: Commercial /     Subjective     Pt reports: minimal discomfort today    She was compliant with home exercise program.  Response to previous treatment: good  Functional change: no longer wearing the boot    Pain: 2/10  Location: Left achilles; incision region    Objective      Objective Measures updated at progress report unless specified.     Treatment     DOS: 3/2/2023  REPAIR, TENDON, LOWER EXTREMITY Left     EXCISION,YNES DEFORMITY,CALCANEUS Left     RESECTION, MUSCLE, GASTROCNEMIUS Left     Surgeon: Able to transition into shoe at this point; supposed to bring tennis shoe  [employer] employee    Thalia received the treatments listed below:      Thalia received therapeutic exercises to develop strength, endurance, ROM, and flexibility for 27 minutes including:  NMRE utilized to activate appropriate mm to improve balance, proprioception, stability  and gait skills: 28  minutes     Bike x 10 minutes    SEATED: with footstool  Ankle circles, 20x ea direction  Ankle alphabet, 1 set  Gastroc strap stretch, 3x30s / DF Mobility 30x   Red Theraband Ankle 3-way AROM: DF / PF / EV, 30x ea, stabilize leg  Arch lifts, 30x5s  NMRE   Toe ext rolls, 5-4-3-2-1, 30x  NMRE - both directions 15x ea  DF Slideboard, 10x10s  Wobble board, A-P, M-L, Circles, 20x ea  NOT PERFORMED   Tennis Ball Massage of medial arch, 2 minutes  NOT PERFORMED      STAND: all to tolerance  DF/KF on stairs, 20x  HR/TR x 30  Mini Squats, 2x10 Bilateral - NOT PERFORMED time   FSU 30x, stairs  FSD stairs, 20-30x, allowed heel to come up  Soleus Heel Raises, 20-30x - Eccentric instead next  Slant board Gastroc stretch 3 x 30 sec Bilateral   INV/EV 1/2 foam, 20-30x  Single Leg Stance, Airex 3 x 20 sec  -  finger tips    Pre-Gait train:   1. March with even weight distribution and no leaning, 1 min  2. Stagger: Affected fleg in front: Heel contact -> Flat foot with KE (mid stance), 30 sec    3. Stagger: Affected leg in back: Roll through -> push off, 30 sec     4. Ambulate Hallway, MIRROR - NOT PERFORMED       NOT PERFORMED   Hip 3 way 2 x 10 Bilateral, standing on green disc        Manual tx: 0 minutes  Scar Mobilization: parallel and cross friction; smoothing; lifting  Myofascial Release with ART of gastroc - NOT PERFORMED   Elevated edema massage       Add NEXT visit:  Ecc HR  FUTURE visit:  Single heel raises  Walking  Red Theraband lateral steps  Red Theraband Abduction       Patient Education and Home Exercises       Education provided:   - daily HEP  - added Red Theraband to ankle AROM    Written Home Exercises Provided: Patient instructed to cont prior HEP. Exercises were reviewed and Thalia was able to demonstrate them prior to the end of the session.  Thalia demonstrated good  understanding of the education provided. See EMR under Patient Instructions for exercises provided during therapy  sessions    Assessment     Thalia demonstrates lower pain levels at arrival and able to tolerate more Weight Bearing therex today. Continues to ambulate with decreased roll through and push off of her Left foot is improving; Bilateral trendelenburg (>Left). Continue Range of Motion, strengthening and stability to reduce gait compensations and deficits.     Thalia Is progressing well towards her goals.   Pt prognosis is Good.     Pt will continue to benefit from skilled outpatient physical therapy to address the deficits listed in the problem list box on initial evaluation, provide pt/family education and to maximize pt's level of independence in the home and community environment.     Pt's spiritual, cultural and educational needs considered and pt agreeable to plan of care and goals.     Anticipated barriers to physical therapy: chronicity of condition    Goals:   Short Term Goals: 5 weeks   6/29/2023  ongoing  1. Pt will demonstrate compliance with HEP.  2. Pt will decrease swelling of ankle to 50.5 cm to reduce pain performing daily life functions.  3. Pt will increase DF ROM by 10 degrees to improve gait.      Long Term Goals: 10 weeks   6/29/2023  ongoing  1. Pt will demonstrate independence with HEP.  2. Pt will increase left ankle, knee and hip strength to 1/2 grade in order to increase WB tolerance.  3. Pt will improve FOTO limitation score to </= 27% to show improvement in condition and functional mobility.   4. Pt will be able to return to work with 1/10 pain levels and minimal edema.    Plan     PT as deemed per POC: ankle Range of Motion, strengthening, gait training    Tish Sumner, PT

## 2023-07-06 ENCOUNTER — CLINICAL SUPPORT (OUTPATIENT)
Dept: REHABILITATION | Facility: HOSPITAL | Age: 53
End: 2023-07-06

## 2023-07-06 DIAGNOSIS — M25.672 DECREASED RANGE OF MOTION OF LEFT ANKLE: Primary | ICD-10-CM

## 2023-07-06 DIAGNOSIS — Z74.09 IMPAIRED FUNCTIONAL MOBILITY, BALANCE, GAIT, AND ENDURANCE: ICD-10-CM

## 2023-07-06 PROCEDURE — 97110 THERAPEUTIC EXERCISES: CPT | Mod: PN,CQ

## 2023-07-06 PROCEDURE — 97112 NEUROMUSCULAR REEDUCATION: CPT | Mod: PN,CQ

## 2023-07-06 NOTE — PROGRESS NOTES
[facility]   Physical Therapy Treatment Note      Name: Joselin Phillips  Clinic Number: [id number]    Therapy Diagnosis:   Encounter Diagnoses   Name Primary?    Decreased range of motion of left ankle Yes    Impaired functional mobility, balance, gait, and endurance      Physician: Alexey Lauren DPM    Visit Date: 7/6/2023    Physician Orders: PT Eval and Treat   Medical Diagnosis from Referral:   M67.88 (ICD-10-CM) - Achilles tendinosis of left lower extremity   M25.572 (ICD-10-CM) - Pain of joint of left ankle and foot   Evaluation Date: 6/9/2023  Authorization Period Expiration: 5/14/2024  Plan of Care Expiration: 9/15/2023 or 20v post eval  Visit # (episodes) / Visits authorized:  7 / eval + 20  (POC 6 / 20)   2nd FOTO visit 5 - 6/29/23  3rd FOTO visit 20  Progress Note Due: 7/9/2023        Time In: 0859  Time Out: 1000  Total Billable Time: 60 minutes     Precautions: CAM boot in community but released to transition into shoe; LBP hx  Insurance: Payor: [employer] / Plan: [employer] ([facility]) / Product Type: Commercial /     Subjective     Pt reports: difficulty going up/down steps at her pool    She was compliant with home exercise program.  Response to previous treatment: good  Functional change: improved gait pattern    Pain: 2/10  Location: Left achilles; incision region    Objective      Objective Measures updated at progress report unless specified.     Treatment     DOS: 3/2/2023  REPAIR, TENDON, LOWER EXTREMITY Left     EXCISION,YNES DEFORMITY,CALCANEUS Left     RESECTION, MUSCLE, GASTROCNEMIUS Left     Surgeon: Able to transition into shoe at this point; supposed to bring tennis shoe  [employer] employee    Thalia received the treatments listed below:      Thalia received therapeutic exercises to develop strength, endurance, ROM, and flexibility for 25 minutes including:  NMRE utilized to activate appropriate mm to improve balance,  "proprioception, stability and gait skills: 35  minutes     Bike x 10 minutes    SEATED: with footstool  Ankle circles, 20x ea direction  NOT PERFORMED   Ankle alphabet, 1 set  NOT PERFORMED   Gastroc strap stretch, 3x30s / DF Mobility 30x  NOT PERFORMED   Red Theraband Ankle 3-way AROM: DF / PF / EV, 30x ea, stabilize leg  NOT PERFORMED   Arch lifts, 20x5s  NMRE   Toe ext rolls, 5-4-3-2-1, 30x  NMRE - both directions 15x ea  DF Slideboard, 10x10s  Wobble board, A-P, M-L, Circles, 20x ea  NOT PERFORMED   Tennis Ball Massage of medial arch, 2 minutes  NOT PERFORMED      STAND: all to tolerance  DF/KF on stairs, 20x  HR/TR x 30  Forward step up 30x, stairs  Forward step downs, 4" x 20  Soleus Heel Raises, 20-30x - Eccentric instead next  Slant board Gastroc stretch 3 x 30 sec Bilateral   INV/EV 1/2 foam, 20-30x  Single Leg Stance, Airex 3 x 30 sec  -  finger tips  Hip 3 way 2 x 10 Bilateral, standing on green disc      Pre-Gait train:   NOT PERFORMED   1. March with even weight distribution and no leaning, 1 min  2. Stagger: Affected fleg in front: Heel contact -> Flat foot with KE (mid stance), 30 sec    3. Stagger: Affected leg in back: Roll through -> push off, 30 sec     4. Ambulate Hallway, MIRROR - NOT PERFORMED     Mini Squats, 2x10 Bilateral - NOT PERFORMED    Shuttle: 50# Bilateral x 20  Shuttle: 50# heel raises x 20  Shuttle: 25# Left x 20    Manual tx: 0 minutes  Scar Mobilization: parallel and cross friction; smoothing; lifting  Myofascial Release with ART of gastroc - NOT PERFORMED   Elevated edema massage       Add NEXT visit:  Ecc HR  FUTURE visit:  Single heel raises  Walking  Red Theraband lateral steps  Red Theraband Abduction       Patient Education and Home Exercises       Education provided:   - daily HEP  - added Red Theraband to ankle AROM    Written Home Exercises Provided: Patient instructed to cont prior HEP. Exercises were reviewed and Thalia was able to demonstrate them prior to the end of " the session.  Thalia demonstrated good  understanding of the education provided. See EMR under Patient Instructions for exercises provided during therapy sessions    Assessment     Thalia demonstrated R knee hyperextension with DF/Plantar Flexion on step.  Changed to forward facing on the steps and Verbal cues to flex R knee minimally to decrease hyperextension.  She arrived ambulating with decreased L toe off and trunk rotation to compensate for lack of toe off, but she did demonstrate improvement in her gait pattern at conclusion of therapy.    Thalia Is progressing well towards her goals.   Pt prognosis is Good.     Pt will continue to benefit from skilled outpatient physical therapy to address the deficits listed in the problem list box on initial evaluation, provide pt/family education and to maximize pt's level of independence in the home and community environment.     Pt's spiritual, cultural and educational needs considered and pt agreeable to plan of care and goals.     Anticipated barriers to physical therapy: chronicity of condition    Goals:   Short Term Goals: 5 weeks   7/6/2023  ongoing  1. Pt will demonstrate compliance with HEP.  2. Pt will decrease swelling of ankle to 50.5 cm to reduce pain performing daily life functions.  3. Pt will increase DF ROM by 10 degrees to improve gait.      Long Term Goals: 10 weeks   7/6/2023  ongoing  1. Pt will demonstrate independence with HEP.  2. Pt will increase left ankle, knee and hip strength to 1/2 grade in order to increase WB tolerance.  3. Pt will improve FOTO limitation score to </= 27% to show improvement in condition and functional mobility.   4. Pt will be able to return to work with 1/10 pain levels and minimal edema.    Plan     PT as deemed per POC: ankle Range of Motion, strengthening, gait training    Maria Sebastian, PTA

## 2023-07-07 ENCOUNTER — DOCUMENTATION ONLY (OUTPATIENT)
Dept: REHABILITATION | Facility: HOSPITAL | Age: 53
End: 2023-07-07

## 2023-07-11 ENCOUNTER — CLINICAL SUPPORT (OUTPATIENT)
Dept: REHABILITATION | Facility: HOSPITAL | Age: 53
End: 2023-07-11

## 2023-07-11 ENCOUNTER — PATIENT MESSAGE (OUTPATIENT)
Dept: PODIATRY | Facility: CLINIC | Age: 53
End: 2023-07-11

## 2023-07-11 DIAGNOSIS — Z74.09 IMPAIRED FUNCTIONAL MOBILITY, BALANCE, GAIT, AND ENDURANCE: ICD-10-CM

## 2023-07-11 DIAGNOSIS — M25.672 DECREASED RANGE OF MOTION OF LEFT ANKLE: Primary | ICD-10-CM

## 2023-07-11 PROCEDURE — 97112 NEUROMUSCULAR REEDUCATION: CPT | Mod: PN

## 2023-07-11 PROCEDURE — 97110 THERAPEUTIC EXERCISES: CPT | Mod: PN

## 2023-07-11 NOTE — PROGRESS NOTES
[facility]   Physical Therapy Treatment Note      Name: Joselin Phillips  Clinic Number: [id number]    Therapy Diagnosis:   Encounter Diagnoses   Name Primary?    Decreased range of motion of left ankle Yes    Impaired functional mobility, balance, gait, and endurance      Physician: Alexey Lauren DPM    Visit Date: 7/11/2023    Physician Orders: PT Eval and Treat   Medical Diagnosis from Referral:   M67.88 (ICD-10-CM) - Achilles tendinosis of left lower extremity   M25.572 (ICD-10-CM) - Pain of joint of left ankle and foot   Evaluation Date: 6/9/2023  Authorization Period Expiration: 5/14/2024  Plan of Care Expiration: 9/15/2023 or 20v post eval  Visit # (episodes) / Visits authorized:  8 / eval + 20  (POC 7 / 20)   2nd FOTO visit 5 - 6/29/23  3rd FOTO visit 20  Progress Note Due: 7/9/2023        Time In: 930  Time Out: 1025  Total Billable Time: 45 minutes     Precautions: CAM boot in community but released to transition into shoe; LBP hx  Insurance: Payor: [employer] / Plan: [employer] ([facility]) / Product Type: Commercial /     Subjective     Pt reports: doing good; not having any pain.     She was compliant with home exercise program.  Response to previous treatment: good  Functional change: improved gait pattern    Pain: 0/10  Location: Left achilles; incision region    Objective      Objective Measures updated at progress report unless specified.     Treatment     DOS: 3/2/2023  REPAIR, TENDON, LOWER EXTREMITY Left     EXCISION,YNES DEFORMITY,CALCANEUS Left     RESECTION, MUSCLE, GASTROCNEMIUS Left     Surgeon: Able to transition into shoe at this point; supposed to bring tennis shoe  [employer] employee    Thalia received the treatments listed below:      Thalia received therapeutic exercises to develop strength, endurance, ROM, and flexibility for 27 minutes including:  NMRE utilized to activate appropriate mm to improve balance, proprioception,  stability and gait skills: 28 minutes     Bike x 10 minutes - NOT PERFORMED     SEATED: with footstool   Ankle alphabet, 1 set    Gastroc strap stretch, 3x30s / DF Mobility 20x   Green Theraband Ankle 3-way AROM: DF / PF / EV, 30x ea, stabilize leg    Arch lifts, 30x5s  NMRE   Toe ext rolls, 5-4-3-2-1, 30x  NMRE - both directions 15x ea  DF Slideboard, 10x10s     STAND: all to tolerance  Tennis Ball Massage of medial arch, 2 minutes   DF/KF on stairs, 20x  HR/TR x 30  Forward step up 30x, stairs  Forward step downs, stairs, x 30   +Eccentric Heel raises, floor, 2x10 - c/o pn but very important for an achilles repair - instructed to ice after (add to HEP)  Slant board Gastroc stretch 3 x 30 sec Bilateral   INV/EV 1/2 foam, 30x  Single Leg Stance, Airex 3 x 30 sec  -  finger tips  Mini Squats, 2x10 Bilateral     NOT PERFORMED   Hip 3 way 2 x 10 Bilateral, standing on green disc   Pre-Gait train:     1. March with even weight distribution and no leaning, 1 min  2. Stagger: Affected fleg in front: Heel contact -> Flat foot with KE (mid stance), 30 sec    3. Stagger: Affected leg in back: Roll through -> push off, 30 sec     4. Ambulate Hallway, MIRROR - NOT PERFORMED   Shuttle: 50# Bilateral x 20  Shuttle: 50# heel raises x 20  Shuttle: 25# Left x 20    Manual tx: 0 minutes  Scar Mobilization: parallel and cross friction; smoothing; lifting  Myofascial Release with ART of gastroc - NOT PERFORMED   Elevated edema massage       Add NEXT visit:  FUTURE visit:  Single heel raises  Walking  Red Theraband lateral steps  Red Theraband Abduction       Patient Education and Home Exercises       Education provided:   - daily HEP  - ice post tx    Written Home Exercises Provided: Patient instructed to cont prior HEP. Exercises were reviewed and Thalia was able to demonstrate them prior to the end of the session.  Thalia demonstrated good  understanding of the education provided. See EMR under Patient Instructions for exercises  provided during therapy sessions    Assessment     Thalia demonstrates no pain upon arrival. Pain increased with eccentric gastroc strengthening; explained to pt eccentrics are critical to conditioning an achilles tendon that has been repaired. We will continue to progress her as her pain levels go down. Pain levels may increase, but will reduce with ice and repeat conditioning. Overall, making great progress. Continue to progress as tolerate for return to work.     Thalia Is progressing well towards her goals.   Pt prognosis is Good.     Pt will continue to benefit from skilled outpatient physical therapy to address the deficits listed in the problem list box on initial evaluation, provide pt/family education and to maximize pt's level of independence in the home and community environment.     Pt's spiritual, cultural and educational needs considered and pt agreeable to plan of care and goals.     Anticipated barriers to physical therapy: chronicity of condition    Goals:   Short Term Goals: 5 weeks   7/11/2023  ongoing  1. Pt will demonstrate compliance with HEP.  2. Pt will decrease swelling of ankle to 50.5 cm to reduce pain performing daily life functions.  3. Pt will increase DF ROM by 10 degrees to improve gait.      Long Term Goals: 10 weeks   7/11/2023  ongoing  1. Pt will demonstrate independence with HEP.  2. Pt will increase left ankle, knee and hip strength to 1/2 grade in order to increase WB tolerance.  3. Pt will improve FOTO limitation score to </= 27% to show improvement in condition and functional mobility.   4. Pt will be able to return to work with 1/10 pain levels and minimal edema.    Plan     PT as deemed per POC: ankle Range of Motion, strengthening, gait training    Tish Sumner, PT

## 2023-07-13 ENCOUNTER — CLINICAL SUPPORT (OUTPATIENT)
Dept: REHABILITATION | Facility: HOSPITAL | Age: 53
End: 2023-07-13

## 2023-07-13 DIAGNOSIS — M25.672 DECREASED RANGE OF MOTION OF LEFT ANKLE: Primary | ICD-10-CM

## 2023-07-13 DIAGNOSIS — Z74.09 IMPAIRED FUNCTIONAL MOBILITY, BALANCE, GAIT, AND ENDURANCE: ICD-10-CM

## 2023-07-13 PROCEDURE — 97110 THERAPEUTIC EXERCISES: CPT | Mod: PN,CQ

## 2023-07-13 PROCEDURE — 97112 NEUROMUSCULAR REEDUCATION: CPT | Mod: PN,CQ

## 2023-07-13 NOTE — PROGRESS NOTES
[facility]   Physical Therapy Treatment Note      Name: Joselin Phillips  Clinic Number: [id number]    Therapy Diagnosis:   Encounter Diagnoses   Name Primary?    Decreased range of motion of left ankle Yes    Impaired functional mobility, balance, gait, and endurance      Physician: Alexey Lauren DPM    Visit Date: 7/13/2023    Physician Orders: PT Eval and Treat   Medical Diagnosis from Referral:   M67.88 (ICD-10-CM) - Achilles tendinosis of left lower extremity   M25.572 (ICD-10-CM) - Pain of joint of left ankle and foot   Evaluation Date: 6/9/2023  Authorization Period Expiration: 5/14/2024  Plan of Care Expiration: 9/15/2023 or 20v post eval  Visit # (episodes) / Visits authorized:  9 / eval + 20  (POC 8 / 20)   2nd FOTO visit 5 - 6/29/23  3rd FOTO visit 20  Progress Note Due: 7/9/2023        Time In: 905  Time Out: 0958  Total Billable Time: 53 minutes     Precautions: CAM boot in community but released to transition into shoe; LBP hx  Insurance: Payor: [employer] / Plan: [employer] ([facility]) / Product Type: Commercial /     Subjective     Pt reports: doing well today    She was compliant with home exercise program.  Response to previous treatment: good  Functional change: improved gait pattern    Pain: 0/10  Location: Left achilles; incision region    Objective      Objective Measures updated at progress report unless specified.     Treatment     DOS: 3/2/2023  REPAIR, TENDON, LOWER EXTREMITY Left     EXCISION,YNES DEFORMITY,CALCANEUS Left     RESECTION, MUSCLE, GASTROCNEMIUS Left     Surgeon: Able to transition into shoe at this point; supposed to bring tennis shoe  [employer] employee    Thalia received the treatments listed below:      Thalia received therapeutic exercises to develop strength, endurance, ROM, and flexibility for 30 minutes including:  NMRE utilized to activate appropriate mm to improve balance, proprioception, stability and gait  "skills: 23 minutes     Bike x 10 minutes    +TM incline @ level 4, 1.0 mph x 3 minutes    SEATED: with footstool   NOT PERFORMED below  Ankle alphabet, 1 set    Gastroc strap stretch, 3x30s / DF Mobility 20x   Green Theraband Ankle 3-way AROM: DF / PF / EV, 30x ea, stabilize leg    Arch lifts, 30x5s  NMRE   Toe ext rolls, 5-4-3-2-1, 30x  NMRE - both directions 15x ea  DF Slideboard, 10x10s     STAND: all to tolerance  Tennis Ball Massage of medial arch, 2 minutes  NOT PERFORMED   DF/KF 8" step, 20x  HR/TR x 30 airex  +HR edge of low step x 20, emphasis to shift to LEFT  Forward step up x 24, 8"   Forward step downs, stairs, 6" x 20   +Eccentric Heel raises, floor, 2x10 - c/o pn but very important for an achilles repair - instructed to ice after (add to HEP)  NOT PERFORMED -painful  Slant board Gastroc stretch 3 x 30 sec Bilateral   INV/EV 1/2 foam, 30x  Single Leg Stance, Airex 3 x 30 sec  -  finger tips  Mini Squats, 2x10 Bilateral     NOT PERFORMED   Hip 3 way 2 x 10 Bilateral, standing on green disc   Pre-Gait train:     1. March with even weight distribution and no leaning, 1 min  2. Stagger: Affected fleg in front: Heel contact -> Flat foot with KE (mid stance), 30 sec    3. Stagger: Affected leg in back: Roll through -> push off, 30 sec     4. Ambulate Hallway, MIRROR - NOT PERFORMED   Shuttle: 50# Bilateral x 20  Shuttle: 50# heel raises x 20  Shuttle: 25# Left x 20    Manual tx: 0 minutes  Scar Mobilization: parallel and cross friction; smoothing; lifting  Myofascial Release with ART of gastroc - NOT PERFORMED   Elevated edema massage       Add NEXT visit:  FUTURE visit:  Single heel raises  Walking  Red Theraband lateral steps  Red Theraband Abduction       Patient Education and Home Exercises       Education provided:   - daily HEP  - ice post tx    Written Home Exercises Provided: Patient instructed to cont prior HEP. Exercises were reviewed and Thalia was able to demonstrate them prior to the end of the " session.  Thalia demonstrated good  understanding of the education provided. See EMR under Patient Instructions for exercises provided during therapy sessions    Assessment     Thalia presented with an improved gait pattern with a more even step length and decreased lateral shifting.  SOB with TM, Verbal cues needed to increase R LE step length and increase Bilateral knee extension.  Thalia was able to perform new challenges and PRE's with minimal pain provocation.     Thalia Is progressing well towards her goals.   Pt prognosis is Good.     Pt will continue to benefit from skilled outpatient physical therapy to address the deficits listed in the problem list box on initial evaluation, provide pt/family education and to maximize pt's level of independence in the home and community environment.     Pt's spiritual, cultural and educational needs considered and pt agreeable to plan of care and goals.     Anticipated barriers to physical therapy: chronicity of condition    Goals:   Short Term Goals: 5 weeks   7/13/2023  ongoing  1. Pt will demonstrate compliance with HEP.  2. Pt will decrease swelling of ankle to 50.5 cm to reduce pain performing daily life functions.  3. Pt will increase DF ROM by 10 degrees to improve gait.      Long Term Goals: 10 weeks   7/13/2023  ongoing  1. Pt will demonstrate independence with HEP.  2. Pt will increase left ankle, knee and hip strength to 1/2 grade in order to increase WB tolerance.  3. Pt will improve FOTO limitation score to </= 27% to show improvement in condition and functional mobility.   4. Pt will be able to return to work with 1/10 pain levels and minimal edema.    Plan     PT as deemed per POC: ankle Range of Motion, strengthening, gait training    Maria Sebastian, PTA

## 2023-07-13 NOTE — TELEPHONE ENCOUNTER
Spoke to patient to let her know I still do not have her paper work. Patient states she will contact her employer and bring the papers to the office.

## 2023-07-18 ENCOUNTER — CLINICAL SUPPORT (OUTPATIENT)
Dept: REHABILITATION | Facility: HOSPITAL | Age: 53
End: 2023-07-18

## 2023-07-18 DIAGNOSIS — Z74.09 IMPAIRED FUNCTIONAL MOBILITY, BALANCE, GAIT, AND ENDURANCE: ICD-10-CM

## 2023-07-18 DIAGNOSIS — M25.672 DECREASED RANGE OF MOTION OF LEFT ANKLE: Primary | ICD-10-CM

## 2023-07-18 PROCEDURE — 97110 THERAPEUTIC EXERCISES: CPT | Mod: PN

## 2023-07-18 PROCEDURE — 97140 MANUAL THERAPY 1/> REGIONS: CPT | Mod: PN

## 2023-07-18 PROCEDURE — 97112 NEUROMUSCULAR REEDUCATION: CPT | Mod: PN

## 2023-07-18 NOTE — PROGRESS NOTES
[facility]   Physical Therapy Treatment Note      Name: Joselin Phillips  Clinic Number: [id number]    Therapy Diagnosis:   Encounter Diagnoses   Name Primary?    Decreased range of motion of left ankle Yes    Impaired functional mobility, balance, gait, and endurance      Physician: Alexey Lauren DPM    Visit Date: 7/18/2023    Physician Orders: PT Eval and Treat   Medical Diagnosis from Referral:   M67.88 (ICD-10-CM) - Achilles tendinosis of left lower extremity   M25.572 (ICD-10-CM) - Pain of joint of left ankle and foot   Evaluation Date: 6/9/2023  Authorization Period Expiration: 5/14/2024  Plan of Care Expiration: 9/15/2023 or 20v post eval  Visit # (episodes) / Visits authorized:  10 / eval + 20  (POC 9 / 20)   2nd FOTO visit 5 - 6/29/23  3rd FOTO visit 20  Progress Note Due: 7/9/2023     Time In: 930  Time Out: 1026  Total Billable Time: 56 minutes     Precautions: CAM boot in community but released to transition into shoe; LBP hx  Insurance: Payor: [employer] / Plan: [employer] ([employer]) / Product Type: Commercial /     Subjective     Pt reports: a little pain here and there. No return to work date. F/u with Leonidas on 7/24, discuss return to work.     She was compliant with home exercise program.  Response to previous treatment: good  Functional change: improved gait pattern    Pain: 0/10  Location: Left achilles; incision region    Objective      Objective Measures updated at progress report unless specified.     Treatment     DOS: 3/2/2023  REPAIR, TENDON, LOWER EXTREMITY Left     EXCISION,YNES DEFORMITY,CALCANEUS Left     RESECTION, MUSCLE, GASTROCNEMIUS Left     Surgeon: Able to transition into shoe at this point; supposed to bring tennis shoe  [employer] employee    Thalia received the treatments listed below:      Thalia received therapeutic exercises to develop strength, endurance, ROM, and flexibility for 20 minutes including:  JUAN utilized  "to activate appropriate mm to improve balance, proprioception, stability and gait skills: 28 minutes     TM incline @ level 4, 1.3 mph x 5 minutes    SEATED: with footstool     Ankle alphabet, 1 set    Gastroc strap stretch, 3x30s / DF Mobility 20x   DF Slideboard, 10x10s     STAND:   DF/KF 8" step, 20x  HR/TR x 30 airex  INV/EV 1/2 foam, 30x  Alternate Eccentric Heel raises and Single Leg Heel Raises, 5x ea, 2 sets, floor (eccentrics given for HEP)  Forward step up x , 8"   +BOSU: WBOS balance, [id number]  BOSU Mini Squats, 2x10 Bilateral  Single Leg Stance, Airex 3 x 30 sec    Forward step downs, stairs, 6" x 20   Slant board Gastroc stretch 3 x 30 sec Bilateral - stairs today, just Left  +Alternate Lunges, in hallway, 2 hand support of walls, 1 set      NOT PERFORMED  HR edge of low step x 20, emphasis to shift to LEFT  Hip 3 way 2 x 10 Bilateral, standing on green disc   Pre-Gait train:     1. March with even weight distribution and no leaning, 1 min  2. Stagger: Affected fleg in front: Heel contact -> Flat foot with KE (mid stance), 30 sec    3. Stagger: Affected leg in back: Roll through -> push off, 30 sec     4. Ambulate Hallway, MIRROR - NOT PERFORMED   Shuttle: 50# Bilateral x 20  Shuttle: 50# heel raises x 20  Shuttle: 25# Left x 20    Manual tx: 0 minutes  Scar Mobilization: parallel and cross friction; smoothing; lifting  Myofascial Release with ART of gastroc - NOT PERFORMED   Elevated edema massage       Add NEXT visit:  FUTURE visit:  Red Theraband lateral steps  Red Theraband Abduction     D/C:  Green Theraband Ankle 3-way AROM: DF / PF / EV, 30x ea, stabilize leg     Arch lifts, 30x5s  NMRE     Toe ext rolls, 5-4-3-2-1, 30x  NMRE - both directions 15x ea - NOT PERFORMED   Tennis Ball Massage of medial arch, 2 minutes      Patient Education and Home Exercises       Education provided:   - daily HEP  - ice post tx    Written Home Exercises Provided: Patient instructed to cont prior HEP. Exercises were " reviewed and Thalia was able to demonstrate them prior to the end of the session.  Thalia demonstrated good  understanding of the education provided. See EMR under Patient Instructions for exercises provided during therapy sessions    Assessment     Thalia demonstrates low pain levels. Continues to have pockets of edema and myofascial restrictions in scar region. Utilized manual tx to help break up scar tissue. Eccentric heel raises continue to be painful b/c of scar tissue; discussed in length importance of performing these at home to optimize recovery, endurance and decrease gait deficits once she returns to work. Added unsteady surface to improve proprioception and challenge ankle stabilizers. Thalia was able to perform new challenges and PRE's with min to mod pain provocation.     Thalia Is progressing well towards her goals.   Pt prognosis is Good.     Pt will continue to benefit from skilled outpatient physical therapy to address the deficits listed in the problem list box on initial evaluation, provide pt/family education and to maximize pt's level of independence in the home and community environment.     Pt's spiritual, cultural and educational needs considered and pt agreeable to plan of care and goals.     Anticipated barriers to physical therapy: chronicity of condition    Goals:   Short Term Goals: 5 weeks   7/18/2023  ongoing  1. Pt will demonstrate compliance with HEP.  2. Pt will decrease swelling of ankle to 50.5 cm to reduce pain performing daily life functions.  3. Pt will increase DF ROM by 10 degrees to improve gait.      Long Term Goals: 10 weeks   7/18/2023  ongoing  1. Pt will demonstrate independence with HEP.  2. Pt will increase left ankle, knee and hip strength to 1/2 grade in order to increase WB tolerance.  3. Pt will improve FOTO limitation score to </= 27% to show improvement in condition and functional mobility.   4. Pt will be able to return to work with 1/10 pain levels and minimal  edema.    Plan     PT as deemed per POC: ankle Range of Motion, strengthening, gait training    Tish Sumner, PT

## 2023-07-24 ENCOUNTER — OFFICE VISIT (OUTPATIENT)
Dept: PODIATRY | Facility: CLINIC | Age: 53
End: 2023-07-24

## 2023-07-24 VITALS — WEIGHT: 195.75 LBS | RESPIRATION RATE: 16 BRPM | BODY MASS INDEX: 34.68 KG/M2 | HEIGHT: 63 IN

## 2023-07-24 DIAGNOSIS — M25.572 CHRONIC PAIN OF LEFT ANKLE: ICD-10-CM

## 2023-07-24 DIAGNOSIS — M67.88 ACHILLES TENDINOSIS OF LEFT LOWER EXTREMITY: Primary | ICD-10-CM

## 2023-07-24 DIAGNOSIS — G89.29 CHRONIC PAIN OF LEFT ANKLE: ICD-10-CM

## 2023-07-24 DIAGNOSIS — M25.572 PAIN OF JOINT OF LEFT ANKLE AND FOOT: ICD-10-CM

## 2023-07-24 DIAGNOSIS — M77.32 EXOSTOSIS OF LEFT POSTERIOR CALCANEUS: ICD-10-CM

## 2023-07-24 PROCEDURE — 3008F BODY MASS INDEX DOCD: CPT | Mod: CPTII,S$GLB,, | Performed by: PODIATRIST

## 2023-07-24 PROCEDURE — 1160F PR REVIEW ALL MEDS BY PRESCRIBER/CLIN PHARMACIST DOCUMENTED: ICD-10-PCS | Mod: CPTII,S$GLB,, | Performed by: PODIATRIST

## 2023-07-24 PROCEDURE — 1159F PR MEDICATION LIST DOCUMENTED IN MEDICAL RECORD: ICD-10-PCS | Mod: CPTII,S$GLB,, | Performed by: PODIATRIST

## 2023-07-24 PROCEDURE — 3008F PR BODY MASS INDEX (BMI) DOCUMENTED: ICD-10-PCS | Mod: CPTII,S$GLB,, | Performed by: PODIATRIST

## 2023-07-24 PROCEDURE — 99999 PR PBB SHADOW E&M-EST. PATIENT-LVL III: CPT | Mod: PBBFAC,,, | Performed by: PODIATRIST

## 2023-07-24 PROCEDURE — 99999 PR PBB SHADOW E&M-EST. PATIENT-LVL III: ICD-10-PCS | Mod: PBBFAC,,, | Performed by: PODIATRIST

## 2023-07-24 PROCEDURE — 1159F MED LIST DOCD IN RCRD: CPT | Mod: CPTII,S$GLB,, | Performed by: PODIATRIST

## 2023-07-24 PROCEDURE — 99213 OFFICE O/P EST LOW 20 MIN: CPT | Mod: S$GLB,,, | Performed by: PODIATRIST

## 2023-07-24 PROCEDURE — 1160F RVW MEDS BY RX/DR IN RCRD: CPT | Mod: CPTII,S$GLB,, | Performed by: PODIATRIST

## 2023-07-24 PROCEDURE — 99213 PR OFFICE/OUTPT VISIT, EST, LEVL III, 20-29 MIN: ICD-10-PCS | Mod: S$GLB,,, | Performed by: PODIATRIST

## 2023-07-24 NOTE — PROGRESS NOTES
"    [address] [facility] [address] [place] [address]  Phone: [phone]   [phone]    Patient's PCP:Orlando Mohna MD  Referring Provider:No ref. provider found    Subjective:      Chief Complaint: Post-op Evaluation (Repair of Achilles tendon left 2.  Resection of calcaneal exostosis left)      Date of Surgery: 3/2/23  Procedure: Post-op Evaluation (Repair of Achilles tendon left 2.  Resection of calcaneal exostosis left)    HPI:   Joselin Phillips is a 52 y.o. female who returns to the clinic today for her post-operative visit. Joselin Phillips rates pain a 2/10 on a pain scale. Compliance of Care:  Patient presents weight-bearing in normal shoe gear.  She states she has been doing home exercises.  States she has been increasing her activities without significant issue.  She states she does still get some pain and swelling at the surgical site after prolonged activity.    Vitals:    07/24/23 1110   Resp: 16   Weight: 88.8 kg (195 lb 12.3 oz)   Height: 5' 3" (1.6 m)   PainSc: 0-No pain        Past Surgical History:   Procedure Laterality Date    COLONOSCOPY N/A 09/21/2021    Procedure: COLONOSCOPY;  Surgeon: Zach Hemphill MD;  Location: [facility];  Service: Endoscopy;  Laterality: N/A;    CYSTOSCOPY N/A 10/28/2021    Procedure: CYSTOSCOPY;  Surgeon: Merlin Milligan MD;  Location: [facility] OR;  Service: OB/GYN;  Laterality: N/A;    EXCISION,YNES DEFORMITY,CALCANEUS Left 3/2/2023    Procedure: EXCISION,YNES DEFORMITY,CALCANEUS;  Surgeon: Alexey Lauren DPM;  Location: [facility] OR;  Service: Podiatry;  Laterality: Left;    FOOT SURGERY Left 2017    REPAIR OF TENDON OF LOWER EXTREMITY Left 3/2/2023    Procedure: REPAIR, TENDON, LOWER EXTREMITY;  Surgeon: Alexey Lauren DPM;  Location: [facility] OR;  Service: Podiatry;  Laterality: Left;    RESECTION OF GASTROCNEMIUS MUSCLE Left 3/2/2023    Procedure: RESECTION, MUSCLE, GASTROCNEMIUS;  Surgeon: Alexey Lauren DPM;  Location: [facility] OR;  Service: " Podiatry;  Laterality: Left;    ROBOT-ASSISTED LAPAROSCOPIC HYSTERECTOMY N/A 10/28/2021    Procedure: ROBOTIC HYSTERECTOMY;  Surgeon: Merlin Milligan MD;  Location: [facility] OR;  Service: OB/GYN;  Laterality: N/A;    ROBOT-ASSISTED LAPAROSCOPIC SALPINGO-OOPHORECTOMY Bilateral 10/28/2021    Procedure: ROBOTIC SALPINGO-OOPHORECTOMY;  Surgeon: Merlin Milligan MD;  Location: [facility] OR;  Service: OB/GYN;  Laterality: Bilateral;    TUBAL LIGATION Bilateral 10/29/2022     Past Medical History:   Diagnosis Date    Asthma     childhood-since resolved.    Foot pain     GERD (gastroesophageal reflux disease)     Ovarian mass     Pelvic mass     Pelvic pain     Plantar fasciitis      Family History   Problem Relation Age of Onset    Arthritis Mother     Heart disease Father     Hypertension Father     Kidney disease Father     Colon cancer Neg Hx     Breast cancer Neg Hx     Ovarian cancer Neg Hx         Social History:   Marital Status:   Alcohol History:  reports that she does not currently use alcohol after a past usage of about 1.0 standard drink per week.  Tobacco History:  reports that she has never smoked. She has never used smokeless tobacco.  Drug History:  reports no history of drug use.    Review of patient's allergies indicates:  No Known Allergies    Current Outpatient Medications   Medication Sig Dispense Refill    HYDROcodone-acetaminophen (NORCO)  mg per tablet Take 1 tablet by mouth every 6 (six) hours as needed for Pain. 30 tablet 0    naproxen (NAPROSYN) 250 MG tablet Take 250 mg by mouth 2 (two) times daily.      ondansetron (ZOFRAN-ODT) 4 MG TbDL Take 2 tablets (8 mg total) by mouth every 8 (eight) hours as needed (nausea). 30 tablet 0    traMADoL (ULTRAM) 50 mg tablet Take 1 tablet (50 mg total) by mouth every 6 (six) hours as needed for Pain. 28 tablet 0     No current facility-administered medications for this visit.       Review of Systems   Constitutional:  Negative for chills, fatigue,  fever and unexpected weight change.   HENT:  Negative for hearing loss and trouble swallowing.    Eyes:  Negative for photophobia and visual disturbance.   Respiratory:  Negative for cough, shortness of breath and wheezing.    Cardiovascular:  Negative for chest pain, palpitations and leg swelling.   Gastrointestinal:  Negative for abdominal pain and nausea.   Genitourinary:  Negative for dysuria and frequency.   Musculoskeletal:  Positive for joint swelling. Negative for arthralgias, back pain, gait problem and myalgias.   Skin:  Negative for rash and wound.   Neurological:  Negative for tremors, seizures, weakness and headaches.   Hematological:  Does not bruise/bleed easily.       Objective:        Post-op surgery of the left foot and ankle with normal healing, no signs of infection or dehiscence of wound. Normal post op exam today. No redness, no drainage, no increase in local temperature, no significant swelling, surgical incision is fully healed.  Full range of motion is present.  Muscle strength 4+/5.  No pain or crepitus with range of motion.    Imaging: none     Physical Exam:   Foot Exam  Physical Exam       Assessment:       1. Achilles tendinosis of left lower extremity    2. Pain of joint of left ankle and foot    3. Exostosis of left posterior calcaneus    4. Chronic pain of left ankle      Plan:   Achilles tendinosis of left lower extremity    Pain of joint of left ankle and foot    Exostosis of left posterior calcaneus    Chronic pain of left ankle      Follow up in about 4 weeks (around 8/21/2023), or if symptoms worsen or fail to improve.    Procedures - None    Patient has made great progress since her last visit.  At this time I have no restrictions from a shoe gear or activity standpoint.  I did discuss with her that she will still see some improvement in her strength and her swelling will also decrease.  Encouraged her to continue to work on increasing her activities over the next 4 weeks.  She  will follow-up in 4 weeks for further evaluation.  If she is doing well at this time then she will be released back to work without restriction.    This note was created using Dragon voice recognition software that occasionally misinterpreted phrases or words.

## 2023-08-07 ENCOUNTER — PATIENT MESSAGE (OUTPATIENT)
Dept: PODIATRY | Facility: CLINIC | Age: 53
End: 2023-08-07

## 2023-08-08 ENCOUNTER — TELEPHONE (OUTPATIENT)
Dept: PODIATRY | Facility: CLINIC | Age: 53
End: 2023-08-08

## 2023-08-21 ENCOUNTER — OFFICE VISIT (OUTPATIENT)
Dept: PODIATRY | Facility: CLINIC | Age: 53
End: 2023-08-21

## 2023-08-21 VITALS — WEIGHT: 198 LBS | HEART RATE: 91 BPM | BODY MASS INDEX: 35.07 KG/M2 | OXYGEN SATURATION: 97 %

## 2023-08-21 DIAGNOSIS — M77.32 EXOSTOSIS OF LEFT POSTERIOR CALCANEUS: ICD-10-CM

## 2023-08-21 DIAGNOSIS — M67.88 ACHILLES TENDINOSIS OF LEFT LOWER EXTREMITY: Primary | ICD-10-CM

## 2023-08-21 DIAGNOSIS — M25.572 CHRONIC PAIN OF LEFT ANKLE: ICD-10-CM

## 2023-08-21 DIAGNOSIS — G89.29 CHRONIC PAIN OF LEFT ANKLE: ICD-10-CM

## 2023-08-21 PROCEDURE — 99999 PR PBB SHADOW E&M-EST. PATIENT-LVL III: CPT | Mod: PBBFAC,,, | Performed by: PODIATRIST

## 2023-08-21 PROCEDURE — 99213 OFFICE O/P EST LOW 20 MIN: CPT | Mod: S$PBB,,, | Performed by: PODIATRIST

## 2023-08-21 PROCEDURE — 99999 PR PBB SHADOW E&M-EST. PATIENT-LVL III: ICD-10-PCS | Mod: PBBFAC,,, | Performed by: PODIATRIST

## 2023-08-21 PROCEDURE — 99213 OFFICE O/P EST LOW 20 MIN: CPT | Mod: PBBFAC,PN | Performed by: PODIATRIST

## 2023-08-21 PROCEDURE — 99213 PR OFFICE/OUTPT VISIT, EST, LEVL III, 20-29 MIN: ICD-10-PCS | Mod: S$PBB,,, | Performed by: PODIATRIST

## 2023-08-21 NOTE — PROGRESS NOTES
[address] [facility] [address]  [place] [address]  Phone: [phone]   [phone]    Patient's PCP:Orlando Mohan MD  Referring Provider: No ref. provider found    Subjective:      Chief Complaint:: Follow-up (Achilles tendinosis of left lower extremity)    SATISH Phillips is a 52 y.o. female who presents today with a follow up on Achilles tendinosis of left lower extremity. Patient presents weight-bearing in normal shoe gear.  She states she has been doing home exercises. Still having some pain and swelling.  She states pain is worse at the end of the day after being on her feet at work.  She states she is taking some brace throughout the day.  Unfortunately, because of insurance issue she had to discontinue physical therapy.  She states she does believe that she will be able to resume this in the near future.      Vitals:    08/21/23 1052   Pulse: 91   SpO2: 97%   Weight: 89.8 kg (197 lb 15.6 oz)   PainSc:   7      Shoe Size: 9.5    Past Surgical History:   Procedure Laterality Date    COLONOSCOPY N/A 09/21/2021    Procedure: COLONOSCOPY;  Surgeon: Zach Hemphill MD;  Location: [facility];  Service: Endoscopy;  Laterality: N/A;    CYSTOSCOPY N/A 10/28/2021    Procedure: CYSTOSCOPY;  Surgeon: Merlin Milligan MD;  Location: [facility] OR;  Service: OB/GYN;  Laterality: N/A;    EXCISION,YNES DEFORMITY,CALCANEUS Left 3/2/2023    Procedure: EXCISION,YNES DEFORMITY,CALCANEUS;  Surgeon: Alexey Lauren DPM;  Location: [facility] OR;  Service: Podiatry;  Laterality: Left;    FOOT SURGERY Left 2017    REPAIR OF TENDON OF LOWER EXTREMITY Left 3/2/2023    Procedure: REPAIR, TENDON, LOWER EXTREMITY;  Surgeon: Alexey Lauren DPM;  Location: [facility] OR;  Service: Podiatry;  Laterality: Left;    RESECTION OF GASTROCNEMIUS MUSCLE Left 3/2/2023    Procedure: RESECTION, MUSCLE, GASTROCNEMIUS;  Surgeon: Alexey Lauren DPM;  Location: [facility] OR;  Service: Podiatry;  Laterality: Left;    ROBOT-ASSISTED LAPAROSCOPIC  HYSTERECTOMY N/A 10/28/2021    Procedure: ROBOTIC HYSTERECTOMY;  Surgeon: Merlin Milligan MD;  Location: [facility] OR;  Service: OB/GYN;  Laterality: N/A;    ROBOT-ASSISTED LAPAROSCOPIC SALPINGO-OOPHORECTOMY Bilateral 10/28/2021    Procedure: ROBOTIC SALPINGO-OOPHORECTOMY;  Surgeon: Merlin Milligan MD;  Location: [facility] OR;  Service: OB/GYN;  Laterality: Bilateral;    TUBAL LIGATION Bilateral 10/29/2022     Past Medical History:   Diagnosis Date    Asthma     childhood-since resolved.    Foot pain     GERD (gastroesophageal reflux disease)     Ovarian mass     Pelvic mass     Pelvic pain     Plantar fasciitis      Family History   Problem Relation Age of Onset    Arthritis Mother     Heart disease Father     Hypertension Father     Kidney disease Father     Colon cancer Neg Hx     Breast cancer Neg Hx     Ovarian cancer Neg Hx         Social History:   Marital Status:   Alcohol History:  reports that she does not currently use alcohol after a past usage of about 1.0 standard drink of alcohol per week.  Tobacco History:  reports that she has never smoked. She has never used smokeless tobacco.  Drug History:  reports no history of drug use.    Review of patient's allergies indicates:  No Known Allergies    Current Outpatient Medications   Medication Sig Dispense Refill    naproxen (NAPROSYN) 250 MG tablet Take 250 mg by mouth 2 (two) times daily.      traMADoL (ULTRAM) 50 mg tablet Take 1 tablet (50 mg total) by mouth every 6 (six) hours as needed for Pain. 28 tablet 0    HYDROcodone-acetaminophen (NORCO)  mg per tablet Take 1 tablet by mouth every 6 (six) hours as needed for Pain. (Patient not taking: Reported on 8/21/2023) 30 tablet 0    ondansetron (ZOFRAN-ODT) 4 MG TbDL Take 2 tablets (8 mg total) by mouth every 8 (eight) hours as needed (nausea). (Patient not taking: Reported on 8/21/2023) 30 tablet 0     No current facility-administered medications for this visit.       Review of Systems    Constitutional:  Negative for chills, fatigue, fever and unexpected weight change.   HENT:  Negative for hearing loss and trouble swallowing.    Eyes:  Negative for photophobia and visual disturbance.   Respiratory:  Negative for cough, shortness of breath and wheezing.    Cardiovascular:  Negative for chest pain, palpitations and leg swelling.   Gastrointestinal:  Negative for abdominal pain and nausea.   Genitourinary:  Negative for dysuria and frequency.   Musculoskeletal:  Positive for myalgias. Negative for arthralgias, back pain and gait problem.   Skin:  Negative for rash and wound.   Neurological:  Negative for tremors, seizures, weakness and headaches.   Hematological:  Does not bruise/bleed easily.         Objective:        Physical Exam:   Foot Exam    General  General Appearance: appears stated age and healthy   Orientation: alert and oriented to person, place, and time   Affect: appropriate   Gait: unimpaired       Left Foot/Ankle      Inspection and Palpation  Ecchymosis: none  Tenderness: calcaneus tenderness (Mild tenderness distal Achilles)  Swelling: none   Arch: pes planus  Hammertoes: absent  Claw toes: absent  Skin Exam: skin intact;   Neurovascular  Dorsalis pedis: 2+  Posterior tibial: 2+  Capillary refill: 2+  Saphenous nerve sensation: normal  Tibial nerve sensation: normal  Superficial peroneal nerve sensation: normal  Deep peroneal nerve sensation: normal  Sural nerve sensation: normal    Edema  Type of edema: non-pitting    Muscle Strength  Ankle dorsiflexion: 5  Ankle plantar flexion: 4+  Ankle inversion: 5  Ankle eversion: 5  Great toe extension: 5  Great toe flexion: 5    Range of Motion    Passive  Ankle dorsiflexion: 5      Tests  Anterior drawer: negative   Calcaneal squeeze: negative   Talar tilt: negative   PT Tinel's sign: negative  Paresthesia: negative  Comments  Surgical incision fully heal    Physical Exam  Cardiovascular:      Pulses:           Dorsalis pedis pulses are 2+  on the left side.        Posterior tibial pulses are 2+ on the left side.               Left Ankle/Foot Exam     Comments:  Surgical incision fully heal      Muscle Strength   Left Lower Extremity   Ankle Dorsiflexion:  5   Plantar flexion:  4+/5     Vascular Exam       Left Pulses  Dorsalis Pedis:      2+  Posterior Tibial:      2+           Imaging: none            Assessment:       1. Achilles tendinosis of left lower extremity    2. Exostosis of left posterior calcaneus    3. Chronic pain of left ankle      Plan:   Achilles tendinosis of left lower extremity    Exostosis of left posterior calcaneus    Chronic pain of left ankle      Follow up in about 4 weeks (around 9/18/2023), or if symptoms worsen or fail to improve.    Procedures        I discussed with the patient that it is somewhat normal to have some pain and swelling as she does continue to increase her activities and get fully back to work.  However I did review with her proper shoe gear.  I explained that the flat she is wearing around the house are not good for her Achilles.  I also recommend that she add an arch support or heel lift to the shoes that she is wearing during the workday.  I am going to give her a note to allow for breaks as needed.  Also, instructed the patient to let me know once she has her insurance back and I will place an updated referral to physical therapy for her.    Counseling:     I provided patient education verbally regarding:   Patient diagnosis, treatment options, as well as alternatives, risks, and benefits.     This note was created using Dragon voice recognition software that occasionally misinterpreted phrases or words.

## 2023-08-21 NOTE — LETTER
August 21, 2023      [facility]  [address], [place] [address]  Phone: [phone]  Fax: [phone]       Patient: Joselin Phillips   YOB: 1970  Date of Visit: 08/21/2023    To Whom It May Concern:    Becca Phillips  was at [facility] on 08/21/2023. The patient may return to work on 8/21/2023 with restrictions. Please allow to rest and sit a needed. If you have any questions or concerns, or if I can be of further assistance, please do not hesitate to contact me.    Sincerely,  Electronically Signed by: JEAN Gant LPN

## 2023-09-15 ENCOUNTER — PATIENT MESSAGE (OUTPATIENT)
Dept: PODIATRY | Facility: CLINIC | Age: 53
End: 2023-09-15

## 2023-10-26 ENCOUNTER — PATIENT MESSAGE (OUTPATIENT)
Dept: GASTROENTEROLOGY | Facility: CLINIC | Age: 53
End: 2023-10-26

## 2023-12-06 DIAGNOSIS — Z12.31 OTHER SCREENING MAMMOGRAM: ICD-10-CM

## 2024-01-24 ENCOUNTER — OFFICE VISIT (OUTPATIENT)
Dept: FAMILY MEDICINE | Facility: CLINIC | Age: 54
End: 2024-01-24
Payer: COMMERCIAL

## 2024-01-24 VITALS
BODY MASS INDEX: 35.62 KG/M2 | OXYGEN SATURATION: 99 % | HEIGHT: 63 IN | SYSTOLIC BLOOD PRESSURE: 100 MMHG | WEIGHT: 201.06 LBS | HEART RATE: 101 BPM | TEMPERATURE: 98 F | DIASTOLIC BLOOD PRESSURE: 60 MMHG

## 2024-01-24 DIAGNOSIS — U07.1 COVID-19: Primary | ICD-10-CM

## 2024-01-24 LAB
CTP QC/QA: YES
SARS-COV-2 RDRP RESP QL NAA+PROBE: POSITIVE

## 2024-01-24 PROCEDURE — 1160F RVW MEDS BY RX/DR IN RCRD: CPT | Mod: CPTII,S$GLB,, | Performed by: FAMILY MEDICINE

## 2024-01-24 PROCEDURE — 3008F BODY MASS INDEX DOCD: CPT | Mod: CPTII,S$GLB,, | Performed by: FAMILY MEDICINE

## 2024-01-24 PROCEDURE — 1159F MED LIST DOCD IN RCRD: CPT | Mod: CPTII,S$GLB,, | Performed by: FAMILY MEDICINE

## 2024-01-24 PROCEDURE — 99213 OFFICE O/P EST LOW 20 MIN: CPT | Mod: S$GLB,,, | Performed by: FAMILY MEDICINE

## 2024-01-24 PROCEDURE — 3074F SYST BP LT 130 MM HG: CPT | Mod: CPTII,S$GLB,, | Performed by: FAMILY MEDICINE

## 2024-01-24 PROCEDURE — 99999 PR PBB SHADOW E&M-EST. PATIENT-LVL IV: CPT | Mod: PBBFAC,,, | Performed by: FAMILY MEDICINE

## 2024-01-24 PROCEDURE — 3078F DIAST BP <80 MM HG: CPT | Mod: CPTII,S$GLB,, | Performed by: FAMILY MEDICINE

## 2024-01-24 PROCEDURE — 87635 SARS-COV-2 COVID-19 AMP PRB: CPT | Mod: QW,S$GLB,, | Performed by: FAMILY MEDICINE

## 2024-01-24 NOTE — PATIENT INSTRUCTIONS
Wear a mask for the next five days.    COVID vitamins:  Vitamin C 1,000 mg three times a day  Zinc 50 mg twice a day  Vitamin D3 5,000 IU once a day  (you should take this for life as well)    Aspirin 81 mg a day (clot prevention) - 2 weeks    Push fluid intake - plenty of water.     Get original, behind the counter Sudafed - 30 mg and use as needed for congestion (ear/nose/sinus) up to three times a day.     Take both Benadryl 25 mg at bedtime and either Zyrtec or Claritin once a day in AM.    Contact your PCP if any worsening or for any new concerns as we discussed.

## 2024-01-24 NOTE — PROGRESS NOTES
Subjective:       Patient ID: Joselin Phillips is a 53 y.o. female.    Chief Complaint: No chief complaint on file.    New to me patient here for UC visit.  6-7 days ago onset of URI symptoms; congestion, runny nose, cough.  No SOB or pleuritic pain or fever.  Cough has resolved.  Congestion and rhinorrhea remain the chief symptoms.  Flu negative and Covid positive here today.  Risk score is 1      Review of Systems   Constitutional:  Negative for fever.   HENT:  Positive for congestion, rhinorrhea and sneezing.    Respiratory:  Negative for shortness of breath.    Cardiovascular:  Negative for chest pain.   Gastrointestinal:  Negative for abdominal pain and nausea.   Skin:  Negative for rash.   All other systems reviewed and are negative.      Objective:      Physical Exam  Vitals reviewed.   Constitutional:       General: She is not in acute distress.     Comments: Televisit - pt is COVID positive.   HENT:      Nose: Congestion present.      Mouth/Throat:      Comments: Nml voice/speech  Pulmonary:      Effort: No respiratory distress.   Neurological:      Comments: Alert and interactive   Psychiatric:         Mood and Affect: Mood normal.         Thought Content: Thought content normal.         Assessment:       1. COVID-19        Plan:       COVID-19  -     POCT COVID-19 Rapid Screening      Patient Instructions   Wear a mask for the next five days.    COVID vitamins:  Vitamin C 1,000 mg three times a day  Zinc 50 mg twice a day  Vitamin D3 5,000 IU once a day  (you should take this for life as well)    Aspirin 81 mg a day (clot prevention) - 2 weeks    Push fluid intake - plenty of water.     Get original, behind the counter Sudafed - 30 mg and use as needed for congestion (ear/nose/sinus) up to three times a day.     Take both Benadryl 25 mg at bedtime and either Zyrtec or Claritin once a day in AM.    Contact your PCP if any worsening or for any new concerns as we discussed.

## 2024-02-01 ENCOUNTER — PATIENT MESSAGE (OUTPATIENT)
Dept: ADMINISTRATIVE | Facility: OTHER | Age: 54
End: 2024-02-01
Payer: COMMERCIAL

## 2024-02-15 ENCOUNTER — LAB VISIT (OUTPATIENT)
Dept: LAB | Facility: HOSPITAL | Age: 54
End: 2024-02-15
Attending: STUDENT IN AN ORGANIZED HEALTH CARE EDUCATION/TRAINING PROGRAM
Payer: COMMERCIAL

## 2024-02-15 ENCOUNTER — OFFICE VISIT (OUTPATIENT)
Dept: FAMILY MEDICINE | Facility: CLINIC | Age: 54
End: 2024-02-15
Payer: COMMERCIAL

## 2024-02-15 VITALS
WEIGHT: 195.31 LBS | OXYGEN SATURATION: 98 % | DIASTOLIC BLOOD PRESSURE: 82 MMHG | BODY MASS INDEX: 34.61 KG/M2 | TEMPERATURE: 98 F | RESPIRATION RATE: 16 BRPM | HEART RATE: 93 BPM | SYSTOLIC BLOOD PRESSURE: 132 MMHG | HEIGHT: 63 IN

## 2024-02-15 DIAGNOSIS — E66.9 OBESITY (BMI 30-39.9): ICD-10-CM

## 2024-02-15 DIAGNOSIS — Z00.00 HEALTHCARE MAINTENANCE: ICD-10-CM

## 2024-02-15 DIAGNOSIS — Z13.1 SCREENING FOR DIABETES MELLITUS: ICD-10-CM

## 2024-02-15 DIAGNOSIS — M54.50 LUMBAR BACK PAIN: ICD-10-CM

## 2024-02-15 DIAGNOSIS — J30.2 SEASONAL ALLERGIES: ICD-10-CM

## 2024-02-15 DIAGNOSIS — E78.49 OTHER HYPERLIPIDEMIA: ICD-10-CM

## 2024-02-15 DIAGNOSIS — Z00.00 ANNUAL PHYSICAL EXAM: Primary | ICD-10-CM

## 2024-02-15 DIAGNOSIS — E78.2 MIXED HYPERLIPIDEMIA: ICD-10-CM

## 2024-02-15 DIAGNOSIS — Z12.31 ENCOUNTER FOR SCREENING MAMMOGRAM FOR MALIGNANT NEOPLASM OF BREAST: ICD-10-CM

## 2024-02-15 LAB
ALBUMIN SERPL BCP-MCNC: 3.6 G/DL (ref 3.5–5.2)
ALP SERPL-CCNC: 76 U/L (ref 55–135)
ALT SERPL W/O P-5'-P-CCNC: 14 U/L (ref 10–44)
ANION GAP SERPL CALC-SCNC: 9 MMOL/L (ref 8–16)
AST SERPL-CCNC: 23 U/L (ref 10–40)
BASOPHILS # BLD AUTO: 0.08 K/UL (ref 0–0.2)
BASOPHILS NFR BLD: 1.2 % (ref 0–1.9)
BILIRUB SERPL-MCNC: 0.8 MG/DL (ref 0.1–1)
BUN SERPL-MCNC: 12 MG/DL (ref 6–20)
CALCIUM SERPL-MCNC: 9.8 MG/DL (ref 8.7–10.5)
CHLORIDE SERPL-SCNC: 106 MMOL/L (ref 95–110)
CHOLEST SERPL-MCNC: 204 MG/DL (ref 120–199)
CHOLEST/HDLC SERPL: 3.4 {RATIO} (ref 2–5)
CO2 SERPL-SCNC: 26 MMOL/L (ref 23–29)
CREAT SERPL-MCNC: 0.7 MG/DL (ref 0.5–1.4)
DIFFERENTIAL METHOD BLD: ABNORMAL
EOSINOPHIL # BLD AUTO: 0.2 K/UL (ref 0–0.5)
EOSINOPHIL NFR BLD: 2.8 % (ref 0–8)
ERYTHROCYTE [DISTWIDTH] IN BLOOD BY AUTOMATED COUNT: 14.2 % (ref 11.5–14.5)
EST. GFR  (NO RACE VARIABLE): >60 ML/MIN/1.73 M^2
ESTIMATED AVG GLUCOSE: 131 MG/DL (ref 68–131)
GLUCOSE SERPL-MCNC: 99 MG/DL (ref 70–110)
HBA1C MFR BLD: 6.2 % (ref 4–5.6)
HCT VFR BLD AUTO: 38.2 % (ref 37–48.5)
HDLC SERPL-MCNC: 60 MG/DL (ref 40–75)
HDLC SERPL: 29.4 % (ref 20–50)
HGB BLD-MCNC: 12.2 G/DL (ref 12–16)
IMM GRANULOCYTES # BLD AUTO: 0.01 K/UL (ref 0–0.04)
IMM GRANULOCYTES NFR BLD AUTO: 0.1 % (ref 0–0.5)
LDLC SERPL CALC-MCNC: 132 MG/DL (ref 63–159)
LYMPHOCYTES # BLD AUTO: 2.9 K/UL (ref 1–4.8)
LYMPHOCYTES NFR BLD: 41.9 % (ref 18–48)
MCH RBC QN AUTO: 28.3 PG (ref 27–31)
MCHC RBC AUTO-ENTMCNC: 31.9 G/DL (ref 32–36)
MCV RBC AUTO: 89 FL (ref 82–98)
MONOCYTES # BLD AUTO: 0.8 K/UL (ref 0.3–1)
MONOCYTES NFR BLD: 10.9 % (ref 4–15)
NEUTROPHILS # BLD AUTO: 3 K/UL (ref 1.8–7.7)
NEUTROPHILS NFR BLD: 43.1 % (ref 38–73)
NONHDLC SERPL-MCNC: 144 MG/DL
NRBC BLD-RTO: 0 /100 WBC
PLATELET # BLD AUTO: 334 K/UL (ref 150–450)
PMV BLD AUTO: 10.8 FL (ref 9.2–12.9)
POTASSIUM SERPL-SCNC: 3.7 MMOL/L (ref 3.5–5.1)
PROT SERPL-MCNC: 7.8 G/DL (ref 6–8.4)
RBC # BLD AUTO: 4.31 M/UL (ref 4–5.4)
SODIUM SERPL-SCNC: 141 MMOL/L (ref 136–145)
TRIGL SERPL-MCNC: 60 MG/DL (ref 30–150)
TSH SERPL DL<=0.005 MIU/L-ACNC: 0.62 UIU/ML (ref 0.4–4)
WBC # BLD AUTO: 6.9 K/UL (ref 3.9–12.7)

## 2024-02-15 PROCEDURE — 3008F BODY MASS INDEX DOCD: CPT | Mod: CPTII,S$GLB,,

## 2024-02-15 PROCEDURE — 3079F DIAST BP 80-89 MM HG: CPT | Mod: CPTII,S$GLB,,

## 2024-02-15 PROCEDURE — 85025 COMPLETE CBC W/AUTO DIFF WBC: CPT

## 2024-02-15 PROCEDURE — 84443 ASSAY THYROID STIM HORMONE: CPT | Performed by: STUDENT IN AN ORGANIZED HEALTH CARE EDUCATION/TRAINING PROGRAM

## 2024-02-15 PROCEDURE — 36415 COLL VENOUS BLD VENIPUNCTURE: CPT | Mod: PO | Performed by: STUDENT IN AN ORGANIZED HEALTH CARE EDUCATION/TRAINING PROGRAM

## 2024-02-15 PROCEDURE — 1159F MED LIST DOCD IN RCRD: CPT | Mod: CPTII,S$GLB,,

## 2024-02-15 PROCEDURE — 3075F SYST BP GE 130 - 139MM HG: CPT | Mod: CPTII,S$GLB,,

## 2024-02-15 PROCEDURE — 99214 OFFICE O/P EST MOD 30 MIN: CPT | Mod: S$GLB,,,

## 2024-02-15 PROCEDURE — 1160F RVW MEDS BY RX/DR IN RCRD: CPT | Mod: CPTII,S$GLB,,

## 2024-02-15 PROCEDURE — 99999 PR PBB SHADOW E&M-EST. PATIENT-LVL IV: CPT | Mod: PBBFAC,,,

## 2024-02-15 PROCEDURE — 80061 LIPID PANEL: CPT | Performed by: STUDENT IN AN ORGANIZED HEALTH CARE EDUCATION/TRAINING PROGRAM

## 2024-02-15 PROCEDURE — 80053 COMPREHEN METABOLIC PANEL: CPT | Performed by: STUDENT IN AN ORGANIZED HEALTH CARE EDUCATION/TRAINING PROGRAM

## 2024-02-15 PROCEDURE — 83036 HEMOGLOBIN GLYCOSYLATED A1C: CPT

## 2024-02-15 RX ORDER — MELOXICAM 7.5 MG/1
7.5 TABLET ORAL DAILY
Qty: 30 TABLET | Refills: 3 | Status: SHIPPED | OUTPATIENT
Start: 2024-02-15 | End: 2024-02-22 | Stop reason: SDUPTHER

## 2024-02-15 RX ORDER — LEVOCETIRIZINE DIHYDROCHLORIDE 5 MG/1
5 TABLET, FILM COATED ORAL NIGHTLY
Qty: 30 TABLET | Refills: 11 | Status: SHIPPED | OUTPATIENT
Start: 2024-02-15 | End: 2024-02-22 | Stop reason: SDUPTHER

## 2024-02-15 NOTE — PROGRESS NOTES
Subjective:       Patient ID: Joselin Phillips is a 53 y.o. female.    Chief Complaint: Annual Exam    Patient presents to the clinic for her annual exam.     Patient Active Problem List:     Plantar fasciitis     Left foot pain     Equinus contracture of ankle     Obesity (BMI 30-39.9)     Plantar fasciitis, left     Screening for colon cancer     Fibroid     Mixed hyperlipidemia     Decreased range of motion of left ankle     Impaired functional mobility, balance, gait, and endurance    Mammogram- Due- will order  Colonoscopy- 2021- Repeat in 5 years- Due 2026    Reports chronic low back pain and right groin pain. Takes Naproxen as needed.     Reports weight gain-   Wt Readings from Last 3 Encounters:  02/15/24 0826 : 88.6 kg (195 lb 5.2 oz)  01/24/24 1025 : 91.2 kg (201 lb 1 oz)  08/21/23 1052 : 89.8 kg (197 lb 15.6 oz)    Has no other complaints or concerns today.     Patient educated on plan of care, verbalized understanding.           Review of Systems   Constitutional:  Positive for unexpected weight change. Negative for activity change, appetite change, chills, diaphoresis and fever.   HENT:  Negative for congestion, ear pain, postnasal drip, sinus pressure, sneezing and sore throat.    Eyes:  Negative for pain, discharge, redness and itching.   Respiratory:  Negative for apnea, cough, chest tightness, shortness of breath and wheezing.    Cardiovascular:  Negative for chest pain and leg swelling.   Gastrointestinal:  Negative for abdominal distention, abdominal pain, constipation, diarrhea, nausea and vomiting.   Genitourinary:  Negative for difficulty urinating, dysuria, flank pain and frequency.   Musculoskeletal:  Positive for arthralgias, back pain and myalgias.   Skin:  Negative for color change, rash and wound.   Neurological:  Negative for dizziness.   All other systems reviewed and are negative.      Patient Active Problem List   Diagnosis    Plantar fasciitis    Left foot pain    Equinus  contracture of ankle    Obesity (BMI 30-39.9)    Plantar fasciitis, left    Screening for colon cancer    Fibroid    Mixed hyperlipidemia    Decreased range of motion of left ankle    Impaired functional mobility, balance, gait, and endurance       Objective:      Physical Exam  Vitals and nursing note reviewed.   Constitutional:       General: She is not in acute distress.     Appearance: Normal appearance. She is well-developed.   HENT:      Head: Normocephalic.      Nose: Nose normal.   Eyes:      Conjunctiva/sclera: Conjunctivae normal.      Pupils: Pupils are equal, round, and reactive to light.   Cardiovascular:      Rate and Rhythm: Normal rate and regular rhythm.      Heart sounds: Normal heart sounds.   Pulmonary:      Effort: Pulmonary effort is normal. No respiratory distress.      Breath sounds: Normal breath sounds.   Musculoskeletal:      Cervical back: Normal range of motion and neck supple.   Skin:     General: Skin is warm and dry.      Findings: No rash.   Neurological:      Mental Status: She is alert and oriented to person, place, and time.   Psychiatric:         Behavior: Behavior normal.         Lab Results   Component Value Date    WBC 8.91 02/28/2023    HGB 12.1 02/28/2023    HCT 37.6 02/28/2023     02/28/2023    CHOL 219 (H) 07/22/2022    TRIG 122 07/22/2022    HDL 68 07/22/2022    ALT 16 02/28/2023    AST 23 02/28/2023     02/28/2023    K 4.0 02/28/2023     02/28/2023    CREATININE 0.7 02/28/2023    BUN 13 02/28/2023    CO2 28 02/28/2023    TSH 0.754 07/22/2022    HGBA1C 5.9 (H) 07/22/2022     The 10-year ASCVD risk score (Abdi DAVID, et al., 2019) is: 3.2%    Values used to calculate the score:      Age: 53 years      Sex: Female      Is Non- : Yes      Diabetic: No      Tobacco smoker: No      Systolic Blood Pressure: 132 mmHg      Is BP treated: No      HDL Cholesterol: 68 mg/dL      Total Cholesterol: 219 mg/dL  Visit Vitals  /82 (BP  "Location: Right arm, Patient Position: Sitting, BP Method: Large (Manual))   Pulse 93   Temp 98 °F (36.7 °C) (Oral)   Resp 16   Ht 5' 3" (1.6 m)   Wt 88.6 kg (195 lb 5.2 oz)   LMP 08/01/2021 (Approximate)   SpO2 98%   BMI 34.60 kg/m²      Assessment:       1. Annual physical exam    2. Mixed hyperlipidemia    3. Obesity (BMI 30-39.9)    4. Seasonal allergies    5. Lumbar back pain    6. Screening for diabetes mellitus    7. Healthcare maintenance    8. Encounter for screening mammogram for malignant neoplasm of breast        Plan:       1. Annual physical exam    2. Mixed hyperlipidemia   - Stable-Continue low fat, low cholesterol diet   - Continue current plan of care    3. Obesity (BMI 30-39.9)   - Low fat, low cholesterol diet and exercise as tolerated    4. Seasonal allergies  -     levocetirizine (XYZAL) 5 MG tablet; Take 1 tablet (5 mg total) by mouth every evening.  Dispense: 30 tablet; Refill: 11    5. Lumbar back pain  -     meloxicam (MOBIC) 7.5 MG tablet; Take 1 tablet (7.5 mg total) by mouth once daily.  Dispense: 30 tablet; Refill: 3    6. Screening for diabetes mellitus  -     Hemoglobin A1C; Future; Expected date: 02/15/2024    7. Healthcare maintenance  -     CBC W/ AUTO DIFFERENTIAL; Future; Expected date: 02/15/2024    8. Encounter for screening mammogram for malignant neoplasm of breast  -     Mammo Digital Screening Bilat w/ Cliff; Future; Expected date: 02/15/2024       Follow up in about 6 months (around 8/15/2024), or if symptoms worsen or fail to improve.      Future Appointments       Date Provider Specialty Appt Notes    8/15/2024 Gilma Schaeffer, [facility] 6 mo fu             "

## 2024-02-15 NOTE — PATIENT INSTRUCTIONS
Thank you for allowing me to be part of your healthcare team at [facility]. It is a pleasure to care for you today.   Please take all of your medications as instructed and follow all new instructions from your visit today.  If you received labs or medical tests today you should hear information about results or scheduling either by phone or mychart within approximately a week.   If you have any questions or concerns please do not hesitate to call. Have a blessed day and I hope to see you again soon.  FREDDY Greene,     If you are due for any health screening(s) below please notify me so we can arrange them to be ordered and scheduled. Most healthy patients at your age complete them, but you are free to accept or refuse.     If you can't do it, I'll definitely understand. If you can, I'd certainly appreciate it!    Tests to Keep You Healthy    Mammogram: ORDERED BUT NOT SCHEDULED  Colon Cancer Screening: Met on 9/21/2021      Schedule your breast cancer screening today     Breast cancer is the second most common cancer in women,  and the second leading cause of death from cancer. Mammograms can detect breast cancer early, which significantly increases the chances of curing the cancer.       Our records indicate that you may be overdue for breast cancer screening. Cancer screenings save lives, so schedule yours today to stay healthy.     If you recently had a mammogram performed outside of [facility], please let your Health care team know so that they can update your health record.

## 2024-02-22 ENCOUNTER — PATIENT MESSAGE (OUTPATIENT)
Dept: FAMILY MEDICINE | Facility: CLINIC | Age: 54
End: 2024-02-22
Payer: COMMERCIAL

## 2024-02-22 DIAGNOSIS — J30.2 SEASONAL ALLERGIES: ICD-10-CM

## 2024-02-22 DIAGNOSIS — M54.50 LUMBAR BACK PAIN: ICD-10-CM

## 2024-02-22 RX ORDER — MELOXICAM 7.5 MG/1
7.5 TABLET ORAL DAILY
Qty: 30 TABLET | Refills: 3 | Status: SHIPPED | OUTPATIENT
Start: 2024-02-22

## 2024-02-22 RX ORDER — LEVOCETIRIZINE DIHYDROCHLORIDE 5 MG/1
5 TABLET, FILM COATED ORAL NIGHTLY
Qty: 30 TABLET | Refills: 11 | Status: SHIPPED | OUTPATIENT
Start: 2024-02-22 | End: 2025-02-21

## 2024-08-15 ENCOUNTER — LAB VISIT (OUTPATIENT)
Dept: LAB | Facility: HOSPITAL | Age: 54
End: 2024-08-15
Payer: COMMERCIAL

## 2024-08-15 ENCOUNTER — OFFICE VISIT (OUTPATIENT)
Dept: FAMILY MEDICINE | Facility: CLINIC | Age: 54
End: 2024-08-15
Payer: COMMERCIAL

## 2024-08-15 VITALS
OXYGEN SATURATION: 99 % | RESPIRATION RATE: 16 BRPM | HEART RATE: 99 BPM | WEIGHT: 190.69 LBS | BODY MASS INDEX: 33.79 KG/M2 | SYSTOLIC BLOOD PRESSURE: 124 MMHG | HEIGHT: 63 IN | DIASTOLIC BLOOD PRESSURE: 76 MMHG

## 2024-08-15 DIAGNOSIS — R35.0 URINARY FREQUENCY: ICD-10-CM

## 2024-08-15 DIAGNOSIS — R73.03 PREDIABETES: ICD-10-CM

## 2024-08-15 DIAGNOSIS — R73.03 PREDIABETES: Primary | ICD-10-CM

## 2024-08-15 DIAGNOSIS — E78.2 MIXED HYPERLIPIDEMIA: ICD-10-CM

## 2024-08-15 LAB
ALBUMIN SERPL BCP-MCNC: 3.5 G/DL (ref 3.5–5.2)
ALP SERPL-CCNC: 71 U/L (ref 55–135)
ALT SERPL W/O P-5'-P-CCNC: 15 U/L (ref 10–44)
ANION GAP SERPL CALC-SCNC: 8 MMOL/L (ref 8–16)
AST SERPL-CCNC: 20 U/L (ref 10–40)
BILIRUB SERPL-MCNC: 0.3 MG/DL (ref 0.1–1)
BUN SERPL-MCNC: 11 MG/DL (ref 6–20)
CALCIUM SERPL-MCNC: 9.6 MG/DL (ref 8.7–10.5)
CHLORIDE SERPL-SCNC: 110 MMOL/L (ref 95–110)
CO2 SERPL-SCNC: 25 MMOL/L (ref 23–29)
CREAT SERPL-MCNC: 0.9 MG/DL (ref 0.5–1.4)
EST. GFR  (NO RACE VARIABLE): >60 ML/MIN/1.73 M^2
ESTIMATED AVG GLUCOSE: 126 MG/DL (ref 68–131)
GLUCOSE SERPL-MCNC: 97 MG/DL (ref 70–110)
HBA1C MFR BLD: 6 % (ref 4–5.6)
POTASSIUM SERPL-SCNC: 4.4 MMOL/L (ref 3.5–5.1)
PROT SERPL-MCNC: 7.8 G/DL (ref 6–8.4)
SODIUM SERPL-SCNC: 143 MMOL/L (ref 136–145)

## 2024-08-15 PROCEDURE — 3008F BODY MASS INDEX DOCD: CPT | Mod: CPTII,S$GLB,,

## 2024-08-15 PROCEDURE — 80053 COMPREHEN METABOLIC PANEL: CPT

## 2024-08-15 PROCEDURE — 3044F HG A1C LEVEL LT 7.0%: CPT | Mod: CPTII,S$GLB,,

## 2024-08-15 PROCEDURE — 3074F SYST BP LT 130 MM HG: CPT | Mod: CPTII,S$GLB,,

## 2024-08-15 PROCEDURE — 83036 HEMOGLOBIN GLYCOSYLATED A1C: CPT

## 2024-08-15 PROCEDURE — 36415 COLL VENOUS BLD VENIPUNCTURE: CPT | Mod: PO

## 2024-08-15 PROCEDURE — 99214 OFFICE O/P EST MOD 30 MIN: CPT | Mod: S$GLB,,,

## 2024-08-15 PROCEDURE — 3078F DIAST BP <80 MM HG: CPT | Mod: CPTII,S$GLB,,

## 2024-08-15 PROCEDURE — 99999 PR PBB SHADOW E&M-EST. PATIENT-LVL IV: CPT | Mod: PBBFAC,,,

## 2024-08-15 PROCEDURE — 1159F MED LIST DOCD IN RCRD: CPT | Mod: CPTII,S$GLB,,

## 2024-08-15 PROCEDURE — 1160F RVW MEDS BY RX/DR IN RCRD: CPT | Mod: CPTII,S$GLB,,

## 2024-08-15 NOTE — PATIENT INSTRUCTIONS
Thank you for allowing me to be part of your healthcare team at [facility]. It is a pleasure to care for you today.   Please take all of your medications as instructed and follow all new instructions from your visit today.  If you received labs or medical tests today you should hear information about results or scheduling either by phone or mychart within approximately a week.   If you have any questions or concerns please do not hesitate to call. Have a blessed day and I hope to see you again soon.  FREDDY Greene      WE STRIVE FOR 5'S!!!        We strive for exceptional care. Please fill out a survey if you received 5 star service.

## 2024-08-15 NOTE — PROGRESS NOTES
Subjective:       Patient ID: Joselin Phillips is a 53 y.o. female.    Chief Complaint: Follow-up    Patient presents to the clinic for a 6 month follow up.     Patient Active Problem List:     Plantar fasciitis     Left foot pain     Equinus contracture of ankle     Obesity (BMI 30-39.9)     Plantar fasciitis, left     Screening for colon cancer     Fibroid     Mixed hyperlipidemia     Decreased range of motion of left ankle     Impaired functional mobility, balance, gait, and endurance    Mammogram- order already in- she is going to schedule  Colonoscopy- 2021- Repeat in 5 years- Due 2026    States back pain is doing well.      She reports urinary frequency.     Has no new complaints or concerns today.     Patient educated on plan of care, verbalized understanding.         Review of Systems   Constitutional:  Negative for activity change, appetite change, chills, diaphoresis and fever.   HENT:  Negative for congestion, ear pain, postnasal drip, sinus pressure, sneezing and sore throat.    Eyes:  Negative for pain, discharge, redness and itching.   Respiratory:  Negative for apnea, cough, chest tightness, shortness of breath and wheezing.    Cardiovascular:  Negative for chest pain and leg swelling.   Gastrointestinal:  Negative for abdominal distention, abdominal pain, constipation, diarrhea, nausea and vomiting.   Genitourinary:  Negative for difficulty urinating, dysuria, flank pain and frequency.   Skin:  Negative for color change, rash and wound.   Neurological:  Negative for dizziness.   All other systems reviewed and are negative.      Patient Active Problem List   Diagnosis    Plantar fasciitis    Left foot pain    Equinus contracture of ankle    Obesity (BMI 30-39.9)    Plantar fasciitis, left    Screening for colon cancer    Fibroid    Mixed hyperlipidemia    Decreased range of motion of left ankle    Impaired functional mobility, balance, gait, and endurance       Objective:      Physical  "Exam  Vitals and nursing note reviewed.   Constitutional:       General: She is not in acute distress.     Appearance: Normal appearance. She is well-developed.   HENT:      Head: Normocephalic.      Nose: Nose normal.   Eyes:      Conjunctiva/sclera: Conjunctivae normal.      Pupils: Pupils are equal, round, and reactive to light.   Cardiovascular:      Rate and Rhythm: Normal rate and regular rhythm.      Heart sounds: Normal heart sounds.   Pulmonary:      Effort: Pulmonary effort is normal. No respiratory distress.      Breath sounds: Normal breath sounds.   Musculoskeletal:      Cervical back: Normal range of motion and neck supple.   Skin:     General: Skin is warm and dry.      Findings: No rash.   Neurological:      Mental Status: She is alert and oriented to person, place, and time.   Psychiatric:         Behavior: Behavior normal.         Lab Results   Component Value Date    WBC 6.90 02/15/2024    HGB 12.2 02/15/2024    HCT 38.2 02/15/2024     02/15/2024    CHOL 204 (H) 02/15/2024    TRIG 60 02/15/2024    HDL 60 02/15/2024    ALT 14 02/15/2024    AST 23 02/15/2024     02/15/2024    K 3.7 02/15/2024     02/15/2024    CREATININE 0.7 02/15/2024    BUN 12 02/15/2024    CO2 26 02/15/2024    TSH 0.624 02/15/2024    HGBA1C 6.2 (H) 02/15/2024     The 10-year ASCVD risk score (Abdi DAVID, et al., 2019) is: 2.7%    Values used to calculate the score:      Age: 53 years      Sex: Female      Is Non- : Yes      Diabetic: No      Tobacco smoker: No      Systolic Blood Pressure: 124 mmHg      Is BP treated: No      HDL Cholesterol: 60 mg/dL      Total Cholesterol: 204 mg/dL  Visit Vitals  /76 (BP Location: Right arm, Patient Position: Sitting, BP Method: Small (Manual))   Pulse 99   Resp 16   Ht 5' 3" (1.6 m)   Wt 86.5 kg (190 lb 11.2 oz)   LMP 08/01/2021 (Approximate)   SpO2 99%   BMI 33.78 kg/m²      Assessment:       1. Prediabetes    2. Urinary frequency    3. Mixed " hyperlipidemia        Plan:       1. Prediabetes  -     Hemoglobin A1C; Future; Expected date: 08/15/2024  -     COMPREHENSIVE METABOLIC PANEL; Future; Expected date: 08/15/2024   - Stable-Continue low glycemic diet   - Continue current plan of care    2. Urinary frequency  -     Urinalysis; Future; Expected date: 08/15/2024  -     Urine culture    3. Mixed hyperlipidemia   - Stable-Continue low cholesterol diet   - Continue current plan of care       Follow up in about 6 months (around 2/15/2025), or if symptoms worsen or fail to improve.      Future Appointments       Date Provider Specialty Appt Notes    2/17/2025 Gilma Schaeffer, [facility] 6 mo f/u

## 2024-08-20 DIAGNOSIS — K21.9 GASTROESOPHAGEAL REFLUX DISEASE WITHOUT ESOPHAGITIS: Primary | ICD-10-CM

## 2024-08-20 RX ORDER — OMEPRAZOLE 40 MG/1
40 CAPSULE, DELAYED RELEASE ORAL DAILY
Qty: 90 CAPSULE | Refills: 1 | Status: SHIPPED | OUTPATIENT
Start: 2024-08-20

## 2024-09-18 ENCOUNTER — HOSPITAL ENCOUNTER (OUTPATIENT)
Dept: RADIOLOGY | Facility: HOSPITAL | Age: 54
Discharge: HOME | End: 2024-09-18
Attending: STUDENT IN AN ORGANIZED HEALTH CARE EDUCATION/TRAINING PROGRAM
Payer: COMMERCIAL

## 2024-09-18 DIAGNOSIS — Z12.31 OTHER SCREENING MAMMOGRAM: ICD-10-CM

## 2024-09-18 PROCEDURE — 77067 SCR MAMMO BI INCL CAD: CPT | Mod: 26,,, | Performed by: RADIOLOGY

## 2024-09-18 PROCEDURE — 77067 SCR MAMMO BI INCL CAD: CPT | Mod: TC

## 2024-09-18 PROCEDURE — 77063 BREAST TOMOSYNTHESIS BI: CPT | Mod: 26,,, | Performed by: RADIOLOGY

## 2024-11-07 ENCOUNTER — OCCUPATIONAL HEALTH (OUTPATIENT)
Dept: URGENT CARE | Facility: CLINIC | Age: 54
End: 2024-11-07
Payer: COMMERCIAL

## 2024-11-07 DIAGNOSIS — Z23 ENCOUNTER FOR IMMUNIZATION: ICD-10-CM

## 2024-11-07 DIAGNOSIS — Z23 NEED FOR IMMUNIZATION AGAINST INFLUENZA: Primary | ICD-10-CM

## 2025-03-05 ENCOUNTER — OCCUPATIONAL HEALTH (OUTPATIENT)
Dept: URGENT CARE | Facility: CLINIC | Age: 55
End: 2025-03-05

## 2025-03-05 DIAGNOSIS — Z00.00 ROUTINE GENERAL MEDICAL EXAMINATION AT A HEALTH CARE FACILITY: Primary | ICD-10-CM

## 2025-03-07 DIAGNOSIS — M54.50 LUMBAR BACK PAIN: ICD-10-CM

## 2025-03-07 DIAGNOSIS — J30.2 SEASONAL ALLERGIES: ICD-10-CM

## 2025-03-07 RX ORDER — MELOXICAM 7.5 MG/1
7.5 TABLET ORAL DAILY
Qty: 30 TABLET | Refills: 3 | Status: SHIPPED | OUTPATIENT
Start: 2025-03-07

## 2025-03-07 RX ORDER — LEVOCETIRIZINE DIHYDROCHLORIDE 5 MG/1
5 TABLET, FILM COATED ORAL NIGHTLY
Qty: 30 TABLET | Refills: 11 | Status: SHIPPED | OUTPATIENT
Start: 2025-03-07 | End: 2026-03-07

## 2025-03-12 ENCOUNTER — E-VISIT (OUTPATIENT)
Dept: FAMILY MEDICINE | Facility: CLINIC | Age: 55
End: 2025-03-12
Payer: COMMERCIAL

## 2025-03-12 DIAGNOSIS — R35.0 URINARY FREQUENCY: Primary | ICD-10-CM

## 2025-03-12 DIAGNOSIS — N30.00 ACUTE CYSTITIS WITHOUT HEMATURIA: ICD-10-CM

## 2025-03-12 RX ORDER — NITROFURANTOIN 25; 75 MG/1; MG/1
100 CAPSULE ORAL 2 TIMES DAILY
Qty: 10 CAPSULE | Refills: 0 | Status: SHIPPED | OUTPATIENT
Start: 2025-03-12 | End: 2025-03-17

## 2025-03-12 NOTE — PROGRESS NOTES
Patient ID: Joselin Phillips is a 54 y.o. female.    Chief Complaint: General Illness (Entered automatically based on patient selection in [employer].)    The patient initiated a request through [employer] on 3/12/2025 for evaluation and management with a chief complaint of General Illness (Entered automatically based on patient selection in [employer].)     I evaluated the questionnaire submission on 3/12/25.    [facility]-Common Problems    3/12/2025  3:33 PM CDT - Filed by Patient   What do you need help with? Urinary Symptoms   Do you agree to participate in an E-Visit? Yes   If you have any of the following symptoms, please go to the nearest emergency room or call 911: I acknowledge   What is the main issue you would like addressed today? Maybe UTI infection   Do you have any of the following symptoms? Passing urine more frequently;  Cloudy urine   When did your skin concern begin? 3/1/2025   What medications or treatments have you used to help your symptoms? Cranberry products   What does your urine look like? Dark yellow   Have you had any of the following symptoms during the past 24 hours?   Urgency (a sudden and uncontrollable urge to urinate) Moderate   Frequency (going to the toilet very often) Severe   Burning pain when urinating None   Incomplete bladder emptying (still feel like you need to urinate again after urination) (None, Mild, Moderate, Severe) Mild   Pain in the lower belly when youre not urinating. None   Discomfort from your urinary symptoms. Mild   Have you had any of the following symptoms during the past 24 hours?   Blood seen in the urine None   Pain in the lower back on one or both sides (flank pain) Moderate   Abnormal Vaginal Discharge (abnormal amount, color and/or odor) Mild   Discharge from the opening you urinate from (urethra) when not urinating. None   Have your symptoms interfered with your every day activities? Mild   Do you have a fever? No   Are you a diabetic?  No   Are you currently experiencing any of the following while completing this questionnaire? None   Do you have a history of kidney stones? No   Provide any additional information you feel is important. Just going more often then usually and has an odor to it as well   Please attach any relevant images or files (if you have performed a home test for UTI, please submit a photo of results)    Are you able to take your vital signs? No         Encounter Diagnoses   Name Primary?    Urinary frequency Yes    Acute cystitis without hematuria         No orders of the defined types were placed in this encounter.     Medications Ordered This Encounter   Medications    nitrofurantoin, macrocrystal-monohydrate, (MACROBID) 100 MG capsule     Sig: Take 1 capsule (100 mg total) by mouth 2 (two) times daily. for 5 days     Dispense:  10 capsule     Refill:  0        Follow up if symptoms worsen or fail to improve.      E-Visit Time Tracking:    Day 1 Time (in minutes): 5    Total Time (in minutes): 5

## 2025-06-02 RX ORDER — ESTRADIOL 0.1 MG/D
1 FILM, EXTENDED RELEASE TRANSDERMAL
Qty: 8 PATCH | Refills: 4 | OUTPATIENT
Start: 2025-06-02 | End: 2026-06-02

## 2025-07-22 DIAGNOSIS — K21.9 GASTROESOPHAGEAL REFLUX DISEASE WITHOUT ESOPHAGITIS: ICD-10-CM

## 2025-07-23 RX ORDER — OMEPRAZOLE 40 MG/1
40 CAPSULE, DELAYED RELEASE ORAL DAILY
Qty: 90 CAPSULE | Refills: 1 | Status: SHIPPED | OUTPATIENT
Start: 2025-07-23

## 2025-07-31 ENCOUNTER — LAB VISIT (OUTPATIENT)
Dept: LAB | Facility: HOSPITAL | Age: 55
End: 2025-07-31
Payer: COMMERCIAL

## 2025-07-31 ENCOUNTER — OFFICE VISIT (OUTPATIENT)
Dept: FAMILY MEDICINE | Facility: CLINIC | Age: 55
End: 2025-07-31
Payer: COMMERCIAL

## 2025-07-31 VITALS
WEIGHT: 191.56 LBS | HEIGHT: 63 IN | TEMPERATURE: 98 F | DIASTOLIC BLOOD PRESSURE: 68 MMHG | OXYGEN SATURATION: 98 % | HEART RATE: 81 BPM | SYSTOLIC BLOOD PRESSURE: 118 MMHG | BODY MASS INDEX: 33.94 KG/M2

## 2025-07-31 DIAGNOSIS — Z13.220 ENCOUNTER FOR LIPID SCREENING FOR CARDIOVASCULAR DISEASE: ICD-10-CM

## 2025-07-31 DIAGNOSIS — R42 DIZZINESS: ICD-10-CM

## 2025-07-31 DIAGNOSIS — R73.03 PREDIABETES: ICD-10-CM

## 2025-07-31 DIAGNOSIS — Z13.6 ENCOUNTER FOR LIPID SCREENING FOR CARDIOVASCULAR DISEASE: ICD-10-CM

## 2025-07-31 DIAGNOSIS — M54.50 ACUTE LEFT-SIDED LOW BACK PAIN WITHOUT SCIATICA: Primary | ICD-10-CM

## 2025-07-31 LAB
ABSOLUTE EOSINOPHIL (OHS): 0.29 K/UL
ABSOLUTE MONOCYTE (OHS): 0.85 K/UL (ref 0.3–1)
ABSOLUTE NEUTROPHIL COUNT (OHS): 2.72 K/UL (ref 1.8–7.7)
ALBUMIN SERPL BCP-MCNC: 3.6 G/DL (ref 3.5–5.2)
ALP SERPL-CCNC: 68 UNIT/L (ref 40–150)
ALT SERPL W/O P-5'-P-CCNC: 15 UNIT/L (ref 0–55)
ANION GAP (OHS): 6 MMOL/L (ref 8–16)
AST SERPL-CCNC: 32 UNIT/L (ref 0–50)
BASOPHILS # BLD AUTO: 0.08 K/UL
BASOPHILS NFR BLD AUTO: 1.1 %
BILIRUB SERPL-MCNC: 0.8 MG/DL (ref 0.1–1)
BUN SERPL-MCNC: 10 MG/DL (ref 6–20)
CALCIUM SERPL-MCNC: 9.4 MG/DL (ref 8.7–10.5)
CHLORIDE SERPL-SCNC: 106 MMOL/L (ref 95–110)
CHOLEST SERPL-MCNC: 222 MG/DL (ref 120–199)
CHOLEST/HDLC SERPL: 3.6 {RATIO} (ref 2–5)
CO2 SERPL-SCNC: 27 MMOL/L (ref 23–29)
CREAT SERPL-MCNC: 0.6 MG/DL (ref 0.5–1.4)
EAG (OHS): 117 MG/DL (ref 68–131)
ERYTHROCYTE [DISTWIDTH] IN BLOOD BY AUTOMATED COUNT: 14.1 % (ref 11.5–14.5)
GFR SERPLBLD CREATININE-BSD FMLA CKD-EPI: >60 ML/MIN/1.73/M2
GLUCOSE SERPL-MCNC: 76 MG/DL (ref 70–110)
HBA1C MFR BLD: 5.7 % (ref 4–5.6)
HCT VFR BLD AUTO: 37.8 % (ref 37–48.5)
HDLC SERPL-MCNC: 62 MG/DL (ref 40–75)
HDLC SERPL: 27.9 % (ref 20–50)
HGB BLD-MCNC: 12 GM/DL (ref 12–16)
IMM GRANULOCYTES # BLD AUTO: 0.02 K/UL (ref 0–0.04)
IMM GRANULOCYTES NFR BLD AUTO: 0.3 % (ref 0–0.5)
LDLC SERPL CALC-MCNC: 142.4 MG/DL (ref 63–159)
LYMPHOCYTES # BLD AUTO: 3.46 K/UL (ref 1–4.8)
MCH RBC QN AUTO: 27.9 PG (ref 27–31)
MCHC RBC AUTO-ENTMCNC: 31.7 G/DL (ref 32–36)
MCV RBC AUTO: 88 FL (ref 82–98)
NONHDLC SERPL-MCNC: 160 MG/DL
NUCLEATED RBC (/100WBC) (OHS): 0 /100 WBC
PLATELET # BLD AUTO: 325 K/UL (ref 150–450)
PMV BLD AUTO: 10.4 FL (ref 9.2–12.9)
POTASSIUM SERPL-SCNC: 4.3 MMOL/L (ref 3.5–5.1)
PROT SERPL-MCNC: 7.7 GM/DL (ref 6–8.4)
RBC # BLD AUTO: 4.3 M/UL (ref 4–5.4)
RELATIVE EOSINOPHIL (OHS): 3.9 %
RELATIVE LYMPHOCYTE (OHS): 46.6 % (ref 18–48)
RELATIVE MONOCYTE (OHS): 11.5 % (ref 4–15)
RELATIVE NEUTROPHIL (OHS): 36.6 % (ref 38–73)
SODIUM SERPL-SCNC: 139 MMOL/L (ref 136–145)
TRIGL SERPL-MCNC: 88 MG/DL (ref 30–150)
TSH SERPL-ACNC: 0.89 UIU/ML (ref 0.4–4)
WBC # BLD AUTO: 7.42 K/UL (ref 3.9–12.7)

## 2025-07-31 PROCEDURE — 84443 ASSAY THYROID STIM HORMONE: CPT

## 2025-07-31 PROCEDURE — 80053 COMPREHEN METABOLIC PANEL: CPT

## 2025-07-31 PROCEDURE — 85025 COMPLETE CBC W/AUTO DIFF WBC: CPT

## 2025-07-31 PROCEDURE — 36415 COLL VENOUS BLD VENIPUNCTURE: CPT | Mod: PO

## 2025-07-31 PROCEDURE — 83036 HEMOGLOBIN GLYCOSYLATED A1C: CPT

## 2025-07-31 PROCEDURE — 80061 LIPID PANEL: CPT

## 2025-07-31 PROCEDURE — 99999 PR PBB SHADOW E&M-EST. PATIENT-LVL IV: CPT | Mod: PBBFAC,,,

## 2025-07-31 RX ORDER — METHYLPREDNISOLONE 4 MG/1
TABLET ORAL
Qty: 21 EACH | Refills: 0 | Status: SHIPPED | OUTPATIENT
Start: 2025-07-31 | End: 2025-08-21

## 2025-07-31 RX ORDER — IBUPROFEN 800 MG/1
800 TABLET, FILM COATED ORAL 3 TIMES DAILY PRN
Qty: 45 TABLET | Refills: 0 | Status: SHIPPED | OUTPATIENT
Start: 2025-07-31

## 2025-07-31 RX ORDER — KETOROLAC TROMETHAMINE 30 MG/ML
30 INJECTION, SOLUTION INTRAMUSCULAR; INTRAVENOUS
Status: COMPLETED | OUTPATIENT
Start: 2025-07-31 | End: 2025-07-31

## 2025-07-31 RX ORDER — CYCLOBENZAPRINE HCL 10 MG
10 TABLET ORAL NIGHTLY PRN
Qty: 30 TABLET | Refills: 0 | Status: SHIPPED | OUTPATIENT
Start: 2025-07-31

## 2025-07-31 RX ADMIN — KETOROLAC TROMETHAMINE 30 MG: 30 INJECTION, SOLUTION INTRAMUSCULAR; INTRAVENOUS at 11:07

## 2025-07-31 NOTE — PROGRESS NOTES
2 patient identifiers used (name and ). Administered ketorolac injection 30 mg Im ( Left dorsal gluteal) . Patient tolerated well, no bleeding at insertion site noted. Pain 0 on scale 0/10. Aseptic technique maintained. Immunization information given to patient. Advised patient to remain in clinic for 15 minutes to monitor for reaction. No AR noted.

## 2025-07-31 NOTE — PROGRESS NOTES
Patient ID: Joselin Phillips is a 54 y.o. female.        History of Present Illness    CHIEF COMPLAINT:  Patient presents today for left lower back pain.    LOWER BACK PAIN:  She reports constant left lower back pain between hip and back, extending to left buttock area for several months. Pain is severe, particularly during position changes at night, causing near-tearfulness when attempting to turn in bed. Pain is exacerbated by prolonged standing and sitting, requiring assistance to change positions. She denies leg radiation or specific injury. Pain is tender to touch and consistently uncomfortable throughout the day, though not significantly worsened with bending. She has been taking ibuprofen without significant relief.    DIZZINESS:  She reports new onset dizziness over the past two days, describing episodes of lightheadedness with position changes, specifically when standing up. Episodes last a few seconds and have occurred twice, requiring her to hold onto the wall to prevent falling. She denies dizziness at time of visit.    HYDRATION:  She acknowledges inadequate fluid intake, primarily consuming cold drinks instead of water.         Review of patient's allergies indicates:  No Known Allergies        Lab Results   Component Value Date    WBC 6.90 02/15/2024    HGB 12.2 02/15/2024    HCT 38.2 02/15/2024     02/15/2024    CHOL 204 (H) 02/15/2024    TRIG 60 02/15/2024    HDL 60 02/15/2024    ALT 15 08/15/2024    AST 20 08/15/2024     08/15/2024    K 4.4 08/15/2024     08/15/2024    CREATININE 0.9 08/15/2024    BUN 11 08/15/2024    CO2 25 08/15/2024    TSH 0.624 02/15/2024    HGBA1C 6.0 (H) 08/15/2024       Review of Systems   Constitutional:  Negative for activity change, appetite change, chills, diaphoresis and fever.   HENT:  Negative for congestion, ear pain, postnasal drip, sinus pressure, sneezing and sore throat.    Eyes:  Negative for pain, discharge, redness and itching.    Respiratory:  Negative for apnea, cough, chest tightness, shortness of breath and wheezing.    Cardiovascular:  Negative for chest pain and leg swelling.   Gastrointestinal:  Negative for abdominal distention, abdominal pain, constipation, diarrhea, nausea and vomiting.   Genitourinary:  Negative for difficulty urinating, dysuria, flank pain and frequency.   Musculoskeletal:  Positive for arthralgias, back pain and myalgias.   Skin:  Negative for color change, rash and wound.   Neurological:  Positive for dizziness.   All other systems reviewed and are negative.      Objective:   Physical Exam  Vitals and nursing note reviewed.   Constitutional:       General: She is not in acute distress.     Appearance: Normal appearance. She is well-developed.   HENT:      Head: Normocephalic.      Nose: Nose normal.   Eyes:      Conjunctiva/sclera: Conjunctivae normal.      Pupils: Pupils are equal, round, and reactive to light.   Cardiovascular:      Rate and Rhythm: Normal rate and regular rhythm.      Heart sounds: Normal heart sounds.   Pulmonary:      Effort: Pulmonary effort is normal. No respiratory distress.      Breath sounds: Normal breath sounds.   Musculoskeletal:      Cervical back: Normal range of motion and neck supple.      Lumbar back: Tenderness present.        Back:    Skin:     General: Skin is warm and dry.      Findings: No rash.   Neurological:      Mental Status: She is alert and oriented to person, place, and time.   Psychiatric:         Behavior: Behavior normal.                Assessment/Plan     1. Acute left-sided low back pain without sciatica    2. Dizziness    3. Encounter for lipid screening for cardiovascular disease    4. Prediabetes       Assessment & Plan    - Assessed left lower back pain, present for a few months and worsened recently.  - Considered possible causes of dizziness, including dehydration, anemia, thyroid issues, and BP.  - Administered Toradol injection (anti-inflammatory)  "for immediate pain relief.  - Initiated conservative treatment for back pain with anti-inflammatory medication, muscle relaxants, and a steroid pack.  - Will consider further investigation with XR if pain does not improve after initial treatment.  - Explained that dizziness could be related to dehydration.         Joselin Parker" was seen today for hip pain.    Diagnoses and all orders for this visit:    Acute left-sided low back pain without sciatica  -     cyclobenzaprine (FLEXERIL) 10 MG tablet; Take 1 tablet (10 mg total) by mouth nightly as needed for Muscle spasms.  -     methylPREDNISolone (MEDROL DOSEPACK) 4 mg tablet; use as directed on package instructions  -     ibuprofen (ADVIL,MOTRIN) 800 MG tablet; Take 1 tablet (800 mg total) by mouth 3 (three) times daily as needed for Pain.  -     ketorolac injection 30 mg    Dizziness  -     CBC Auto Differential; Future  -     Comprehensive Metabolic Panel; Future  -     TSH; Future    Encounter for lipid screening for cardiovascular disease  -     Lipid Panel; Future    Prediabetes  -     Hemoglobin A1C; Future                   This note was generated with the assistance of ambient listening technology. Verbal consent was obtained by the patient and accompanying visitor(s) for the recording of patient appointment to facilitate this note. I attest to having reviewed and edited the generated note for accuracy, though some syntax or spelling errors may persist. Please contact the author of this note for any clarification.      "

## (undated) DEVICE — STERISTRIP 1/2 R1547

## (undated) DEVICE — BLADE SURG #15 CARBON STEEL

## (undated) DEVICE — GLOVE SURG ULTRA TOUCH 7.5

## (undated) DEVICE — SUTURE VICRYL 2-0 RB-1 27 J306H

## (undated) DEVICE — SEE MEDLINE ITEM 146270

## (undated) DEVICE — SYR EAR ULCER SGL USE 3 OZ

## (undated) DEVICE — UNDERGLOVES BIOGEL PI SIZE 7.5

## (undated) DEVICE — TRAY SKIN PREP DRY

## (undated) DEVICE — SYS LABEL CORRECT MED

## (undated) DEVICE — SEE MEDLINE ITEM 157131

## (undated) DEVICE — GAUZE VASELINE XEROFORM 5X9 8884433605

## (undated) DEVICE — BANDAGE ESMARK LATEX FREE 4INX

## (undated) DEVICE — SEE MEDLINE ITEM 146271

## (undated) DEVICE — CONTAINER SPECIMEN STRL 4OZ

## (undated) DEVICE — BLADE OSCILLATING KM3-111

## (undated) DEVICE — SOLUTION IRRI NS BOTTLE 1000ML R5200-01

## (undated) DEVICE — GLOVE BIOGEL PI   GOLD SIZE 8

## (undated) DEVICE — BLADE SCALPEL #15 371115

## (undated) DEVICE — WALKER SIDEKICK LOW PROFILE LARGE

## (undated) DEVICE — BANDAGE KERLIX   441106

## (undated) DEVICE — NDL SAFETY 25G X 1.5 ECLIPSE

## (undated) DEVICE — SPONGE DERMACEA 4X4IN 12PLY

## (undated) DEVICE — NDL BOX COUNTER

## (undated) DEVICE — SEE MEDLINE ITEM 146372

## (undated) DEVICE — SKINMARKER W/RULER DEVON

## (undated) DEVICE — BANDAGE SOFTBAND 4 441506

## (undated) DEVICE — DRESSING ADAPTIC 3X8 2015

## (undated) DEVICE — PAD CAST SPECIALIST STRL 4

## (undated) DEVICE — SOLUTION SCRUB IODINE 4OZ

## (undated) DEVICE — ADHESIVE MASTISOL VIAL 0523-48

## (undated) DEVICE — BANDAGE ACE STERILE 4 REB3114

## (undated) DEVICE — SEE MEDLINE ITEM 157116

## (undated) DEVICE — TOURNIQUET SB QC DP 18X4IN

## (undated) DEVICE — PAD BOVIE ADULT

## (undated) DEVICE — CUFF TOURNIQUET 18DUAL PRT 5921-218-235

## (undated) DEVICE — Device

## (undated) DEVICE — TRAY LOWER EXTREMITY  SMHS029-05

## (undated) DEVICE — SPONGE GAUZE 10S 4X4  442214

## (undated) DEVICE — SUTURE FIBERWIRE #2 AR-7200

## (undated) DEVICE — DRESSING N ADH OIL EMUL 3X3

## (undated) DEVICE — COVER SURG LIGHT HANDLE

## (undated) DEVICE — SLEEVE SCD EXPRESS CALF MEDIUM

## (undated) DEVICE — PADDING CAST 6 INCH

## (undated) DEVICE — BOOT LOW LEG ROCKER VLCR MED

## (undated) DEVICE — BANDAGE ESMARK 4X9 55514

## (undated) DEVICE — GLOVE BIOGEL PI ULTRA TOUCH GRAY SZ7.5

## (undated) DEVICE — SYR 10CC LUER LOCK

## (undated) DEVICE — PACK ARTHROSCOPY W/ISO BAC

## (undated) DEVICE — SUTURE VICRYL 4-0 RB-1 27 J214H

## (undated) DEVICE — SET ENDOTRAC BLADE SYS DISP

## (undated) DEVICE — DRESSING GAUZE 6PLY 4X4

## (undated) DEVICE — SUTURE VICRYL 3-0 RB-1 27 J215H

## (undated) DEVICE — SEE MEDLINE ITEM 152487

## (undated) DEVICE — SPONGE GAUZE 16PLY 4X4

## (undated) DEVICE — SOLUTION PREP IODINE 4OZ

## (undated) DEVICE — SEE MEDLINE ITEM 152622

## (undated) DEVICE — NDL HYPODERMIC BLUNT 18G 1.5IN